# Patient Record
Sex: FEMALE | Race: WHITE | NOT HISPANIC OR LATINO | Employment: OTHER | URBAN - METROPOLITAN AREA
[De-identification: names, ages, dates, MRNs, and addresses within clinical notes are randomized per-mention and may not be internally consistent; named-entity substitution may affect disease eponyms.]

---

## 2017-03-02 ENCOUNTER — AMBULATORY - HEALTHEAST (OUTPATIENT)
Dept: CARDIAC REHAB | Facility: HOSPITAL | Age: 82
End: 2017-03-02

## 2017-03-02 DIAGNOSIS — I25.10 CAD (CORONARY ARTERY DISEASE): ICD-10-CM

## 2017-03-27 ENCOUNTER — OFFICE VISIT - HEALTHEAST (OUTPATIENT)
Dept: GERIATRICS | Facility: CLINIC | Age: 82
End: 2017-03-27

## 2017-03-27 DIAGNOSIS — I47.10 SUPRAVENTRICULAR TACHYCARDIA (H): ICD-10-CM

## 2017-03-27 DIAGNOSIS — N17.9 ACUTE KIDNEY INJURY (H): ICD-10-CM

## 2017-03-27 DIAGNOSIS — E87.1 HYPONATREMIA: ICD-10-CM

## 2017-03-27 DIAGNOSIS — I10 HYPERTENSION: ICD-10-CM

## 2017-03-27 DIAGNOSIS — R60.0 LOWER EXTREMITY EDEMA: ICD-10-CM

## 2017-03-27 DIAGNOSIS — R52 PAIN MANAGEMENT: ICD-10-CM

## 2017-03-27 DIAGNOSIS — S32.000A LUMBAR COMPRESSION FRACTURE (H): ICD-10-CM

## 2017-03-30 ENCOUNTER — COMMUNICATION - HEALTHEAST (OUTPATIENT)
Dept: CARDIOLOGY | Facility: CLINIC | Age: 82
End: 2017-03-30

## 2017-03-30 ENCOUNTER — OFFICE VISIT - HEALTHEAST (OUTPATIENT)
Dept: GERIATRICS | Facility: CLINIC | Age: 82
End: 2017-03-30

## 2017-03-30 DIAGNOSIS — R52 PAIN MANAGEMENT: ICD-10-CM

## 2017-03-30 DIAGNOSIS — N17.9 ACUTE KIDNEY INJURY (H): ICD-10-CM

## 2017-03-30 DIAGNOSIS — E87.1 HYPONATREMIA: ICD-10-CM

## 2017-03-30 DIAGNOSIS — R60.0 LOWER EXTREMITY EDEMA: ICD-10-CM

## 2017-03-30 DIAGNOSIS — S32.000A LUMBAR COMPRESSION FRACTURE (H): ICD-10-CM

## 2017-04-03 ENCOUNTER — OFFICE VISIT - HEALTHEAST (OUTPATIENT)
Dept: GERIATRICS | Facility: CLINIC | Age: 82
End: 2017-04-03

## 2017-04-03 DIAGNOSIS — E87.1 HYPONATREMIA: ICD-10-CM

## 2017-04-03 DIAGNOSIS — M54.50 LOW BACK PAIN: ICD-10-CM

## 2017-04-03 DIAGNOSIS — N17.9 ACUTE KIDNEY INJURY (H): ICD-10-CM

## 2017-04-03 DIAGNOSIS — R60.0 LOWER EXTREMITY EDEMA: ICD-10-CM

## 2017-04-03 DIAGNOSIS — S32.000A LUMBAR COMPRESSION FRACTURE (H): ICD-10-CM

## 2017-04-10 ENCOUNTER — OFFICE VISIT - HEALTHEAST (OUTPATIENT)
Dept: GERIATRICS | Facility: CLINIC | Age: 82
End: 2017-04-10

## 2017-04-10 DIAGNOSIS — S32.000A LUMBAR COMPRESSION FRACTURE (H): ICD-10-CM

## 2017-04-10 DIAGNOSIS — I10 HYPERTENSION: ICD-10-CM

## 2017-04-10 DIAGNOSIS — R60.0 LOWER EXTREMITY EDEMA: ICD-10-CM

## 2017-04-10 DIAGNOSIS — M54.50 LOW BACK PAIN: ICD-10-CM

## 2017-04-10 DIAGNOSIS — N17.9 ACUTE KIDNEY INJURY (H): ICD-10-CM

## 2017-04-13 ENCOUNTER — AMBULATORY - HEALTHEAST (OUTPATIENT)
Dept: GERIATRICS | Facility: CLINIC | Age: 82
End: 2017-04-13

## 2017-05-02 ENCOUNTER — AMBULATORY - HEALTHEAST (OUTPATIENT)
Dept: CARDIOLOGY | Facility: CLINIC | Age: 82
End: 2017-05-02

## 2017-05-05 ENCOUNTER — OFFICE VISIT - HEALTHEAST (OUTPATIENT)
Dept: CARDIOLOGY | Facility: CLINIC | Age: 82
End: 2017-05-05

## 2017-05-05 ENCOUNTER — AMBULATORY - HEALTHEAST (OUTPATIENT)
Dept: CARDIOLOGY | Facility: CLINIC | Age: 82
End: 2017-05-05

## 2017-05-05 DIAGNOSIS — I47.10 PAROXYSMAL SVT (SUPRAVENTRICULAR TACHYCARDIA) (H): ICD-10-CM

## 2017-05-05 DIAGNOSIS — E78.00 PURE HYPERCHOLESTEROLEMIA: ICD-10-CM

## 2017-05-05 DIAGNOSIS — I10 ESSENTIAL HYPERTENSION WITH GOAL BLOOD PRESSURE LESS THAN 140/90: ICD-10-CM

## 2017-05-05 DIAGNOSIS — E87.1 HYPONATREMIA: ICD-10-CM

## 2017-05-05 DIAGNOSIS — I25.10 CORONARY ARTERY DISEASE WITHOUT ANGINA PECTORIS: ICD-10-CM

## 2017-05-05 ASSESSMENT — MIFFLIN-ST. JEOR: SCORE: 954.73

## 2017-07-27 ENCOUNTER — RECORDS - HEALTHEAST (OUTPATIENT)
Dept: ADMINISTRATIVE | Facility: OTHER | Age: 82
End: 2017-07-27

## 2017-08-22 ENCOUNTER — RECORDS - HEALTHEAST (OUTPATIENT)
Dept: LAB | Facility: CLINIC | Age: 82
End: 2017-08-22

## 2017-08-22 LAB
CHOLEST SERPL-MCNC: 198 MG/DL
FASTING STATUS PATIENT QL REPORTED: NORMAL
HDLC SERPL-MCNC: 60 MG/DL
LDLC SERPL CALC-MCNC: 116 MG/DL
TRIGL SERPL-MCNC: 108 MG/DL

## 2018-02-22 ENCOUNTER — RECORDS - HEALTHEAST (OUTPATIENT)
Dept: LAB | Facility: CLINIC | Age: 83
End: 2018-02-22

## 2018-02-22 LAB
ALBUMIN SERPL-MCNC: 4.2 G/DL (ref 3.5–5)
ALP SERPL-CCNC: 106 U/L (ref 45–120)
ALT SERPL W P-5'-P-CCNC: 14 U/L (ref 0–45)
ANION GAP SERPL CALCULATED.3IONS-SCNC: 9 MMOL/L (ref 5–18)
AST SERPL W P-5'-P-CCNC: 18 U/L (ref 0–40)
BILIRUB SERPL-MCNC: 0.5 MG/DL (ref 0–1)
BUN SERPL-MCNC: 26 MG/DL (ref 8–28)
CALCIUM SERPL-MCNC: 10.1 MG/DL (ref 8.5–10.5)
CHLORIDE BLD-SCNC: 101 MMOL/L (ref 98–107)
CHOLEST SERPL-MCNC: 236 MG/DL
CO2 SERPL-SCNC: 26 MMOL/L (ref 22–31)
CREAT SERPL-MCNC: 1.28 MG/DL (ref 0.6–1.1)
FASTING STATUS PATIENT QL REPORTED: ABNORMAL
GFR SERPL CREATININE-BSD FRML MDRD: 39 ML/MIN/1.73M2
GLUCOSE BLD-MCNC: 103 MG/DL (ref 70–125)
HDLC SERPL-MCNC: 57 MG/DL
LDLC SERPL CALC-MCNC: 145 MG/DL
POTASSIUM BLD-SCNC: 4.3 MMOL/L (ref 3.5–5)
PROT SERPL-MCNC: 7.1 G/DL (ref 6–8)
SODIUM SERPL-SCNC: 136 MMOL/L (ref 136–145)
TRIGL SERPL-MCNC: 172 MG/DL

## 2018-04-12 ENCOUNTER — RECORDS - HEALTHEAST (OUTPATIENT)
Dept: LAB | Facility: CLINIC | Age: 83
End: 2018-04-12

## 2018-04-12 LAB
ALBUMIN UR-MCNC: NEGATIVE MG/DL
APPEARANCE UR: ABNORMAL
BACTERIA #/AREA URNS HPF: ABNORMAL HPF
BILIRUB UR QL STRIP: NEGATIVE
COLOR UR AUTO: ABNORMAL
GLUCOSE UR STRIP-MCNC: NEGATIVE MG/DL
HGB UR QL STRIP: NEGATIVE
HYALINE CASTS #/AREA URNS LPF: ABNORMAL LPF
KETONES UR STRIP-MCNC: NEGATIVE MG/DL
LEUKOCYTE ESTERASE UR QL STRIP: ABNORMAL
MUCOUS THREADS #/AREA URNS LPF: ABNORMAL LPF
NITRATE UR QL: NEGATIVE
PH UR STRIP: 5.5 [PH] (ref 4.5–8)
RBC #/AREA URNS AUTO: ABNORMAL HPF
SP GR UR STRIP: 1.01 (ref 1–1.03)
SQUAMOUS #/AREA URNS AUTO: ABNORMAL LPF
UROBILINOGEN UR STRIP-ACNC: ABNORMAL
WBC #/AREA URNS AUTO: ABNORMAL HPF
WBC CLUMPS #/AREA URNS HPF: PRESENT /[HPF]

## 2018-04-14 LAB — BACTERIA SPEC CULT: ABNORMAL

## 2018-05-16 ENCOUNTER — RECORDS - HEALTHEAST (OUTPATIENT)
Dept: LAB | Facility: CLINIC | Age: 83
End: 2018-05-16

## 2018-05-16 LAB
ALBUMIN UR-MCNC: NEGATIVE MG/DL
AMORPH CRY #/AREA URNS HPF: ABNORMAL /[HPF]
APPEARANCE UR: ABNORMAL
BACTERIA #/AREA URNS HPF: ABNORMAL HPF
BILIRUB UR QL STRIP: NEGATIVE
COLOR UR AUTO: YELLOW
GLUCOSE UR STRIP-MCNC: NEGATIVE MG/DL
HGB UR QL STRIP: NEGATIVE
KETONES UR STRIP-MCNC: NEGATIVE MG/DL
LEUKOCYTE ESTERASE UR QL STRIP: ABNORMAL
NITRATE UR QL: NEGATIVE
PH UR STRIP: 6.5 [PH] (ref 4.5–8)
RBC #/AREA URNS AUTO: ABNORMAL HPF
SP GR UR STRIP: 1.02 (ref 1–1.03)
SQUAMOUS #/AREA URNS AUTO: ABNORMAL LPF
UROBILINOGEN UR STRIP-ACNC: ABNORMAL
WBC #/AREA URNS AUTO: >100 HPF
WBC CLUMPS #/AREA URNS HPF: PRESENT /[HPF]

## 2018-05-18 LAB — BACTERIA SPEC CULT: ABNORMAL

## 2018-05-21 ENCOUNTER — RECORDS - HEALTHEAST (OUTPATIENT)
Dept: LAB | Facility: CLINIC | Age: 83
End: 2018-05-21

## 2018-05-21 LAB
ALBUMIN SERPL-MCNC: 3.8 G/DL (ref 3.5–5)
ALP SERPL-CCNC: 95 U/L (ref 45–120)
ALT SERPL W P-5'-P-CCNC: 11 U/L (ref 0–45)
ANION GAP SERPL CALCULATED.3IONS-SCNC: 8 MMOL/L (ref 5–18)
AST SERPL W P-5'-P-CCNC: 17 U/L (ref 0–40)
BASOPHILS # BLD AUTO: 0.1 THOU/UL (ref 0–0.2)
BASOPHILS NFR BLD AUTO: 1 % (ref 0–2)
BILIRUB SERPL-MCNC: 0.4 MG/DL (ref 0–1)
BUN SERPL-MCNC: 22 MG/DL (ref 8–28)
CALCIUM SERPL-MCNC: 10 MG/DL (ref 8.5–10.5)
CHLORIDE BLD-SCNC: 102 MMOL/L (ref 98–107)
CHOLEST SERPL-MCNC: 214 MG/DL
CO2 SERPL-SCNC: 26 MMOL/L (ref 22–31)
CREAT SERPL-MCNC: 1.28 MG/DL (ref 0.6–1.1)
EOSINOPHIL # BLD AUTO: 0.4 THOU/UL (ref 0–0.4)
EOSINOPHIL NFR BLD AUTO: 5 % (ref 0–6)
ERYTHROCYTE [DISTWIDTH] IN BLOOD BY AUTOMATED COUNT: 13 % (ref 11–14.5)
FASTING STATUS PATIENT QL REPORTED: ABNORMAL
GFR SERPL CREATININE-BSD FRML MDRD: 39 ML/MIN/1.73M2
GLUCOSE BLD-MCNC: 84 MG/DL (ref 70–125)
HCT VFR BLD AUTO: 36.1 % (ref 35–47)
HDLC SERPL-MCNC: 55 MG/DL
HGB BLD-MCNC: 11.7 G/DL (ref 12–16)
LDLC SERPL CALC-MCNC: 134 MG/DL
LYMPHOCYTES # BLD AUTO: 1.1 THOU/UL (ref 0.8–4.4)
LYMPHOCYTES NFR BLD AUTO: 15 % (ref 20–40)
MCH RBC QN AUTO: 30.3 PG (ref 27–34)
MCHC RBC AUTO-ENTMCNC: 32.4 G/DL (ref 32–36)
MCV RBC AUTO: 94 FL (ref 80–100)
MONOCYTES # BLD AUTO: 0.6 THOU/UL (ref 0–0.9)
MONOCYTES NFR BLD AUTO: 8 % (ref 2–10)
NEUTROPHILS # BLD AUTO: 5.2 THOU/UL (ref 2–7.7)
NEUTROPHILS NFR BLD AUTO: 71 % (ref 50–70)
PLATELET # BLD AUTO: 212 THOU/UL (ref 140–440)
PMV BLD AUTO: 10.6 FL (ref 8.5–12.5)
POTASSIUM BLD-SCNC: 4.6 MMOL/L (ref 3.5–5)
PROT SERPL-MCNC: 6.9 G/DL (ref 6–8)
RBC # BLD AUTO: 3.86 MILL/UL (ref 3.8–5.4)
SODIUM SERPL-SCNC: 136 MMOL/L (ref 136–145)
TRIGL SERPL-MCNC: 125 MG/DL
TSH SERPL DL<=0.005 MIU/L-ACNC: 0.49 UIU/ML (ref 0.3–5)
WBC: 7.4 THOU/UL (ref 4–11)

## 2018-05-22 LAB — 25(OH)D3 SERPL-MCNC: 54.7 NG/ML (ref 30–80)

## 2018-08-21 ENCOUNTER — RECORDS - HEALTHEAST (OUTPATIENT)
Dept: ADMINISTRATIVE | Facility: OTHER | Age: 83
End: 2018-08-21

## 2018-08-22 ENCOUNTER — HOSPITAL ENCOUNTER (OUTPATIENT)
Dept: ULTRASOUND IMAGING | Facility: HOSPITAL | Age: 83
Discharge: HOME | End: 2018-08-22

## 2018-08-22 DIAGNOSIS — R60.0 LEG EDEMA, LEFT: ICD-10-CM

## 2018-09-06 ENCOUNTER — RECORDS - HEALTHEAST (OUTPATIENT)
Dept: LAB | Facility: CLINIC | Age: 83
End: 2018-09-06

## 2018-09-06 LAB — TSH SERPL DL<=0.005 MIU/L-ACNC: 0.58 UIU/ML (ref 0.3–5)

## 2018-09-17 ENCOUNTER — RECORDS - HEALTHEAST (OUTPATIENT)
Dept: LAB | Facility: CLINIC | Age: 83
End: 2018-09-17

## 2018-09-17 LAB
ALBUMIN UR-MCNC: NEGATIVE MG/DL
APPEARANCE UR: ABNORMAL
BACTERIA #/AREA URNS HPF: ABNORMAL HPF
BILIRUB UR QL STRIP: NEGATIVE
COLOR UR AUTO: ABNORMAL
GLUCOSE UR STRIP-MCNC: NEGATIVE MG/DL
HGB UR QL STRIP: ABNORMAL
KETONES UR STRIP-MCNC: NEGATIVE MG/DL
LEUKOCYTE ESTERASE UR QL STRIP: ABNORMAL
NITRATE UR QL: NEGATIVE
PH UR STRIP: 5.5 [PH] (ref 4.5–8)
RBC #/AREA URNS AUTO: ABNORMAL HPF
SP GR UR STRIP: 1.02 (ref 1–1.03)
SQUAMOUS #/AREA URNS AUTO: ABNORMAL LPF
UROBILINOGEN UR STRIP-ACNC: ABNORMAL
WBC #/AREA URNS AUTO: >100 HPF
WBC CLUMPS #/AREA URNS HPF: PRESENT /[HPF]

## 2018-09-19 LAB — BACTERIA SPEC CULT: ABNORMAL

## 2018-10-03 ENCOUNTER — RECORDS - HEALTHEAST (OUTPATIENT)
Dept: LAB | Facility: CLINIC | Age: 83
End: 2018-10-03

## 2018-10-03 LAB
ALBUMIN UR-MCNC: NEGATIVE MG/DL
APPEARANCE UR: CLEAR
BACTERIA #/AREA URNS HPF: ABNORMAL HPF
BILIRUB UR QL STRIP: NEGATIVE
COLOR UR AUTO: YELLOW
GLUCOSE UR STRIP-MCNC: NEGATIVE MG/DL
HGB UR QL STRIP: NEGATIVE
KETONES UR STRIP-MCNC: NEGATIVE MG/DL
LEUKOCYTE ESTERASE UR QL STRIP: ABNORMAL
MUCOUS THREADS #/AREA URNS LPF: ABNORMAL LPF
NITRATE UR QL: NEGATIVE
PH UR STRIP: 5.5 [PH] (ref 4.5–8)
RBC #/AREA URNS AUTO: ABNORMAL HPF
SP GR UR STRIP: 1.01 (ref 1–1.03)
SQUAMOUS #/AREA URNS AUTO: ABNORMAL LPF
UROBILINOGEN UR STRIP-ACNC: ABNORMAL
WBC #/AREA URNS AUTO: ABNORMAL HPF

## 2018-10-04 LAB — BACTERIA SPEC CULT: NO GROWTH

## 2018-12-03 ENCOUNTER — RECORDS - HEALTHEAST (OUTPATIENT)
Dept: LAB | Facility: CLINIC | Age: 83
End: 2018-12-03

## 2018-12-03 LAB
25(OH)D3 SERPL-MCNC: 55.6 NG/ML (ref 30–80)
ANION GAP SERPL CALCULATED.3IONS-SCNC: 8 MMOL/L (ref 5–18)
BUN SERPL-MCNC: 19 MG/DL (ref 8–28)
CALCIUM SERPL-MCNC: 9.8 MG/DL (ref 8.5–10.5)
CHLORIDE BLD-SCNC: 102 MMOL/L (ref 98–107)
CHOLEST SERPL-MCNC: 131 MG/DL
CO2 SERPL-SCNC: 26 MMOL/L (ref 22–31)
CREAT SERPL-MCNC: 1.29 MG/DL (ref 0.6–1.1)
FASTING STATUS PATIENT QL REPORTED: NORMAL
GFR SERPL CREATININE-BSD FRML MDRD: 39 ML/MIN/1.73M2
GLUCOSE BLD-MCNC: 89 MG/DL (ref 70–125)
HDLC SERPL-MCNC: 56 MG/DL
LDLC SERPL CALC-MCNC: 55 MG/DL
POTASSIUM BLD-SCNC: 5 MMOL/L (ref 3.5–5)
SODIUM SERPL-SCNC: 136 MMOL/L (ref 136–145)
TRIGL SERPL-MCNC: 99 MG/DL
TSH SERPL DL<=0.005 MIU/L-ACNC: 0.93 UIU/ML (ref 0.3–5)

## 2019-05-09 ENCOUNTER — RECORDS - HEALTHEAST (OUTPATIENT)
Dept: LAB | Facility: CLINIC | Age: 84
End: 2019-05-09

## 2019-05-09 LAB
ALBUMIN SERPL-MCNC: 4.4 G/DL (ref 3.5–5)
ALP SERPL-CCNC: 94 U/L (ref 45–120)
ALT SERPL W P-5'-P-CCNC: 14 U/L (ref 0–45)
ANION GAP SERPL CALCULATED.3IONS-SCNC: 12 MMOL/L (ref 5–18)
AST SERPL W P-5'-P-CCNC: 19 U/L (ref 0–40)
BILIRUB SERPL-MCNC: 0.5 MG/DL (ref 0–1)
BUN SERPL-MCNC: 23 MG/DL (ref 8–28)
CALCIUM SERPL-MCNC: 10.3 MG/DL (ref 8.5–10.5)
CHLORIDE BLD-SCNC: 103 MMOL/L (ref 98–107)
CHOLEST SERPL-MCNC: 148 MG/DL
CO2 SERPL-SCNC: 24 MMOL/L (ref 22–31)
CREAT SERPL-MCNC: 1.3 MG/DL (ref 0.6–1.1)
FASTING STATUS PATIENT QL REPORTED: NORMAL
GFR SERPL CREATININE-BSD FRML MDRD: 38 ML/MIN/1.73M2
GLUCOSE BLD-MCNC: 94 MG/DL (ref 70–125)
HDLC SERPL-MCNC: 62 MG/DL
LDLC SERPL CALC-MCNC: 61 MG/DL
POTASSIUM BLD-SCNC: 4.6 MMOL/L (ref 3.5–5)
PROT SERPL-MCNC: 7.2 G/DL (ref 6–8)
SODIUM SERPL-SCNC: 139 MMOL/L (ref 136–145)
TRIGL SERPL-MCNC: 124 MG/DL
TSH SERPL DL<=0.005 MIU/L-ACNC: 0.44 UIU/ML (ref 0.3–5)

## 2019-05-10 LAB — 25(OH)D3 SERPL-MCNC: 55 NG/ML (ref 30–80)

## 2019-06-14 ENCOUNTER — RECORDS - HEALTHEAST (OUTPATIENT)
Dept: LAB | Facility: CLINIC | Age: 84
End: 2019-06-14

## 2019-06-14 LAB
ALBUMIN UR-MCNC: NEGATIVE MG/DL
APPEARANCE UR: ABNORMAL
BACTERIA #/AREA URNS HPF: ABNORMAL HPF
BILIRUB UR QL STRIP: NEGATIVE
COLOR UR AUTO: YELLOW
GLUCOSE UR STRIP-MCNC: NEGATIVE MG/DL
HGB UR QL STRIP: ABNORMAL
KETONES UR STRIP-MCNC: NEGATIVE MG/DL
LEUKOCYTE ESTERASE UR QL STRIP: ABNORMAL
MUCOUS THREADS #/AREA URNS LPF: ABNORMAL LPF
NITRATE UR QL: NEGATIVE
PH UR STRIP: 5.5 [PH] (ref 4.5–8)
RBC #/AREA URNS AUTO: ABNORMAL HPF
SP GR UR STRIP: 1.01 (ref 1–1.03)
SQUAMOUS #/AREA URNS AUTO: ABNORMAL LPF
UROBILINOGEN UR STRIP-ACNC: ABNORMAL
WBC #/AREA URNS AUTO: >100 HPF

## 2019-06-16 LAB — BACTERIA SPEC CULT: ABNORMAL

## 2019-07-22 ENCOUNTER — RECORDS - HEALTHEAST (OUTPATIENT)
Dept: LAB | Facility: CLINIC | Age: 84
End: 2019-07-22

## 2019-07-22 LAB
ALBUMIN UR-MCNC: NEGATIVE MG/DL
APPEARANCE UR: ABNORMAL
BILIRUB UR QL STRIP: NEGATIVE
COLOR UR AUTO: YELLOW
GLUCOSE UR STRIP-MCNC: NEGATIVE MG/DL
HGB UR QL STRIP: NEGATIVE
KETONES UR STRIP-MCNC: NEGATIVE MG/DL
LEUKOCYTE ESTERASE UR QL STRIP: ABNORMAL
NITRATE UR QL: NEGATIVE
PH UR STRIP: 5.5 [PH] (ref 4.5–8)
SP GR UR STRIP: 1.01 (ref 1–1.03)
UROBILINOGEN UR STRIP-ACNC: ABNORMAL

## 2019-07-23 ENCOUNTER — RECORDS - HEALTHEAST (OUTPATIENT)
Dept: LAB | Facility: CLINIC | Age: 84
End: 2019-07-23

## 2019-07-23 LAB
ANION GAP SERPL CALCULATED.3IONS-SCNC: 8 MMOL/L (ref 5–18)
BACTERIA SPEC CULT: NO GROWTH
BUN SERPL-MCNC: 22 MG/DL (ref 8–28)
CALCIUM SERPL-MCNC: 10 MG/DL (ref 8.5–10.5)
CHLORIDE BLD-SCNC: 101 MMOL/L (ref 98–107)
CO2 SERPL-SCNC: 25 MMOL/L (ref 22–31)
CREAT SERPL-MCNC: 1.23 MG/DL (ref 0.6–1.1)
ERYTHROCYTE [DISTWIDTH] IN BLOOD BY AUTOMATED COUNT: 13 % (ref 11–14.5)
GFR SERPL CREATININE-BSD FRML MDRD: 41 ML/MIN/1.73M2
GLUCOSE BLD-MCNC: 111 MG/DL (ref 70–125)
HCT VFR BLD AUTO: 36.2 % (ref 35–47)
HGB BLD-MCNC: 11.2 G/DL (ref 12–16)
MCH RBC QN AUTO: 29.8 PG (ref 27–34)
MCHC RBC AUTO-ENTMCNC: 30.9 G/DL (ref 32–36)
MCV RBC AUTO: 96 FL (ref 80–100)
PLATELET # BLD AUTO: 242 THOU/UL (ref 140–440)
PMV BLD AUTO: 11.2 FL (ref 8.5–12.5)
POTASSIUM BLD-SCNC: 3.6 MMOL/L (ref 3.5–5)
RBC # BLD AUTO: 3.76 MILL/UL (ref 3.8–5.4)
SODIUM SERPL-SCNC: 134 MMOL/L (ref 136–145)
TSH SERPL DL<=0.005 MIU/L-ACNC: 0.88 UIU/ML (ref 0.3–5)
WBC: 7.3 THOU/UL (ref 4–11)

## 2019-08-02 ENCOUNTER — RECORDS - HEALTHEAST (OUTPATIENT)
Dept: LAB | Facility: CLINIC | Age: 84
End: 2019-08-02

## 2019-08-02 LAB
ALBUMIN SERPL-MCNC: 4.2 G/DL (ref 3.5–5)
ALP SERPL-CCNC: 109 U/L (ref 45–120)
ALT SERPL W P-5'-P-CCNC: 13 U/L (ref 0–45)
ANION GAP SERPL CALCULATED.3IONS-SCNC: 12 MMOL/L (ref 5–18)
AST SERPL W P-5'-P-CCNC: 15 U/L (ref 0–40)
BILIRUB SERPL-MCNC: 0.3 MG/DL (ref 0–1)
BUN SERPL-MCNC: 23 MG/DL (ref 8–28)
CALCIUM SERPL-MCNC: 10.3 MG/DL (ref 8.5–10.5)
CHLORIDE BLD-SCNC: 100 MMOL/L (ref 98–107)
CO2 SERPL-SCNC: 24 MMOL/L (ref 22–31)
CREAT SERPL-MCNC: 1.35 MG/DL (ref 0.6–1.1)
FERRITIN SERPL-MCNC: 979 NG/ML (ref 10–130)
GFR SERPL CREATININE-BSD FRML MDRD: 37 ML/MIN/1.73M2
GLUCOSE BLD-MCNC: 100 MG/DL (ref 70–125)
IRON SATN MFR SERPL: 34 % (ref 20–50)
IRON SERPL-MCNC: 75 UG/DL (ref 42–175)
POTASSIUM BLD-SCNC: 4.7 MMOL/L (ref 3.5–5)
PROT SERPL-MCNC: 6.9 G/DL (ref 6–8)
SODIUM SERPL-SCNC: 136 MMOL/L (ref 136–145)
TIBC SERPL-MCNC: 219 UG/DL (ref 313–563)
TRANSFERRIN SERPL-MCNC: 175 MG/DL (ref 212–360)
VIT B12 SERPL-MCNC: 421 PG/ML (ref 213–816)

## 2020-01-03 ENCOUNTER — RECORDS - HEALTHEAST (OUTPATIENT)
Dept: LAB | Facility: CLINIC | Age: 85
End: 2020-01-03

## 2020-01-03 LAB
ANION GAP SERPL CALCULATED.3IONS-SCNC: 7 MMOL/L (ref 5–18)
BUN SERPL-MCNC: 17 MG/DL (ref 8–28)
CALCIUM SERPL-MCNC: 9.6 MG/DL (ref 8.5–10.5)
CHLORIDE BLD-SCNC: 98 MMOL/L (ref 98–107)
CO2 SERPL-SCNC: 26 MMOL/L (ref 22–31)
CREAT SERPL-MCNC: 1.17 MG/DL (ref 0.6–1.1)
GFR SERPL CREATININE-BSD FRML MDRD: 43 ML/MIN/1.73M2
GLUCOSE BLD-MCNC: 142 MG/DL (ref 70–125)
POTASSIUM BLD-SCNC: 4.3 MMOL/L (ref 3.5–5)
SODIUM SERPL-SCNC: 131 MMOL/L (ref 136–145)

## 2020-01-07 ENCOUNTER — RECORDS - HEALTHEAST (OUTPATIENT)
Dept: LAB | Facility: CLINIC | Age: 85
End: 2020-01-07

## 2020-01-07 LAB
ALBUMIN SERPL-MCNC: 3.9 G/DL (ref 3.5–5)
ALP SERPL-CCNC: 97 U/L (ref 45–120)
ALT SERPL W P-5'-P-CCNC: 11 U/L (ref 0–45)
ANION GAP SERPL CALCULATED.3IONS-SCNC: 9 MMOL/L (ref 5–18)
AST SERPL W P-5'-P-CCNC: 14 U/L (ref 0–40)
BILIRUB SERPL-MCNC: 0.5 MG/DL (ref 0–1)
BUN SERPL-MCNC: 25 MG/DL (ref 8–28)
CALCIUM SERPL-MCNC: 9.4 MG/DL (ref 8.5–10.5)
CHLORIDE BLD-SCNC: 98 MMOL/L (ref 98–107)
CO2 SERPL-SCNC: 24 MMOL/L (ref 22–31)
CREAT SERPL-MCNC: 1.51 MG/DL (ref 0.6–1.1)
GFR SERPL CREATININE-BSD FRML MDRD: 32 ML/MIN/1.73M2
GLUCOSE BLD-MCNC: 99 MG/DL (ref 70–125)
POTASSIUM BLD-SCNC: 4.6 MMOL/L (ref 3.5–5)
PROT SERPL-MCNC: 6.5 G/DL (ref 6–8)
SODIUM SERPL-SCNC: 131 MMOL/L (ref 136–145)

## 2020-01-21 ENCOUNTER — RECORDS - HEALTHEAST (OUTPATIENT)
Dept: LAB | Facility: CLINIC | Age: 85
End: 2020-01-21

## 2020-01-21 LAB
ALBUMIN SERPL-MCNC: 3.6 G/DL (ref 3.5–5)
ALP SERPL-CCNC: 110 U/L (ref 45–120)
ALT SERPL W P-5'-P-CCNC: 10 U/L (ref 0–45)
ANION GAP SERPL CALCULATED.3IONS-SCNC: 8 MMOL/L (ref 5–18)
AST SERPL W P-5'-P-CCNC: 15 U/L (ref 0–40)
BILIRUB SERPL-MCNC: 0.4 MG/DL (ref 0–1)
BUN SERPL-MCNC: 25 MG/DL (ref 8–28)
CALCIUM SERPL-MCNC: 9.3 MG/DL (ref 8.5–10.5)
CHLORIDE BLD-SCNC: 103 MMOL/L (ref 98–107)
CO2 SERPL-SCNC: 25 MMOL/L (ref 22–31)
CREAT SERPL-MCNC: 1.26 MG/DL (ref 0.6–1.1)
GFR SERPL CREATININE-BSD FRML MDRD: 40 ML/MIN/1.73M2
GLUCOSE BLD-MCNC: 114 MG/DL (ref 70–125)
POTASSIUM BLD-SCNC: 4.1 MMOL/L (ref 3.5–5)
PROT SERPL-MCNC: 6.5 G/DL (ref 6–8)
SODIUM SERPL-SCNC: 136 MMOL/L (ref 136–145)

## 2020-11-10 ENCOUNTER — RECORDS - HEALTHEAST (OUTPATIENT)
Dept: LAB | Facility: CLINIC | Age: 85
End: 2020-11-10

## 2020-11-10 LAB
ALBUMIN SERPL-MCNC: 4.1 G/DL (ref 3.5–5)
ALP SERPL-CCNC: 84 U/L (ref 45–120)
ALT SERPL W P-5'-P-CCNC: <9 U/L (ref 0–45)
ANION GAP SERPL CALCULATED.3IONS-SCNC: 10 MMOL/L (ref 5–18)
AST SERPL W P-5'-P-CCNC: 12 U/L (ref 0–40)
BILIRUB SERPL-MCNC: 0.4 MG/DL (ref 0–1)
BUN SERPL-MCNC: 26 MG/DL (ref 8–28)
CALCIUM SERPL-MCNC: 9.8 MG/DL (ref 8.5–10.5)
CHLORIDE BLD-SCNC: 105 MMOL/L (ref 98–107)
CHOLEST SERPL-MCNC: 130 MG/DL
CO2 SERPL-SCNC: 25 MMOL/L (ref 22–31)
CREAT SERPL-MCNC: 1.44 MG/DL (ref 0.6–1.1)
FASTING STATUS PATIENT QL REPORTED: NORMAL
GFR SERPL CREATININE-BSD FRML MDRD: 34 ML/MIN/1.73M2
GLUCOSE BLD-MCNC: 109 MG/DL (ref 70–125)
HDLC SERPL-MCNC: 58 MG/DL
LDLC SERPL CALC-MCNC: 45 MG/DL
POTASSIUM BLD-SCNC: 4.7 MMOL/L (ref 3.5–5)
PROT SERPL-MCNC: 6.7 G/DL (ref 6–8)
SODIUM SERPL-SCNC: 140 MMOL/L (ref 136–145)
TRIGL SERPL-MCNC: 135 MG/DL
TSH SERPL DL<=0.005 MIU/L-ACNC: 0.26 UIU/ML (ref 0.3–5)

## 2021-03-12 ENCOUNTER — RECORDS - HEALTHEAST (OUTPATIENT)
Dept: LAB | Facility: CLINIC | Age: 86
End: 2021-03-12

## 2021-03-15 LAB
ALBUMIN SERPL-MCNC: 3.9 G/DL (ref 3.5–5)
ALP SERPL-CCNC: 89 U/L (ref 45–120)
ALT SERPL W P-5'-P-CCNC: 10 U/L (ref 0–45)
ANION GAP SERPL CALCULATED.3IONS-SCNC: 11 MMOL/L (ref 5–18)
AST SERPL W P-5'-P-CCNC: 14 U/L (ref 0–40)
BASOPHILS # BLD AUTO: 0.1 THOU/UL (ref 0–0.2)
BASOPHILS NFR BLD AUTO: 1 % (ref 0–2)
BILIRUB SERPL-MCNC: 0.3 MG/DL (ref 0–1)
BUN SERPL-MCNC: 28 MG/DL (ref 8–28)
CALCIUM SERPL-MCNC: 9.7 MG/DL (ref 8.5–10.5)
CHLORIDE BLD-SCNC: 102 MMOL/L (ref 98–107)
CO2 SERPL-SCNC: 24 MMOL/L (ref 22–31)
CREAT SERPL-MCNC: 1.4 MG/DL (ref 0.6–1.1)
EOSINOPHIL # BLD AUTO: 0.4 THOU/UL (ref 0–0.4)
EOSINOPHIL NFR BLD AUTO: 4 % (ref 0–6)
ERYTHROCYTE [DISTWIDTH] IN BLOOD BY AUTOMATED COUNT: 13.3 % (ref 11–14.5)
GFR SERPL CREATININE-BSD FRML MDRD: 35 ML/MIN/1.73M2
GLUCOSE BLD-MCNC: 96 MG/DL (ref 70–125)
HCT VFR BLD AUTO: 33.5 % (ref 35–47)
HGB BLD-MCNC: 10.8 G/DL (ref 12–16)
IMM GRANULOCYTES # BLD: 0.1 THOU/UL
IMM GRANULOCYTES NFR BLD: 1 %
LYMPHOCYTES # BLD AUTO: 1.3 THOU/UL (ref 0.8–4.4)
LYMPHOCYTES NFR BLD AUTO: 15 % (ref 20–40)
MCH RBC QN AUTO: 29.8 PG (ref 27–34)
MCHC RBC AUTO-ENTMCNC: 32.2 G/DL (ref 32–36)
MCV RBC AUTO: 92 FL (ref 80–100)
MONOCYTES # BLD AUTO: 0.9 THOU/UL (ref 0–0.9)
MONOCYTES NFR BLD AUTO: 10 % (ref 2–10)
NEUTROPHILS # BLD AUTO: 6.3 THOU/UL (ref 2–7.7)
NEUTROPHILS NFR BLD AUTO: 70 % (ref 50–70)
PLATELET # BLD AUTO: 271 THOU/UL (ref 140–440)
PMV BLD AUTO: 10.9 FL (ref 8.5–12.5)
POTASSIUM BLD-SCNC: 4.4 MMOL/L (ref 3.5–5)
PROT SERPL-MCNC: 7.1 G/DL (ref 6–8)
RBC # BLD AUTO: 3.63 MILL/UL (ref 3.8–5.4)
SODIUM SERPL-SCNC: 137 MMOL/L (ref 136–145)
TSH SERPL DL<=0.005 MIU/L-ACNC: 1.13 UIU/ML (ref 0.3–5)
WBC: 9 THOU/UL (ref 4–11)

## 2021-05-25 ENCOUNTER — RECORDS - HEALTHEAST (OUTPATIENT)
Dept: ADMINISTRATIVE | Facility: CLINIC | Age: 86
End: 2021-05-25

## 2021-05-29 ENCOUNTER — RECORDS - HEALTHEAST (OUTPATIENT)
Dept: ADMINISTRATIVE | Facility: CLINIC | Age: 86
End: 2021-05-29

## 2021-05-30 VITALS — HEIGHT: 65 IN | WEIGHT: 121 LBS | BODY MASS INDEX: 20.16 KG/M2

## 2021-05-31 ENCOUNTER — RECORDS - HEALTHEAST (OUTPATIENT)
Dept: ADMINISTRATIVE | Facility: CLINIC | Age: 86
End: 2021-05-31

## 2021-06-02 ENCOUNTER — RECORDS - HEALTHEAST (OUTPATIENT)
Dept: ADMINISTRATIVE | Facility: CLINIC | Age: 86
End: 2021-06-02

## 2021-06-03 ENCOUNTER — RECORDS - HEALTHEAST (OUTPATIENT)
Dept: ADMINISTRATIVE | Facility: CLINIC | Age: 86
End: 2021-06-03

## 2021-06-09 NOTE — PROGRESS NOTES
[facility]    Facility:   [facility] [[id number]]   Code Status: UNKNOWN      CHIEF COMPLAINT/REASON FOR VISIT:  Chief Complaint   Patient presents with     Review Of Multiple Medical Conditions     Acute kidney injury, hyponatremia, supraventricular tachycardia, low back pain with lumbar compression fracture, chronic bilateral lower extremity edema.       HISTORY:      HPI: Johanna is a 88 y.o. female who resides here at the [facility] care after hospitalization for compression fracture and low back pain which is improving with a TLSO splint.  She did have supraventricular tachycardia which has resolved and she did have hyponatremia which she is on salt tablets 3 times daily.  Her last sodium was within normal limits and she is doing well at this time.  She continues to wear her TLSO splint and she claims her pain is manageable.  She does deny any other problems at this time and her lower extremity edema is gone.    Past Medical History:   Diagnosis Date     Anemia      Anxiety      Back pain      CAD (coronary artery disease)      GERD (gastroesophageal reflux disease)      Hiatal hernia      HLD (hyperlipidemia)      HTN (hypertension)      MI (myocardial infarction) 1983     Restless leg      Thyroid disease      Vertebral compression fracture              Family History   Problem Relation Age of Onset     Breast cancer       Heart disease       Diabetes       Social History     Social History     Marital status:      Spouse name: N/A     Number of children: N/A     Years of education: N/A     Social History Main Topics     Smoking status: Never Smoker     Smokeless tobacco: Never Used     Alcohol use No     Drug use: No     Sexual activity: Not Asked     Other Topics Concern     None     Social History Narrative         Review of Systems   Constitutional: Negative for activity change, chills and fever.   HENT: Negative for hearing loss.     Eyes: Negative for visual disturbance.   Respiratory: Negative for apnea, chest tightness and shortness of breath.    Cardiovascular: Negative for chest pain and palpitations.   Gastrointestinal: Negative for abdominal pain, constipation, nausea and vomiting.   Endocrine: Negative for polydipsia, polyphagia and polyuria.   Genitourinary: Negative for decreased urine volume and urgency.   Musculoskeletal: Negative for neck pain and neck stiffness.   Skin: Negative for rash.   Hematological: Does not bruise/bleed easily.   Psychiatric/Behavioral: Negative for agitation and behavioral problems.       .  Vitals:    04/10/17 0721   BP: 158/79   Pulse: 70   Resp: 20   Temp: 97.2  F (36.2  C)   SpO2: 96%       Physical Exam   Constitutional: She is oriented to person, place, and time. She appears well-developed and well-nourished. No distress.   HENT:   Head: Normocephalic and atraumatic.   Nose: Nose normal.   Mouth/Throat: Oropharynx is clear and moist. No oropharyngeal exudate.   Eyes: Conjunctivae and EOM are normal. Pupils are equal, round, and reactive to light. Right eye exhibits no discharge. Left eye exhibits no discharge. No scleral icterus.   Neck: Neck supple. No tracheal deviation present. No thyromegaly present.   Cardiovascular: Normal rate, regular rhythm and normal heart sounds.  Exam reveals no gallop and no friction rub.    No murmur heard.  Pulmonary/Chest: Effort normal. No respiratory distress. She has no wheezes. She has no rales.   Abdominal: Soft. Bowel sounds are normal. She exhibits no distension and no mass. There is no tenderness. There is no rebound and no guarding.   Musculoskeletal: Normal range of motion. She exhibits no edema or tenderness.   Lymphadenopathy:     She has no cervical adenopathy.   Neurological: She is alert and oriented to person, place, and time. She displays normal reflexes. No cranial nerve deficit. She exhibits normal muscle tone. Coordination normal.   Skin: Skin is  warm and dry. No rash noted. She is not diaphoretic. No erythema. No pallor.   Psychiatric: She has a normal mood and affect. Her behavior is normal.         LABS: Reviewed latest labs from last week was sodium potassium and calcium were within normal limits and creatinine was 1.11 which is down from 1.17.  BUN was normal at 14 and GFR was 46.      ASSESSMENT:      ICD-10-CM    1. Acute kidney injury N17.9    2. Lower extremity edema R60.0    3. Lumbar compression fracture S32.000A    4. Low back pain M54.5    5. Hypertension I10        PLAN: Plan at this time is to decrease her salt tabs to twice daily and check a sodium on Wednesday.  I will discuss with staff today about possible discharge planning and no other changes to care plan at this time.  Patient's pain is well controlled and her blood pressure is normotensive for her age.  Her lower extremity edema has resolved.      Total  minutes of which % was spent counseling and coordination of care of the above plan.    Electronically signed by: Greg Davenport DO

## 2021-06-09 NOTE — PROGRESS NOTES
[facility]    Facility:   [facility] [[id number]]   Code Status: UNKNOWN      CHIEF COMPLAINT/REASON FOR VISIT:  Chief Complaint   Patient presents with     Review Of Multiple Medical Conditions     Acute kidney injury, hyponatremia, supraventricular tachycardia, low back pain with lumbar compression fracture, chronic bilateral lower extremity edema.       HISTORY:      HPI: Johanna is a 88 y.o. female who resides here at the [facility] undergoing physical and Occupational Therapy.  She was recently hospitalized on 3/21/2017 had an MRI at an outside facility showed 25% compression fracture of the lumbar spine.  She did have it TLSO splint in place.  And she did have some supraventricular tachycardia however this seems to have resolved.  She did convert to sinus rhythm with adenosine in the emergency room.  She is currently residing here at the [facility] undergoing physical and Occupational Therapy.  Her sodium level was low and now it is up to 134 however she is currently on salt tablets 3 times daily and I will check a sodium this morning.  Her pain is under good control and the only thing she complains about today is a cough.  She is eager to go home at this time and I will discuss with physical therapy the issues of going home.  She denies any other problems today.    Past Medical History:   Diagnosis Date     Anemia      Anxiety      Back pain      CAD (coronary artery disease)      GERD (gastroesophageal reflux disease)      Hiatal hernia      HLD (hyperlipidemia)      HTN (hypertension)      MI (myocardial infarction) 1983     Restless leg      Thyroid disease      Vertebral compression fracture              Family History   Problem Relation Age of Onset     Breast cancer       Heart disease       Diabetes       Social History     Social History     Marital status:      Spouse name: N/A     Number of children: N/A     Years of  education: N/A     Social History Main Topics     Smoking status: Never Smoker     Smokeless tobacco: Never Used     Alcohol use No     Drug use: No     Sexual activity: Not Asked     Other Topics Concern     None     Social History Narrative         Review of Systems   Constitutional:        Patient complains today that her pain is in good control she denies any fevers chills nausea vomiting diarrhea change in vision hearing taste or smell weakness on one side or the other chest pain or shortness of breath.  She does claim however she does have a cough which is productive and a dry throat.  Her lower extremity edema is gotten so much better at this time and I do not appreciate any edema at this time.  The remainder the review of systems is negative.       .  Vitals:    04/03/17 0634   BP: 130/68   Pulse: 60   Resp: 18   Temp: (!) 96  F (35.6  C)   SpO2: 95%       Physical Exam   Constitutional: She appears well-developed and well-nourished. No distress.   HENT:   Head: Normocephalic and atraumatic.   Nose: Nose normal.   Mouth/Throat: Oropharynx is clear and moist. No oropharyngeal exudate.   Eyes: Conjunctivae and EOM are normal. Pupils are equal, round, and reactive to light. Right eye exhibits no discharge. Left eye exhibits no discharge. No scleral icterus.   Neck: Neck supple. No tracheal deviation present. No thyromegaly present.   Cardiovascular: Normal rate, regular rhythm and normal heart sounds.  Exam reveals no gallop and no friction rub.    No murmur heard.  Pulmonary/Chest: Effort normal. She has wheezes. She has no rales. She exhibits no tenderness.   Patient has good inspiratory volume however she does experience expiratory wheezes and rhonchi and late expiratory phase.  There were no crackles or rales heard.   Abdominal: Soft. Bowel sounds are normal. She exhibits no distension and no mass. There is no tenderness. There is no rebound and no guarding.   Musculoskeletal: She exhibits no edema.    Patient's edema seems to have resolved for now.   Lymphadenopathy:     She has no cervical adenopathy.   Neurological: She is alert. No cranial nerve deficit. She exhibits normal muscle tone.   Skin: Skin is warm and dry. No rash noted. She is not diaphoretic. No erythema. No pallor.   Psychiatric: She has a normal mood and affect. Her behavior is normal.         LABS: On February 29, 2017 sodium was 134 which was up from 133.  Potassium was 3.5 creatinine was 1.17 GFR was 44.  On 27 February magnesium was 1.7.      ASSESSMENT:      ICD-10-CM    1. Acute kidney injury N17.9    2. Lower extremity edema R60.0    3. Hyponatremia E87.1    4. Low back pain M54.5    5. Lumbar compression fracture S32.000A        PLAN: Plan at this time is to check a basic metabolic profile stat today secondary to hyponatremia as well as her acute kidney injury.  Given the fact the patient has a cough which is somewhat productive a chest x-ray today PA and lateral will be done.  Patient's pain is under good control at this time and she claims to me that this is tolerable.  Her lower extremity edema seems to be resolving because there is no appreciable edema at this time.  I will continue to monitor above medical problems and no other changes to care plan at this time.      Total  minutes of which % was spent counseling and coordination of care of the above plan.    Electronically signed by: Greg Davenport DO

## 2021-06-09 NOTE — PROGRESS NOTES
[facility]    Facility:   [facility] [[id number]]   Code Status: UNKNOWN      CHIEF COMPLAINT/REASON FOR VISIT:  Chief Complaint   Patient presents with     Review Of Multiple Medical Conditions     Acute kidney injury, hyponatremia, supraventricular tachycardia, low back pain with lumbar compression fracture, hypothyroidism, chronic bilateral lower extremity edema.       HISTORY:      HPI: Johanna is a 88 y.o. female who resides here at the [facility] undergoing physical and occupational therapy secondary to multiple medical problems which I will list below.  She was recently hospitalized on 3/21/2017 and she did have an MRI from an outside facility that showed 25% compression fracture of her lumbar spine.  She does have a TLSO splint in place and she was found to be in supraventricular tachycardia in the emergency room.  And this was on 3/21/2017 she did have a stress test monitoring which was unremarkable on 3/8/2016.  She did convert to sinus rhythm with adenosine in the emergency room and she does have some dementia with a slums score of 9 out of 30.  Her sodium was low at 130 and it is now increased to 134.  They did increase her salt tablets to 3 times daily on discharge and she was transferred to the TCU in stable condition.    Patient claims her pain is in good control however she does have some pain with cough and does have a throat crackling sensation at night when she wakes up.  She feels her mouth is very dry and she does have a history of GERD.  She asked me about the salt tablets and I will keep them for now secondary to her sodium is low.  She otherwise is doing okay and has no new issues.    Past Medical History:   Diagnosis Date     Anemia      Anxiety      Back pain      CAD (coronary artery disease)      GERD (gastroesophageal reflux disease)      Hiatal hernia      HLD (hyperlipidemia)      HTN (hypertension)      MI (myocardial  infarction) 1983     Restless leg      Thyroid disease      Vertebral compression fracture              Family History   Problem Relation Age of Onset     Breast cancer       Heart disease       Diabetes       Social History     Social History     Marital status:      Spouse name: N/A     Number of children: N/A     Years of education: N/A     Social History Main Topics     Smoking status: Never Smoker     Smokeless tobacco: Never Used     Alcohol use No     Drug use: No     Sexual activity: Not Asked     Other Topics Concern     None     Social History Narrative         Review of Systems   Constitutional:        Patient's review of systems is positive for dry throat with painful cough anterior chest.  She has no fevers chills nausea vomiting or diarrhea and otherwise denies any shortness of breath.  She denies any incontinence to urine or stool polyphagia or polydipsia polyuria depression or anxiety.  The remainder the review of systems is negative.       .  Vitals:    03/30/17 0828   BP: 151/72   Pulse: 68   Resp: 18   Temp: 97.1  F (36.2  C)   SpO2: 97%       Physical Exam   Constitutional: She is oriented to person, place, and time. She appears well-developed and well-nourished. No distress.   HENT:   Head: Normocephalic and atraumatic.   Nose: Nose normal.   Mouth/Throat: No oropharyngeal exudate.   Oral mucosa appears dry.   Eyes: Conjunctivae and EOM are normal. Pupils are equal, round, and reactive to light. Right eye exhibits no discharge. Left eye exhibits no discharge. No scleral icterus.   Neck: Neck supple. No tracheal deviation present. No thyromegaly present.   Cardiovascular: Normal rate, regular rhythm and normal heart sounds.  Exam reveals no gallop and no friction rub.    No murmur heard.  Pulmonary/Chest: Effort normal. No respiratory distress. She has no wheezes. She has no rales.   Musculoskeletal: She exhibits no edema or tenderness.   Patient's TLSO splint is on.   Lymphadenopathy:      She has no cervical adenopathy.   Neurological: She is alert and oriented to person, place, and time. She displays normal reflexes. No cranial nerve deficit. She exhibits normal muscle tone. Coordination normal.   Skin: Skin is warm and dry. No rash noted. She is not diaphoretic. No erythema. No pallor.   Psychiatric: She has a normal mood and affect. Her behavior is normal.         LABS: Creatinine is 1.17 which is increased from 0.9.  Sodium is improving from 133-134 and GFR is down to 44 from 59.  BUN is within normal limits in potassium and calcium were within normal limits and these results were from 3/28/2017.      ASSESSMENT:      ICD-10-CM    1. Lumbar compression fracture S32.000A    2. Hyponatremia E87.1    3. Pain management R52    4. Acute kidney injury N17.9    5. Lower extremity edema R60.0        PLAN: Plan at this time is to get a basic metabolic profile tomorrow secondary to acute kidney injury.  We I will have speech evaluate and treat secondary to cough and dry throat.  And I will get a chest x-ray PA and lateral today secondary to her cough and chest pain with cough.  I will put her on a 1200 cc free water restriction which is written this is not a fluid restriction and I will push fluids however not free water.  I will continue to monitor above medical problems and no other changes to care plan at this time.      Total  minutes of which % was spent counseling and coordination of care of the above plan.    Electronically signed by: Greg Davenport DO

## 2021-06-09 NOTE — PROGRESS NOTES
[facility]      Facility:    [facility] [[id number]]  Code Status: UNKNOWN      Chief Complaint/Reason for Visit:  Chief Complaint   Patient presents with     H & P     Hyponatremia, acute kidney injury, supraventricular tachycardia of unknown cause, acute kidney injury with metabolic acidosis, low back pain with lumbar compression fracture, hypothyroidism, leukocytosis, hypertension, chronic bilateral lower extremity edema.       HPI:   Johanna is a 88 y.o. female who was recently admitted to the hospital on 3/21/2017 secondary to ongoing back pain.  She did have an MRI outside the facility that showed a 25% compression fracture in her lumbar spine.  Patient was seen by physical therapy in a TLSO splint was placed.  She was found to be in supraventricular tachycardia in the emergency room however she did have stress test monitoring which was unremarkable on 3/8/2016.  She does have GERD and hypertension which remained relatively stable and she converted to sinus rhythm with adenosine in the emergency room.  She does have some dementia with a slums score of 9 out of 30 and her sodium was in the 130s.  They did increase her salt tablets to 3 times daily on discharge and she was treated appropriately and transferred here to the TCU in stable condition.  She is on Tylenol suppositories at this time however she does prefer to take it orally.  Her pain is well managed although she still has some pain and she is walking with her walker independently.  She is being evaluated today by physical and Occupational Therapy.  She was treated appropriately and transferred here to the TCU at [facility] in stable condition.    Past Medical History:  Past Medical History:   Diagnosis Date     Anemia      Anxiety      Back pain      CAD (coronary artery disease)      GERD (gastroesophageal reflux disease)      Hiatal hernia      HLD (hyperlipidemia)      HTN (hypertension)      MI  (myocardial infarction) 1983     Restless leg      Thyroid disease      Vertebral compression fracture            Surgical History:  Past Surgical History:   Procedure Laterality Date     APPENDECTOMY       CATARACT EXTRACTION       HYSTERECTOMY       MASTECTOMY Bilateral      THYROIDECTOMY         Family History:   Family History   Problem Relation Age of Onset     Breast cancer       Heart disease       Diabetes         Social History:    Social History     Social History     Marital status:      Spouse name: N/A     Number of children: N/A     Years of education: N/A     Social History Main Topics     Smoking status: Never Smoker     Smokeless tobacco: Never Used     Alcohol use No     Drug use: No     Sexual activity: Not Asked     Other Topics Concern     None     Social History Narrative          Review of Systems   Constitutional: Negative for activity change, chills and fever.   HENT: Negative for hearing loss.    Eyes: Negative for visual disturbance.   Respiratory: Negative for apnea, chest tightness and shortness of breath.    Cardiovascular: Negative for chest pain and palpitations.   Gastrointestinal: Negative for abdominal pain, constipation, nausea and vomiting.   Endocrine: Negative for polydipsia, polyphagia and polyuria.   Genitourinary: Negative for decreased urine volume and urgency.   Musculoskeletal: Negative for neck pain and neck stiffness.   Skin: Negative for rash.   Hematological: Does not bruise/bleed easily.   Psychiatric/Behavioral: Negative for agitation and behavioral problems.       Vitals:    03/27/17 0931   BP: 132/65   Pulse: 60   Resp: 24   Temp: (!) 96.5  F (35.8  C)   SpO2: 97%       Physical Exam   Constitutional: She is oriented to person, place, and time. She appears well-developed and well-nourished. No distress.   HENT:   Head: Normocephalic and atraumatic.   Nose: Nose normal.   Mouth/Throat: Oropharynx is clear and moist. No oropharyngeal exudate.   Eyes:  Conjunctivae are normal. Right eye exhibits no discharge. Left eye exhibits no discharge. No scleral icterus.   Neck: Neck supple. No tracheal deviation present. No thyromegaly present.   Cardiovascular: Normal rate, regular rhythm and normal heart sounds.  Exam reveals no gallop and no friction rub.    No murmur heard.  Pulmonary/Chest: Effort normal. No respiratory distress. She has no wheezes. She has no rales.   Abdominal: Soft. Bowel sounds are normal. She exhibits no distension.   Musculoskeletal: Normal range of motion.   Patient is in a TLSO splint however she does have trace edema bilateral.   Lymphadenopathy:     She has no cervical adenopathy.   Neurological: She is alert and oriented to person, place, and time. She displays normal reflexes. No cranial nerve deficit. She exhibits normal muscle tone. Coordination normal.   Skin: Skin is warm and dry. No rash noted. She is not diaphoretic. No erythema. No pallor.   Psychiatric: She has a normal mood and affect. Her behavior is normal.       Medication List:  Current Outpatient Prescriptions   Medication Sig     acetaminophen (TYLENOL) 650 MG suppository Insert 1 suppository (650 mg total) into the rectum every 6 (six) hours as needed.     aspirin 81 mg chewable tablet Chew 81 mg daily.     calcium, as carbonate, (TUMS) 200 mg calcium (500 mg) chewable tablet Chew 1 tablet 3 (three) times a day.      cyclobenzaprine (FLEXERIL) 10 MG tablet Take 10 mg by mouth 2 (two) times a day as needed for muscle spasms.     diclofenac epolamine (FLECTOR) 1.3 % PT12 Place 1 patch on the skin 2 (two) times a day as needed.     ergocalciferol (VITAMIN D2) 50,000 unit capsule Take 50,000 Units by mouth once a week.     escitalopram oxalate (LEXAPRO) 10 MG tablet Take 10 mg by mouth daily.     ferrous sulfate 65 mg elemental iron Take 2 tablets by mouth daily with breakfast.      levothyroxine (SYNTHROID, LEVOTHROID) 75 MCG tablet Take 75 mcg by mouth daily.     losartan  (COZAAR) 25 MG tablet Take 25 mg by mouth daily.     metoprolol tartrate (LOPRESSOR) 25 MG tablet Take 1 tablet (25 mg total) by mouth 2 (two) times a day.     MULTIVITAMIN W/C ORAL Take 2 tablets by mouth daily.      NIFEdipine (PROCARDIA XL) 60 MG 24 hr tablet Take 1 tablet (60 mg total) by mouth daily.     nitroglycerin (NITROSTAT) 0.4 MG SL tablet Place 0.4 mg under the tongue every 5 (five) minutes as needed for chest pain.     omeprazole (PRILOSEC) 20 MG capsule Take 20 mg by mouth 2 (two) times a day before meals.      pramipexole (MIRAPEX) 0.25 MG tablet Take 0.25-0.75 mg by mouth bedtime.     rosuvastatin (CRESTOR) 10 MG tablet Take 10 mg by mouth bedtime.     senna-docusate (PERICOLACE) 8.6-50 mg tablet Take 1 tablet by mouth 2 (two) times a day. Do not administer if loose stools.     sodium chloride 1 gram tablet Take 1 tablet (1 g total) by mouth 3 (three) times a day.     torsemide (DEMADEX) 5 MG tablet Take 5 mg by mouth 2 (two) times a week.      triamcinolone (KENALOG) 0.1 % cream Apply 1 application topically 3 (three) times a day as needed.      trimethoprim (TRIMPEX) 100 mg tablet Take 100 mg by mouth daily.     peg 400-propylene glycol (SYSTANE) 0.4-0.3 % Drop Administer 1 drop to both eyes 4 (four) times a day.       Labs: Hospital labs are as follows; albumin was 3.0, phosphorus was 2.7, creatinine was 0.82, sodium was 129 and 131, potassium was 3.7, GFR was greater than 60, white blood count was 10.2, hemoglobin was 11.5, magnesium was 1.5.      Assessment:    ICD-10-CM    1. Lumbar compression fracture S32.000A    2. Pain management R52    3. Hyponatremia E87.1    4. Acute kidney injury N17.9    5. Supraventricular tachycardia I47.1    6. Hypertension I10    7. Lower extremity edema R60.0        Plan: Plan at this time is to check a basic metabolic profile tomorrow secondary to low sodium as well as acute kidney injury.  We will check a magnesium tomorrow secondary to low magnesium and we  will check weights daily.  I will DC her Tylenol suppository and start Tylenol 650 mg 1 every 6 hours scheduled and all meds to be given with food at this time.  I will continue to monitor above medical problems and will discuss with physical and Occupational Therapy today about possible discharge planning for the end of the week which is the desire of the patient at this time.        Electronically signed by: Greg Davenport DO

## 2021-06-10 NOTE — PROGRESS NOTES
[facility] Follow-up Note    Assessment & Plan        1. 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  She is on metoprolol now at 25 mg twice a day, but given bradycardia and increased fatigue would lower the dose to 12.5.     2. Essential hypertension with goal blood pressure less than 140/90 -blood pressure under good control.     3. Coronary artery disease without angina pectoris -patient carries this diagnosis based on MI in 1983 without any intervention done and apparently a silent MI in 2000 and a change in her ECG. Despite this diagnosis ECG shows no significant Q waves, and nuclear stress test in 2012 showed no ischemia or obvious infarct. I strongly doubt that the patient has had significant MIs in the past. In any event would work on prevention.  No symptoms currently, and stress test in March 2016 showed no ischemia or scar although there was transient ischemic dilatation of 1.55.     4. Pure hypercholesterolemia -cholesterol excellent 161 with an LDL of 79.  Given patient's age and desiring not to take much medications would probably discontinue statin.     5. Hyponatremia -mildly low urine osmolality with mildly low serum osmolality.  I am not an endocrinologist but appears to be possibly SIADH.  Do not believe salt tablets followed by Demadex as well as potassium would be best in an 89 soon-to-be 89-year-old.  I defer to primary however.     6.  Mild aortic stenosis-this is based on echo that was just done this year and would recheck in a year.    Plan  1.  Speak with primary concerning sodium treatment.  2.  Would discontinue statin given age.  3.  Decrease metoprolol to 12.5 mg by mouth twice a day.  4.  Come to medical attention should be significant chest discomfort.  5.  Follow-up with me in 6 months given all these issues or sooner if needed.    Subjective  CC: 89-year-old white female being seen by me in post hospital discharge  follow-up and concurrently a year follow-up since I saw her for the first time.  She had significant back discomfort and had some lumbar compression fractures and developed an SVT.  She also was noted to have significant hyponatremia.  She comes in today now in assisted living due to memory care issues and denies any chest discomfort, palpitations, shortness of breath, PND, orthopnea or peripheral edema.  Her biggest concern is profound fatigue.    Medications  Current Outpatient Prescriptions   Medication Sig     aspirin 81 mg chewable tablet Chew 81 mg daily.     calcium, as carbonate, (TUMS) 200 mg calcium (500 mg) chewable tablet Chew 1 tablet 3 (three) times a day.      cyclobenzaprine (FLEXERIL) 10 MG tablet Take 10 mg by mouth 2 (two) times a day as needed for muscle spasms.     diclofenac epolamine (FLECTOR) 1.3 % PT12 Place 1 patch on the skin 2 (two) times a day as needed.     ergocalciferol (VITAMIN D2) 50,000 unit capsule Take 50,000 Units by mouth once a week.     escitalopram oxalate (LEXAPRO) 10 MG tablet Take 10 mg by mouth daily.     ferrous sulfate 65 mg elemental iron Take 2 tablets by mouth daily with breakfast.      levothyroxine (SYNTHROID, LEVOTHROID) 75 MCG tablet Take 75 mcg by mouth daily.     losartan (COZAAR) 25 MG tablet Take 25 mg by mouth daily.     metoprolol tartrate (LOPRESSOR) 25 MG tablet Take 1 tablet (25 mg total) by mouth 2 (two) times a day.     MULTIVITAMIN W/C ORAL Take 2 tablets by mouth daily.      NIFEdipine (PROCARDIA XL) 60 MG 24 hr tablet Take 1 tablet (60 mg total) by mouth daily.     nitroglycerin (NITROSTAT) 0.4 MG SL tablet Place 0.4 mg under the tongue every 5 (five) minutes as needed for chest pain.     omeprazole (PRILOSEC) 20 MG capsule Take 20 mg by mouth 2 (two) times a day before meals.      peg 400-propylene glycol (SYSTANE) 0.4-0.3 % Drop Administer 1 drop to both eyes 4 (four) times a day.     POTASSIUM CHLORIDE ORAL Take by mouth.     pramipexole  "(MIRAPEX) 0.25 MG tablet Take 0.25-0.75 mg by mouth bedtime.     rosuvastatin (CRESTOR) 10 MG tablet Take 10 mg by mouth bedtime.     senna-docusate (PERICOLACE) 8.6-50 mg tablet Take 1 tablet by mouth 2 (two) times a day. Do not administer if loose stools.     sodium chloride 1 gram tablet Take 1 tablet (1 g total) by mouth 3 (three) times a day.     torsemide (DEMADEX) 5 MG tablet Take 5 mg by mouth 2 (two) times a week.      triamcinolone (KENALOG) 0.1 % cream Apply 1 application topically 3 (three) times a day as needed.      trimethoprim (TRIMPEX) 100 mg tablet Take 100 mg by mouth daily.       Objective  /54 (Patient Site: Left Arm, Patient Position: Sitting, Cuff Size: Adult Regular)  Pulse (!) 55  Resp 16  Ht 5' 5\" (1.651 m)  Wt 121 lb (54.9 kg)  BMI 20.14 kg/m2    General Appearance:    Alert, cooperative, no distress, appears stated age   Head:    Normocephalic, without obvious abnormality, atraumatic   Throat:   Lips, mucosa, and tongue normal; teeth and gums normal   Neck:   Supple, symmetrical, trachea midline, no adenopathy;        thyroid:  No enlargement/tenderness/nodules; no carotid    bruit or JVD   Back:     Symmetric, no curvature, ROM normal, no CVA tenderness   Lungs:     Clear to auscultation bilaterally, respirations unlabored   Chest wall:    No tenderness or deformity   Heart:    Regular rate and rhythm, S1 and S2 normal, 1-2/6 systolic crescendo decrescendo murmur , no rub   or gallop   Abdomen:     Soft, non-tender, bowel sounds active all four quadrants,     no masses, no organomegaly   Extremities:   Normal, atraumatic, no cyanosis or edema   Pulses:   2+ and symmetric all extremities   Skin:   Skin color, texture, turgor normal, no rashes or lesions     Results    Lab Results personally reviewed   Lab Results   Component Value Date    CHOL 161 11/14/2016    CHOL 145 05/02/2016     Lab Results   Component Value Date    HDL 67 11/14/2016    HDL 63 05/02/2016     Lab Results "   Component Value Date    LDLCALC 79 11/14/2016    LDLCALC 63 05/02/2016     Lab Results   Component Value Date    TRIG 76 11/14/2016    TRIG 97 05/02/2016     Lab Results   Component Value Date    WBC 12.0 (H) 03/27/2017    HGB 12.7 03/27/2017    HCT 38.9 03/27/2017     03/27/2017     Lab Results   Component Value Date    CREATININE 1.09 04/24/2017    BUN 16 04/24/2017     04/24/2017    K 4.6 04/24/2017    CO2 23 04/24/2017       Review of Systems:   General: WNL  Eyes: WNL  Ears/Nose/Throat: WNL  Lungs: WNL  Heart: WNL  Stomach: WNL  Bladder: WNL  Muscle/Joints: WNL  Skin: WNL  Nervous System: WNL  Mental Health: WNL     Blood: WNL

## 2021-06-12 NOTE — PROGRESS NOTES
Discharge Note    Diagnosis: CAD    Functional Outcomes:  Patient has participated in Phase III Cardiac Rehab from 5/10/16 to 3/2/17.  Patient has progressed/maintained a 2-3 met level for a duration of 20-40 minutes.  Target heart Rate 20-30bpm>RHR.  RPE 11-13.  Resting HR Range 67-88  BP Range 116//62  Exercise HR Range 72-93  BP Range 122//76     Asymptomatic/ Symptomatic: Asymptomatic    Patient verbalized an understanding of:  Home Exercise program    Patient has chosen to discontinue Phase III Cardiac Rehabilitation.  Pt last session here was 3/2/17. We have not heard from patient since, we will be discharging the patient from our care (8/11/17).

## 2021-06-15 PROBLEM — E87.1 HYPONATREMIA: Status: ACTIVE | Noted: 2017-03-21

## 2021-06-16 PROBLEM — R07.9 ACUTE CHEST PAIN: Status: ACTIVE | Noted: 2018-09-02

## 2021-06-27 ENCOUNTER — HEALTH MAINTENANCE LETTER (OUTPATIENT)
Age: 86
End: 2021-06-27

## 2021-07-08 ENCOUNTER — HOSPITAL ENCOUNTER (EMERGENCY)
Dept: EMERGENCY MEDICINE | Facility: HOSPITAL | Age: 86
Discharge: HOME | End: 2021-07-08
Attending: EMERGENCY MEDICINE
Payer: MEDICARE

## 2021-07-08 DIAGNOSIS — M79.605 PAIN OF LEFT LOWER EXTREMITY: ICD-10-CM

## 2021-07-08 ASSESSMENT — MIFFLIN-ST. JEOR: SCORE: 963.8

## 2021-07-08 NOTE — ED TRIAGE NOTES
ED Triage Notes by Jane Macias RN at 7/8/2021  3:22 PM     Author: Jane Macias RN Service: -- Author Type: Registered Nurse    Filed: 7/8/2021  3:27 PM Date of Service: 7/8/2021  3:22 PM Status: Signed    : Jane aMcias RN (Registered Nurse)       Pt brought in by [facility] from assisted living. Pt states that she developed left calf pain yesterday, that became worse today. Pt states that she only has pain with movement, none at rest. Pt denies any injury or fall. Left leg is slightly swollen. No warmth or redness present. Pt has no history of blood clots.

## 2021-07-09 NOTE — ED PROVIDER NOTES
"ED Provider Notes by Lisa Chao MD at 7/8/2021  4:10 PM     Author: Lisa Chao MD Service: -- Author Type: Physician    Filed: 7/9/2021  5:50 AM Date of Service: 7/8/2021  4:10 PM Status: Signed    : Lisa Chao MD (Physician)       EMERGENCY DEPARTMENT ENCOUNTER      NAME: Johanna Washburn  AGE: 89 y.o. female  YOB: 1928  MRN: [id number]  EVALUATION DATE & TIME: 7/8/2021  3:43 PM    PCP: Reed Shore MD    ED PROVIDER: Lisa Chao M.D.    Chief Complaint   Patient presents with   ? Leg Pain         FINAL IMPRESSION:  1. Pain of left lower extremity        MEDICAL DECISION MAKING:    Pertinent Labs & Imaging studies reviewed. (See chart for details)    89 y.o. female presents to the Emergency Department for evaluation of left calf pain.  Patient reports 2-day history of swelling and discomfort in the posterior aspect of her proximal left calf.  She mentioned it to the \"had nurse\" at her living facility earlier today and was advised to be seen in the emergency department.  No associated fever, chills, dyspnea, rash, warm, or trauma.  Patient does not have a history of blood clots and has had no recent travel.  Vitals on arrival stable and reassuring.  Remainder of history and physical exam, as below.    Consider broad differential including but not limited to DVT, Baker's cyst, varicosity, muscular strain/sprain.  Although I do feel that DVT fairly unlikely, out of an abundance of caution and because she was sent here from ultrasound, we have agreed on plan to pursue this now.  Patient declined offer for analgesic.  I see medication for laboratory work-up and patient agrees.    US venous doppler showed no evidence of DVT or Baker's cyst. Unclear etiology of symptoms but I have low suspicion for any acute cardiopulmonary, vascular, infections, or traumatic process requiring further workup/management on an emergent basis. I rechecked the patient and she " "felt reassured and was eager to go home. She notes that she came here at the recommendation of the \"head nurse\" at her facility and felt comfortable with plan for discharge and follow up in clinic or with nurse at residence for a recheck. She has not required anything for pain and remains hemodynamically stable.     The importance of close follow up was discussed. We reviewed warning signs and symptoms, and I instructed Ms. Washburn to return to the emergency department immediately if she develops any new or worsening symptoms. I provided additional verbal discharge instructions. Ms. Washburn expressed understanding and agreement with this plan of care, her questions were answered, and she was discharged in stable condition.      ED COURSE:  4:01 PM I met with the patient, obtained history, performed an initial exam, and discussed options and plan for diagnostics and treatment here in the ED. Proper PPE was worn during the entire patient   6:37 PM I rechecked and updated the patient.    MEDICATIONS GIVEN IN THE ED:  Medications - No data to display    NEW PRESCRIPTIONS STARTED AT TODAY'S VISIT:  Discharge Medication List as of 7/8/2021  7:35 PM      CONTINUE these medications which have NOT CHANGED    Details   acetaminophen (TYLENOL) 500 MG tablet Take 500 mg by mouth every 6 (six) hours as needed for pain., Until Discontinued, Historical Med      aspirin 81 mg chewable tablet Chew 81 mg daily., Until Discontinued, Historical Med      azithromycin (ZITHROMAX) 250 MG tablet TAKE 2 TABS (500MG) BY MOUTH ON DAY ONE AND 1 TAB DAILY ON DAYS 2-5., Historical Med      benzonatate (TESSALON) 100 MG capsule TAKE 1 CAP BY MOUTH THREE TIMES DAILY AS NEEDED FOR COUGH, Historical Med      calcium, as carbonate, (TUMS) 200 mg calcium (500 mg) chewable tablet Chew 1 tablet 3 (three) times a day. , Until Discontinued, Historical Med      ciprofloxacin HCl (CIPRO) 250 MG tablet TAKE 1 TAB BY MOUTH TWICE A DAY FOR 5 DAYS., " Historical Med      diclofenac epolamine (FLECTOR) 1.3 % PT12 Place 1 patch on the skin 2 (two) times a day as needed., Until Discontinued, Historical Med      diclofenac sodium (VOLTAREN) 1 % Gel APPLY 2G TO AFFECTED AREA(S) FOUR TIMES DAILY, Historical Med      ergocalciferol (VITAMIN D2) 50,000 unit capsule Take 50,000 Units by mouth once a week., Until Discontinued, Historical Med      escitalopram oxalate (LEXAPRO) 10 MG tablet Take 10 mg by mouth daily., Until Discontinued, Historical Med      ferrous sulfate 65 mg elemental iron Take 2 tablets by mouth daily with breakfast. , Until Discontinued, Historical Med      guaiFENesin (ROBITUSSIN) 100 mg/5 mL syrup Take 200 mg by mouth 3 (three) times a day as needed for cough., Until Discontinued, Historical Med      levothyroxine (SYNTHROID, LEVOTHROID) 75 MCG tablet Take 75 mcg by mouth daily., Until Discontinued, Historical Med      losartan (COZAAR) 25 MG tablet Take 50 mg by mouth daily. , Until Discontinued, Historical Med      NIFEdipine (PROCARDIA XL) 90 MG 24 hr tablet Take 90 mg by mouth daily., Until Discontinued, Historical Med      nitroglycerin (NITROSTAT) 0.4 MG SL tablet Place 0.4 mg under the tongue every 5 (five) minutes as needed for chest pain., Until Discontinued, Historical Med      omeprazole (PRILOSEC) 20 MG capsule Take 20 mg by mouth 2 (two) times a day before meals. , Until Discontinued, Historical Med      peg 400-propylene glycol (SYSTANE) 0.4-0.3 % Drop Administer 1 drop to both eyes 4 (four) times a day., Until Discontinued, Historical Med      pramipexole (MIRAPEX) 0.25 MG tablet Take 0.25-0.75 mg by mouth at bedtime as needed. , Until Discontinued, Historical Med      REFRESH TEARS 0.5 % Drop Administer 1 drop to both eyes 2 (two) times a day., Starting Wed 3/27/2019, Historical Med      rosuvastatin (CRESTOR) 10 MG tablet Take 10 mg by mouth at bedtime., Starting Tue 12/17/2019, Historical Med      senna-docusate (PERICOLACE) 8.6-50  "mg tablet Take 1 tablet by mouth 2 (two) times a day. Do not administer if loose stools., Starting 3/25/2017, Until Discontinued, OTC      torsemide (DEMADEX) 5 MG tablet Take 5 mg by mouth daily. , Until Discontinued, Historical Med      triamcinolone (KENALOG) 0.1 % cream Apply 1 application topically 3 (three) times a day as needed. , Until Discontinued, Historical Med      trimethoprim (TRIMPEX) 100 mg tablet Take 100 mg by mouth daily., Until Discontinued, Historical Med               =================================================================    HPI    Patient information was obtained from: Patient    Use of : N/A     Johanna Washburn is a 89 y.o. female with a pertinent history of hypertension, hyperlipemia, CAD,  and myocardial infarction who presents to the ED via EMS for evaluation of left calf pain.     Patient presents with left calf pain and associated swelling since yesterday. The pain is located proximal to left knee on the posterior and medial sides. Additionally, she describes that her \"legs are jumpy\" at night. She denies prolonged travel, history of blood clot, or known family history of blood clots.    Patient denies shortness of breath, trauma, chest pain, rashes, cough, congestion, fever, abdominal pain, nausea, vomiting, diarrhea, or any additional symptoms.    REVIEW OF SYSTEMS   Review of Systems   Constitutional: Negative for fever.   HENT: Negative for congestion.    Respiratory: Negative for cough and shortness of breath.    Cardiovascular: Positive for leg swelling (left calf). Negative for chest pain.   Gastrointestinal: Negative for abdominal pain, diarrhea, nausea and vomiting.   Musculoskeletal:        Positive for left calf pain.   Skin: Negative for rash.   All other systems reviewed and are negative.       RELEVANT HISTORY, MEDICATIONS, & ALLERGIES   Past medical, surgical, social, and family history; medications; and allergies reviewed in Epic records. " "Pertinent information noted in HPI. See end of note for comprehensive list.    VITALS:  Patient Vitals for the past 24 hrs:   BP Temp Temp src Pulse Resp SpO2 Height Weight   07/08/21 1954 167/73 98  F (36.7  C) Oral 79 20 94 % -- --   07/08/21 1900 175/78 -- -- 81 -- 90 % -- --   07/08/21 1845 175/79 -- -- 80 -- 95 % -- --   07/08/21 1830 170/77 -- -- 81 -- 95 % -- --   07/08/21 1815 150/68 -- -- 78 -- 92 % -- --   07/08/21 1700 -- -- -- 74 -- 97 % -- --   07/08/21 1630 152/66 -- -- 67 -- 93 % -- --   07/08/21 1615 143/68 -- -- 65 -- 92 % -- --   07/08/21 1600 144/64 -- -- 64 -- 95 % -- --   07/08/21 1519 164/68 98.3  F (36.8  C) Temporal 67 16 92 % 5' 5\" (1.651 m) 123 lb (55.8 kg)       PHYSICAL EXAM    Vitals: /73 (Patient Position: Sitting)   Pulse 79   Temp 98  F (36.7  C) (Oral)   Resp 20   Ht 5' 5\" (1.651 m)   Wt 123 lb (55.8 kg)   SpO2 94%   BMI 20.47 kg/m     General:Alert and interactive, comfortable appearing.  HENT: Oropharynx without erythema or exudates. MMM.   Eyes: Pupils mid-sized and equally reactive.   Neck: Full AROM.   Cardiovascular: Regular rate and rhythm. Peripheral pulses 2+ bilaterally.  Chest/Pulmonary: Normal work of breathing. Lung sounds clear and equal throughout, no wheezes or crackles. No chest wall tenderness or deformities.  Abdomen: Soft, nondistended. Nontender without guarding or rebound.  Extremities: Left lower leg mildly tender to palpation over calf. No palpable mass, chord, or overlying erythema or rash. Palpable dp and pt pulses. Normal ROM of all major joints. No lower extremity edema.   Skin: Warm and dry. Normal skin color.   Neuro: Speech clear. CNs grossly intact. Moves all extremities appropriately. Strength and sensation grossly intact to all extremities.   Psych: Normal affect/mood, cooperative, memory appropriate.   RADIOLOGY:  Reviewed all pertinent imaging. Please see official radiology report.  Us Venous Leg Left    Result Date: 7/8/2021  EXAM: US " VENOUS LEG LEFT LOCATION: [facility] DATE/TIME: 7/8/2021 5:33 PM INDICATION: swelling, pain COMPARISON: 08/22/2018 TECHNIQUE: Venous Duplex ultrasound of the left lower extremity with and without compression, augmentation and duplex. Color flow and spectral Doppler with waveform analysis performed. FINDINGS: Exam includes the common femoral, femoral, popliteal, and contralateral common femoral veins as well as segmentally visualized deep calf veins and greater saphenous vein. LEFT: No deep vein thrombosis. No superficial thrombophlebitis. No popliteal cyst. Subcutaneous edema about the ankle again noted.     1.  No deep venous thrombosis in the left lower extremity.      Comprehensive Outline of History per [facility]    PAST MEDICAL HISTORY:  Past Medical History:   Diagnosis Date   ? Anemia    ? Anxiety    ? Back pain    ? CAD (coronary artery disease)    ? GERD (gastroesophageal reflux disease)    ? Hiatal hernia    ? HLD (hyperlipidemia)    ? HTN (hypertension)    ? MI (myocardial infarction) 1983   ? Restless leg    ? Thyroid disease    ? Vertebral compression fracture (H)        PAST SURGICAL HISTORY:  Past Surgical History:   Procedure Laterality Date   ? APPENDECTOMY     ? CATARACT EXTRACTION     ? HYSTERECTOMY     ? MASTECTOMY Bilateral    ? THYROIDECTOMY         CURRENT MEDICATIONS:    No current facility-administered medications on file prior to encounter.      Current Outpatient Medications on File Prior to Encounter   Medication Sig   ? acetaminophen (TYLENOL) 500 MG tablet Take 500 mg by mouth every 6 (six) hours as needed for pain.   ? aspirin 81 mg chewable tablet Chew 81 mg daily.   ? azithromycin (ZITHROMAX) 250 MG tablet TAKE 2 TABS (500MG) BY MOUTH ON DAY ONE AND 1 TAB DAILY ON DAYS 2-5.   ? benzonatate (TESSALON) 100 MG capsule TAKE 1 CAP BY MOUTH THREE TIMES DAILY AS NEEDED FOR COUGH   ? calcium, as carbonate, (TUMS) 200 mg calcium (500 mg) chewable tablet Chew 1 tablet 3  (three) times a day.    ? ciprofloxacin HCl (CIPRO) 250 MG tablet TAKE 1 TAB BY MOUTH TWICE A DAY FOR 5 DAYS.   ? diclofenac epolamine (FLECTOR) 1.3 % PT12 Place 1 patch on the skin 2 (two) times a day as needed.   ? diclofenac sodium (VOLTAREN) 1 % Gel APPLY 2G TO AFFECTED AREA(S) FOUR TIMES DAILY   ? ergocalciferol (VITAMIN D2) 50,000 unit capsule Take 50,000 Units by mouth once a week.   ? escitalopram oxalate (LEXAPRO) 10 MG tablet Take 10 mg by mouth daily.   ? ferrous sulfate 65 mg elemental iron Take 2 tablets by mouth daily with breakfast.    ? guaiFENesin (ROBITUSSIN) 100 mg/5 mL syrup Take 200 mg by mouth 3 (three) times a day as needed for cough.   ? levothyroxine (SYNTHROID, LEVOTHROID) 75 MCG tablet Take 75 mcg by mouth daily.   ? losartan (COZAAR) 25 MG tablet Take 50 mg by mouth daily.    ? NIFEdipine (PROCARDIA XL) 90 MG 24 hr tablet Take 90 mg by mouth daily.   ? nitroglycerin (NITROSTAT) 0.4 MG SL tablet Place 0.4 mg under the tongue every 5 (five) minutes as needed for chest pain.   ? omeprazole (PRILOSEC) 20 MG capsule Take 20 mg by mouth 2 (two) times a day before meals.    ? peg 400-propylene glycol (SYSTANE) 0.4-0.3 % Drop Administer 1 drop to both eyes 4 (four) times a day.   ? pramipexole (MIRAPEX) 0.25 MG tablet Take 0.25-0.75 mg by mouth at bedtime as needed.    ? REFRESH TEARS 0.5 % Drop Administer 1 drop to both eyes 2 (two) times a day.   ? rosuvastatin (CRESTOR) 10 MG tablet Take 10 mg by mouth at bedtime.   ? senna-docusate (PERICOLACE) 8.6-50 mg tablet Take 1 tablet by mouth 2 (two) times a day. Do not administer if loose stools.   ? torsemide (DEMADEX) 5 MG tablet Take 5 mg by mouth daily.    ? triamcinolone (KENALOG) 0.1 % cream Apply 1 application topically 3 (three) times a day as needed.    ? trimethoprim (TRIMPEX) 100 mg tablet Take 100 mg by mouth daily.       ALLERGIES:  Allergies   Allergen Reactions   ? Sulfa (Sulfonamide Antibiotics) Rash   ? Vioxx [Rofecoxib] Swelling   ?  Augmentin [Amoxicillin-Pot Clavulanate] Nausea Only   ? Demerol [Meperidine] Nausea Only   ? Minocin [Minocycline] Other (See Comments)     lightheaded       FAMILY HISTORY:  Family History   Problem Relation Age of Onset   ? Breast cancer Unknown    ? Heart disease Unknown    ? Diabetes Unknown        SOCIAL HISTORY:   Social History     Socioeconomic History   ? Marital status:      Spouse name: Not on file   ? Number of children: Not on file   ? Years of education: Not on file   ? Highest education level: Not on file   Occupational History   ? Not on file   Social Needs   ? Financial resource strain: Not on file   ? Food insecurity     Worry: Not on file     Inability: Not on file   ? Transportation needs     Medical: Not on file     Non-medical: Not on file   Tobacco Use   ? Smoking status: Never Smoker   ? Smokeless tobacco: Never Used   Substance and Sexual Activity   ? Alcohol use: No   ? Drug use: No   ? Sexual activity: Not on file   Lifestyle   ? Physical activity     Days per week: Not on file     Minutes per session: Not on file   ? Stress: Not on file   Relationships   ? Social connections     Talks on phone: Not on file     Gets together: Not on file     Attends Bahai service: Not on file     Active member of club or organization: Not on file     Attends meetings of clubs or organizations: Not on file     Relationship status: Not on file   ? Intimate partner violence     Fear of current or ex partner: Not on file     Emotionally abused: Not on file     Physically abused: Not on file     Forced sexual activity: Not on file   Other Topics Concern   ? Not on file   Social History Narrative   ? Not on file       I, Louie Higgins, am serving as a scribe to document services personally performed by Dr. Lisa Chao based on my observation and the provider's statements to me. I, Lisa Chao MD attest that Louie Higgins is acting in a scribe capacity, has observed my performance of the services and has  documented them in accordance with my direction.    Lisa Chao M.D.  Emergency Medicine  [facility] EMERGENCY DEPARTMENT  [address]  [facility] [id number]  Dept: [phone]  Loc: [phone]       Lisa Chao MD  07/09/21 0550

## 2021-07-10 VITALS — HEIGHT: 65 IN | WEIGHT: 123 LBS | BODY MASS INDEX: 20.49 KG/M2

## 2021-07-31 ENCOUNTER — APPOINTMENT (OUTPATIENT)
Dept: RADIOLOGY | Facility: HOSPITAL | Age: 86
End: 2021-07-31
Attending: EMERGENCY MEDICINE
Payer: MEDICARE

## 2021-07-31 ENCOUNTER — HOSPITAL ENCOUNTER (EMERGENCY)
Facility: HOSPITAL | Age: 86
Discharge: HOME | End: 2021-08-01
Attending: EMERGENCY MEDICINE | Admitting: EMERGENCY MEDICINE
Payer: MEDICARE

## 2021-07-31 DIAGNOSIS — R07.9 CHEST PAIN, UNSPECIFIED TYPE: ICD-10-CM

## 2021-07-31 LAB
ALBUMIN SERPL-MCNC: 3.6 G/DL (ref 3.5–5)
ALP SERPL-CCNC: 89 U/L (ref 45–120)
ALT SERPL W P-5'-P-CCNC: 10 U/L (ref 0–45)
ANION GAP SERPL CALCULATED.3IONS-SCNC: 9 MMOL/L (ref 5–18)
AST SERPL W P-5'-P-CCNC: 14 U/L (ref 0–40)
BASOPHILS # BLD AUTO: 0.1 10E3/UL (ref 0–0.2)
BASOPHILS NFR BLD AUTO: 1 %
BILIRUB SERPL-MCNC: 0.4 MG/DL (ref 0–1)
BNP SERPL-MCNC: 154 PG/ML (ref 0–167)
BUN SERPL-MCNC: 23 MG/DL (ref 8–28)
CALCIUM SERPL-MCNC: 9.6 MG/DL (ref 8.5–10.5)
CHLORIDE BLD-SCNC: 103 MMOL/L (ref 98–107)
CO2 SERPL-SCNC: 22 MMOL/L (ref 22–31)
CREAT SERPL-MCNC: 1.23 MG/DL (ref 0.6–1.1)
EOSINOPHIL # BLD AUTO: 0.3 10E3/UL (ref 0–0.7)
EOSINOPHIL NFR BLD AUTO: 4 %
ERYTHROCYTE [DISTWIDTH] IN BLOOD BY AUTOMATED COUNT: 13.2 % (ref 10–15)
GFR SERPL CREATININE-BSD FRML MDRD: 38 ML/MIN/1.73M2
GLUCOSE BLD-MCNC: 94 MG/DL (ref 70–125)
HCT VFR BLD AUTO: 30.9 % (ref 35–47)
HGB BLD-MCNC: 10 G/DL (ref 11.7–15.7)
HOLD SPECIMEN: NORMAL
IMM GRANULOCYTES # BLD: 0.1 10E3/UL
IMM GRANULOCYTES NFR BLD: 1 %
LIPASE SERPL-CCNC: 54 U/L (ref 0–52)
LYMPHOCYTES # BLD AUTO: 1.1 10E3/UL (ref 0.8–5.3)
LYMPHOCYTES NFR BLD AUTO: 13 %
MCH RBC QN AUTO: 28.8 PG (ref 26.5–33)
MCHC RBC AUTO-ENTMCNC: 32.4 G/DL (ref 31.5–36.5)
MCV RBC AUTO: 89 FL (ref 78–100)
MONOCYTES # BLD AUTO: 1 10E3/UL (ref 0–1.3)
MONOCYTES NFR BLD AUTO: 12 %
NEUTROPHILS # BLD AUTO: 5.9 10E3/UL (ref 1.6–8.3)
NEUTROPHILS NFR BLD AUTO: 69 %
NRBC # BLD AUTO: 0 10E3/UL
NRBC BLD AUTO-RTO: 0 /100
PLATELET # BLD AUTO: 239 10E3/UL (ref 150–450)
POTASSIUM BLD-SCNC: 3.9 MMOL/L (ref 3.5–5)
PROT SERPL-MCNC: 6.7 G/DL (ref 6–8)
RBC # BLD AUTO: 3.47 10E6/UL (ref 3.8–5.2)
SODIUM SERPL-SCNC: 134 MMOL/L (ref 136–145)
TROPONIN I SERPL-MCNC: <0.01 NG/ML (ref 0–0.29)
WBC # BLD AUTO: 8.4 10E3/UL (ref 4–11)

## 2021-07-31 PROCEDURE — 93005 ELECTROCARDIOGRAM TRACING: CPT | Performed by: STUDENT IN AN ORGANIZED HEALTH CARE EDUCATION/TRAINING PROGRAM

## 2021-07-31 PROCEDURE — 83690 ASSAY OF LIPASE: CPT | Performed by: EMERGENCY MEDICINE

## 2021-07-31 PROCEDURE — 85004 AUTOMATED DIFF WBC COUNT: CPT | Performed by: EMERGENCY MEDICINE

## 2021-07-31 PROCEDURE — 80053 COMPREHEN METABOLIC PANEL: CPT | Performed by: EMERGENCY MEDICINE

## 2021-07-31 PROCEDURE — 84484 ASSAY OF TROPONIN QUANT: CPT | Performed by: EMERGENCY MEDICINE

## 2021-07-31 PROCEDURE — 71046 X-RAY EXAM CHEST 2 VIEWS: CPT

## 2021-07-31 PROCEDURE — 83880 ASSAY OF NATRIURETIC PEPTIDE: CPT | Performed by: EMERGENCY MEDICINE

## 2021-07-31 PROCEDURE — 99285 EMERGENCY DEPT VISIT HI MDM: CPT | Mod: 25

## 2021-07-31 PROCEDURE — 36415 COLL VENOUS BLD VENIPUNCTURE: CPT | Performed by: EMERGENCY MEDICINE

## 2021-07-31 ASSESSMENT — ENCOUNTER SYMPTOMS
FATIGUE: 0
COUGH: 1
SHORTNESS OF BREATH: 0
HEADACHES: 0
WEAKNESS: 0
VOMITING: 0
ABDOMINAL PAIN: 0
FEVER: 0
DYSURIA: 0
CHILLS: 0

## 2021-07-31 ASSESSMENT — HEART SCORE
ECG: NORMAL
TROPONIN: LESS THAN OR EQUAL TO NORMAL LIMIT
AGE: 65+
RISK FACTORS: >2 RISK FACTORS OR HX OF ATHEROSCLEROTIC DISEASE
HEART SCORE: 4
HISTORY: SLIGHTLY SUSPICIOUS

## 2021-08-01 VITALS
OXYGEN SATURATION: 93 % | RESPIRATION RATE: 22 BRPM | DIASTOLIC BLOOD PRESSURE: 67 MMHG | SYSTOLIC BLOOD PRESSURE: 155 MMHG | TEMPERATURE: 97.4 F | HEART RATE: 80 BPM

## 2021-08-01 LAB — TROPONIN I SERPL-MCNC: <0.01 NG/ML (ref 0–0.29)

## 2021-08-01 PROCEDURE — 36415 COLL VENOUS BLD VENIPUNCTURE: CPT | Performed by: EMERGENCY MEDICINE

## 2021-08-01 PROCEDURE — 84484 ASSAY OF TROPONIN QUANT: CPT | Performed by: EMERGENCY MEDICINE

## 2021-08-01 NOTE — ED NOTES
Pt presents NAD. SKIN: NWD.   HEENT: normocephalic, PERRL, clear x 3.   CHEST: symmetrical rise, CEBBS, no CP or SOB.  ABD: NTND, no N&V or diarrhea.  EXT: [facility] x 4  Rest of exam unremarkable.

## 2021-08-01 NOTE — ED TRIAGE NOTES
"Pt brought in by [facility] EMS.  Shortly after dinner tonight, the pt states she experienced some \"dull\" chest pain with some \"delicate\" chest tightness for a few minutes, which was gone by the time EMS showed up on scene.  Pt currently denies chest pain and denies shortness of breath.  "

## 2021-08-01 NOTE — ED NOTES
Pt resting comfortably.  Pt denies chest pain and denies shortness of breath.  Will continue to monitor.

## 2021-08-01 NOTE — ED PROVIDER NOTES
ED SIGNOUT  Date/Time:7/31/2021 11:04 PM    Patient signed out to me by my colleague, Dr. Karolina Marrufo.  Please see their note for complete history and physical. Plan to follow up on repeat delta troponin.    The creation of this record is based on the scribe s observations of the work being performed by Dr. Rojas and the provider s statements to them. It was created on their behalf by Juan Jose Evangelista a trained medical scribe. This document has been checked and approved by the attending provider.      Brief HPI:  Johanna Washburn is a 89 year old female who presents to this ED from an assisted living facility via EMS for evaluation of chest pain. Patient reports chest pain which started after eating dinner tonight (7/31). She describes her pain as a pressure sensation that radiated up and down her chest. States this chest pain has now resolved. Patient reports she was outside gardening earlier today and thought the air was a bit smokey, which caused her to have a mild cough. She also notes her legs appear mildly swollen. No other complaints or concerns expressed at this time.    REMAINING ED WORKUP:  On my exam the patient appeared quite well and comfortable.  Repeat delta troponin was negative.  On my exam the patient denied any chest pain.  With negative work-up and benign exam feel she safe for discharge and close follow-up.  I will recommend that she follows up with her primary care provider in the next 5 to 7 days, given atypical story and negative work-up here will recommend that she follows up the rapid access clinic as needed.  Discussed all these findings recommendations with the patient felt reassured and comfortable with discharge.  Return precautions provided.    Vitals:  BP (!) 140/66   Pulse 81   Temp 97.4  F (36.3  C) (Oral)   Resp 20   SpO2 92%       Pertinent labs results reviewed   Results for orders placed or performed during the hospital encounter of 07/31/21   Chest XR,  PA & LAT     Impression    IMPRESSION:     The cardiac silhouette is enlarged. Fairly extensive atheromatous calcification of the arch and descending aorta.    There are are no findings of interstitial lung edema this time. No lung consolidation.    Diaphragm curvature is preserved. No pleural fluid. No pneumothorax.    Diffuse thoracic spine degenerative osteophytes. There are compression deformities of T12 and L1 as well as mid lumbar vertebra. The T12 compression deformity has slightly worsened since 2019. No displaced rib fracture is detected.   Comprehensive metabolic panel   Result Value Ref Range    Sodium 134 (L) 136 - 145 mmol/L    Potassium 3.9 3.5 - 5.0 mmol/L    Chloride 103 98 - 107 mmol/L    Carbon Dioxide (CO2) 22 22 - 31 mmol/L    Anion Gap 9 5 - 18 mmol/L    Urea Nitrogen 23 8 - 28 mg/dL    Creatinine 1.23 (H) 0.60 - 1.10 mg/dL    Calcium 9.6 8.5 - 10.5 mg/dL    Glucose 94 70 - 125 mg/dL    Alkaline Phosphatase 89 45 - 120 U/L    AST 14 0 - 40 U/L    ALT 10 0 - 45 U/L    Protein Total 6.7 6.0 - 8.0 g/dL    Albumin 3.6 3.5 - 5.0 g/dL    Bilirubin Total 0.4 0.0 - 1.0 mg/dL    GFR Estimate 38 (L) >60 mL/min/1.73m2   Result Value Ref Range    Lipase 54 (H) 0 - 52 U/L   Result Value Ref Range    Troponin I <0.01 0.00 - 0.29 ng/mL   B-Type Natriuretic Peptide ([facility] Only)   Result Value Ref Range     0 - 167 pg/mL   CBC with platelets and differential   Result Value Ref Range    WBC Count 8.4 4.0 - 11.0 10e3/uL    RBC Count 3.47 (L) 3.80 - 5.20 10e6/uL    Hemoglobin 10.0 (L) 11.7 - 15.7 g/dL    Hematocrit 30.9 (L) 35.0 - 47.0 %    MCV 89 78 - 100 fL    MCH 28.8 26.5 - 33.0 pg    MCHC 32.4 31.5 - 36.5 g/dL    RDW 13.2 10.0 - 15.0 %    Platelet Count 239 150 - 450 10e3/uL    % Neutrophils 69 %    % Lymphocytes 13 %    % Monocytes 12 %    % Eosinophils 4 %    % Basophils 1 %    % Immature Granulocytes 1 %    NRBCs per 100 WBC 0 <1 /100    Absolute Neutrophils 5.9 1.6 - 8.3 10e3/uL    Absolute Lymphocytes 1.1 0.8  - 5.3 10e3/uL    Absolute Monocytes 1.0 0.0 - 1.3 10e3/uL    Absolute Eosinophils 0.3 0.0 - 0.7 10e3/uL    Absolute Basophils 0.1 0.0 - 0.2 10e3/uL    Absolute Immature Granulocytes 0.1 (H) <=0.0 10e3/uL    Absolute NRBCs 0.0 10e3/uL   Extra Red Top Tube   Result Value Ref Range    Hold Specimen JIC    Troponin I (now)   Result Value Ref Range    Troponin I <0.01 0.00 - 0.29 ng/mL       Pertinent imaging reviewed   Please see official radiology report.  Chest XR,  PA & LAT   Final Result   IMPRESSION:       The cardiac silhouette is enlarged. Fairly extensive atheromatous calcification of the arch and descending aorta.      There are are no findings of interstitial lung edema this time. No lung consolidation.      Diaphragm curvature is preserved. No pleural fluid. No pneumothorax.      Diffuse thoracic spine degenerative osteophytes. There are compression deformities of T12 and L1 as well as mid lumbar vertebra. The T12 compression deformity has slightly worsened since 2019. No displaced rib fracture is detected.           Interventions  Medications - No data to display     ED Course/MDM:  11:04 PM Signout accepted from Dr. Marrufo.  Prior records were reviewed.  Diagnostics from this visit are reviewed.  12:59 AM My exam is benign, the patient appears well. Discussed findings and discharged.               1. Chest pain, unspecified type          I, Juan Jose Evangelista, am serving as a scribe to document services personally performed by Dr. Rojas, based on myobservation and the provider's statements to me. I, Dr. Rojas attest that Juan Jose Evangelista is acting in a scribe capacity, has observed my performance of the services and has documented them in accordance with my direction.    [facility] Emergency Department          Jaime Rojas MD  08/01/21 3761

## 2021-08-01 NOTE — ED NOTES
Bed: JNED-03  Expected date: 7/31/21  Expected time: 7:34 PM  Means of arrival: Ambulance  Comments:  [facility] - 89F chest pain w/PVCs

## 2021-08-01 NOTE — ED PROVIDER NOTES
EMERGENCY DEPARTMENT ENCOUNTER      NAME: Johanna Washburn  AGE: 89 year old female  YOB: 1928  MRN: [id number]  EVALUATION DATE & TIME: 7/31/2021  7:48 PM    PCP: Reed Shore    ED PROVIDER: Karolina Marrufo DO      Chief Complaint   Patient presents with     Chest Pain         FINAL IMPRESSION:  1. Chest pain, unspecified type          ED COURSE & MEDICAL DECISION MAKING:    Pertinent Labs & Imaging studies reviewed. (See chart for details)  7:55 PM I met with the patient to gather history and to perform my initial exam. We discussed plans for the ED course, including diagnostic testing and treatment. PPE worn: surgical mask.     89 year old female presents to the Emergency Department for evaluation of chest pain.    MEDICAL DECISION MAKING: I was concerned about this patient's  chest pain and considered multiple causes including but not limited to the following.       ?ACS: EKG does not show acute ischemic changes and cardiac enzymes are negative. Heart Score is moderate risk (4) for age and risk factors alone.    ?PE:  Wells Criteria is low risk.  D-dimer deferred.    ?Aortic Dissection: The onset of pain was neither sudden nor severe, no history of poorly controlled high blood pressure and radial / pedal pulses are symmetrical, There is no widening of the mediastinum on chest x-ray, and no marfanoid features are present.    ?Cardiac Tamponade:  EKG shows no decreased voltage, heart sounds are not diminished on exam.      ?PTX: No sign of pneumothorax on chest x-ray .     ?Esophageal Rupture: There was not preceding forceful vomiting or retching and the CXR does not show mediastinal free air.     ?Other causes considered:  pericarditis, pleural effusion, pneumonia and referred pain from the ABD      DISPOSITION PLAN:    Awaiting delta troponin.  Symptoms are very atypical, brief in nature and gone on arrival.  No EKG changes.  Pending delta troponin.  General impression is if repeat  troponin is negative, she could likely discharge.    At the conclusion of the encounter I discussed the results of all of the tests and the disposition. The questions were answered. The patient or family acknowledged understanding and was agreeable with the care plan.     MEDICATIONS GIVEN IN THE EMERGENCY:  Medications - No data to display    NEW PRESCRIPTIONS STARTED AT TODAY'S ER VISIT  New Prescriptions    No medications on file          =================================================================    HPI    Patient information was obtained from: patient     Use of : N/A       Johanna Washburn is a 89 year old female with a pertinent history of HTN, HLD, CAD, paroxysmal SVT, who presents to this ED from an assisted living facility via EMS for evaluation of chest pain.    Patient reports chest pain which started after eating dinner tonight (7/31). She describes her pain as a pressure sensation that radiated up and down her chest. States this chest pain has now resolved. Patient reports she was outside gardening earlier today and thought the air was a bit smokey, which caused her to have a mild cough. She also notes her legs appear mildly swollen but she otherwise feels in her normal state of health. Patient denies fever and chills. No covid concern. No other complaints or concerns expressed at this time.      REVIEW OF SYSTEMS   Review of Systems   Constitutional: Negative for chills, fatigue and fever.   Respiratory: Positive for cough. Negative for shortness of breath.    Cardiovascular: Positive for chest pain and leg swelling (BLE).   Gastrointestinal: Negative for abdominal pain and vomiting.   Genitourinary: Negative for dysuria.   Skin: Negative.    Neurological: Negative for syncope, weakness and headaches.   All other systems reviewed and are negative.       PAST MEDICAL HISTORY:  No past medical history on file.    PAST SURGICAL HISTORY:  Past Surgical History:   Procedure Laterality  Date     APPENDECTOMY       CATARACT EXTRACTION       HYSTERECTOMY       MASTECTOMY Bilateral      THYROIDECTOMY             CURRENT MEDICATIONS:    acetaminophen (TYLENOL) 500 MG tablet  aspirin 81 mg chewable tablet  azithromycin (ZITHROMAX) 250 MG tablet  benzonatate (TESSALON) 100 MG capsule  calcium, as carbonate, (TUMS) 200 mg calcium (500 mg) chewable tablet  ciprofloxacin HCl (CIPRO) 250 MG tablet  diclofenac epolamine (FLECTOR) 1.3 % PT12  diclofenac sodium (VOLTAREN) 1 % Gel  ergocalciferol (VITAMIN D2) 50,000 unit capsule  escitalopram oxalate (LEXAPRO) 10 MG tablet  ferrous sulfate 65 mg elemental iron  guaiFENesin (ROBITUSSIN) 100 mg/5 mL syrup  levothyroxine (SYNTHROID, LEVOTHROID) 75 MCG tablet  losartan (COZAAR) 25 MG tablet  NIFEdipine (PROCARDIA XL) 90 MG 24 hr tablet  nitroglycerin (NITROSTAT) 0.4 MG SL tablet  omeprazole (PRILOSEC) 20 MG capsule  peg 400-propylene glycol (SYSTANE) 0.4-0.3 % Drop  pramipexole (MIRAPEX) 0.25 MG tablet  REFRESH TEARS 0.5 % Drop  rosuvastatin (CRESTOR) 10 MG tablet  senna-docusate (PERICOLACE) 8.6-50 mg tablet  torsemide (DEMADEX) 5 MG tablet  triamcinolone (KENALOG) 0.1 % cream  trimethoprim (TRIMPEX) 100 mg tablet         ALLERGIES:  Allergies   Allergen Reactions     Sulfa (Sulfonamide Antibiotics) [Sulfa Drugs] Rash     Vioxx [Rofecoxib] Swelling     Augmentin Nausea     Demerol [Meperidine] Nausea     Minocin [Minocycline] Other (See Comments)     lightheaded       FAMILY HISTORY:  Family History   Problem Relation Age of Onset     Breast Cancer Other      Heart Disease Other      Diabetes Other        SOCIAL HISTORY:   Social History     Socioeconomic History     Marital status:      Spouse name: Not on file     Number of children: Not on file     Years of education: Not on file     Highest education level: Not on file   Occupational History     Not on file   Tobacco Use     Smoking status: Never Smoker     Smokeless tobacco: Never Used   Substance and  Sexual Activity     Alcohol use: No     Drug use: No     Sexual activity: Not on file   Other Topics Concern     Not on file   Social History Narrative     Not on file     Social Determinants of Health     Financial Resource Strain:      Difficulty of Paying Living Expenses:    Food Insecurity:      Worried About Running Out of Food in the Last Year:      Ran Out of Food in the Last Year:    Transportation Needs:      Lack of Transportation (Medical):      Lack of Transportation (Non-Medical):    Physical Activity:      Days of Exercise per Week:      Minutes of Exercise per Session:    Stress:      Feeling of Stress :    Social Connections:      Frequency of Communication with Friends and Family:      Frequency of Social Gatherings with Friends and Family:      Attends Hindu Services:      Active Member of Clubs or Organizations:      Attends Club or Organization Meetings:      Marital Status:    Intimate Partner Violence:      Fear of Current or Ex-Partner:      Emotionally Abused:      Physically Abused:      Sexually Abused:        VITALS:  BP (!) 169/75   Pulse 76   Temp 97.4  F (36.3  C) (Oral)   Resp 18   SpO2 96%     PHYSICAL EXAM    Physical Exam  Constitutional:       General: She is not in acute distress.  HENT:      Head: Normocephalic and atraumatic.      Mouth/Throat:      Pharynx: Oropharynx is clear.   Eyes:      Pupils: Pupils are equal, round, and reactive to light.   Cardiovascular:      Rate and Rhythm: Normal rate and regular rhythm.      Pulses: Normal pulses.      Heart sounds: Normal heart sounds.      Comments: Bilateral lower extremity edema.  Pulmonary:      Effort: Pulmonary effort is normal.      Breath sounds: Normal breath sounds.   Abdominal:      General: Abdomen is flat.      Palpations: Abdomen is soft.   Musculoskeletal:         General: Normal range of motion.   Skin:     General: Skin is warm and dry.      Capillary Refill: Capillary refill takes less than 2 seconds.    Neurological:      General: No focal deficit present.      Mental Status: She is alert and oriented to person, place, and time.           LAB:  All pertinent labs reviewed and interpreted.  Labs Ordered and Resulted from Time of ED Arrival Up to the Time of Departure from the ED   COMPREHENSIVE METABOLIC PANEL - Abnormal; Notable for the following components:       Result Value    Sodium 134 (*)     Creatinine 1.23 (*)     GFR Estimate 38 (*)     All other components within normal limits   LIPASE - Abnormal; Notable for the following components:    Lipase 54 (*)     All other components within normal limits   CBC WITH PLATELETS AND DIFFERENTIAL - Abnormal; Notable for the following components:    RBC Count 3.47 (*)     Hemoglobin 10.0 (*)     Hematocrit 30.9 (*)     Absolute Immature Granulocytes 0.1 (*)     All other components within normal limits   TROPONIN I - Normal   B-TYPE NATRIURETIC PEPTIDE ([facility] ONLY) - Normal   EXTRA RED TOP TUBE   TROPONIN I   CBC WITH PLATELETS & DIFFERENTIAL    Narrative:     The following orders were created for panel order CBC with platelets differential.  Procedure                               Abnormality         Status                     ---------                               -----------         ------                     CBC with platelets and d...[[id number]]  Abnormal            Final result                 Please view results for these tests on the individual orders.   EXTRA TUBE    Narrative:     The following orders were created for panel order [place] Draw.  Procedure                               Abnormality         Status                     ---------                               -----------         ------                     Extra Red Top Tube[[id number]]                               Final result                 Please view results for these tests on the individual orders.       RADIOLOGY:  Reviewed all pertinent imaging. Please see official radiology  report.  Chest XR,  PA & LAT   Final Result   IMPRESSION:       The cardiac silhouette is enlarged. Fairly extensive atheromatous calcification of the arch and descending aorta.      There are are no findings of interstitial lung edema this time. No lung consolidation.      Diaphragm curvature is preserved. No pleural fluid. No pneumothorax.      Diffuse thoracic spine degenerative osteophytes. There are compression deformities of T12 and L1 as well as mid lumbar vertebra. The T12 compression deformity has slightly worsened since 2019. No displaced rib fracture is detected.          EKG:    Performed at: 19:55    Impression: Sinus rhythm with PAC's, left anterior fascicular block, minimal voltage criteria for LVH    Rate: 73 bpm  Rhythm: Sinus  Axis: -45  NE Interval: 182 ms  QRS Interval: 112 ms  QTc Interval: 453 ms  ST Changes: No acute changes  Comparison: 9/2/2018: PAC's are now present, minimal criteria for anterior and inferior infact are no longer present, T wave inversion no longer evident in interior leads, and nonspecific T wave abnormality no longer evident in anterior leads.     I have independently reviewed and interpreted the EKG(s) documented above.      I, Adan Evans, am serving as a scribe to document services personally performed by Dr. Karolina Marrufo based on my observation and the provider's statements to me. I, Karolina Marrufo, DO attest that Adan Evans is acting in a scribe capacity, has observed my performance of the services and has documented them in accordance with my direction.    Karolina Marrufo DO  Emergency Medicine  [facility] EMERGENCY DEPARTMENT  [facility] [facility] [id number] [phone]  Dept: [phone]     Karolina Marrufo DO  07/31/21 [id number]

## 2021-08-01 NOTE — ED PROVIDER NOTES
Expected Patient Referral to ED  7:32 PM    Referring Clinic/Provider:  Assisted Living    Reason for referral/Clinical facts:  Chest tightness, edema    Recommendations provided:  Send to ED    Caller was informed that this institution does  possess the capabilities and/or resources to provide for patient and should be transferred to our institution.    Based on the information provided, discussed that this patient likely is nota good candidate for direct admission to this institution and that provider could proceed as such.  If however direct admit is sought and any hurdles encountered, this ED would be happy to see the patient and evaluate.    Informed caller that recommendations provided are recommendations based only on the facts provided and that they responsible to accept or reject the advice, or to seek a formal in person consultation as needed and that this ED will see/treat patient should they arrive.      Karolina Marrufo DO  Emergency Medicine  [facility] EMERGENCY DEPARTMENT  [address] [place] [id number]  [phone]     Karolina Marrufo DO  07/1935

## 2021-08-04 ENCOUNTER — DOCUMENTATION ONLY (OUTPATIENT)
Dept: OTHER | Facility: CLINIC | Age: 86
End: 2021-08-04

## 2021-08-04 LAB
ATRIAL RATE - MUSE: 73 BPM
DIASTOLIC BLOOD PRESSURE - MUSE: 80 MMHG
INTERPRETATION ECG - MUSE: NORMAL
P AXIS - MUSE: 48 DEGREES
PR INTERVAL - MUSE: 182 MS
QRS DURATION - MUSE: 112 MS
QT - MUSE: 412 MS
QTC - MUSE: 453 MS
R AXIS - MUSE: -45 DEGREES
SYSTOLIC BLOOD PRESSURE - MUSE: 181 MMHG
T AXIS - MUSE: 51 DEGREES
VENTRICULAR RATE- MUSE: 73 BPM

## 2021-10-16 ENCOUNTER — HEALTH MAINTENANCE LETTER (OUTPATIENT)
Age: 86
End: 2021-10-16

## 2021-10-19 ENCOUNTER — HOSPITAL ENCOUNTER (INPATIENT)
Facility: HOSPITAL | Age: 86
LOS: 9 days | Discharge: NURSING, REHAB OR LONG-TERM CARE | DRG: 481 | End: 2021-10-28
Attending: EMERGENCY MEDICINE | Admitting: STUDENT IN AN ORGANIZED HEALTH CARE EDUCATION/TRAINING PROGRAM
Payer: MEDICARE

## 2021-10-19 ENCOUNTER — APPOINTMENT (OUTPATIENT)
Dept: CT IMAGING | Facility: HOSPITAL | Age: 86
DRG: 481 | End: 2021-10-19
Attending: EMERGENCY MEDICINE
Payer: MEDICARE

## 2021-10-19 ENCOUNTER — APPOINTMENT (OUTPATIENT)
Dept: RADIOLOGY | Facility: HOSPITAL | Age: 86
DRG: 481 | End: 2021-10-19
Attending: EMERGENCY MEDICINE
Payer: MEDICARE

## 2021-10-19 DIAGNOSIS — S42.201A CLOSED FRACTURE OF PROXIMAL END OF RIGHT HUMERUS, UNSPECIFIED FRACTURE MORPHOLOGY, INITIAL ENCOUNTER: ICD-10-CM

## 2021-10-19 DIAGNOSIS — E44.1 MILD MALNUTRITION (H): ICD-10-CM

## 2021-10-19 DIAGNOSIS — S72.144A CLOSED NONDISPLACED INTERTROCHANTERIC FRACTURE OF RIGHT FEMUR, INITIAL ENCOUNTER (H): ICD-10-CM

## 2021-10-19 DIAGNOSIS — I10 ESSENTIAL HYPERTENSION: ICD-10-CM

## 2021-10-19 DIAGNOSIS — I25.10 CORONARY ARTERY DISEASE INVOLVING NATIVE HEART WITHOUT ANGINA PECTORIS, UNSPECIFIED VESSEL OR LESION TYPE: Primary | ICD-10-CM

## 2021-10-19 LAB
ANION GAP SERPL CALCULATED.3IONS-SCNC: 11 MMOL/L (ref 5–18)
BUN SERPL-MCNC: 25 MG/DL (ref 8–28)
CALCIUM SERPL-MCNC: 10.2 MG/DL (ref 8.5–10.5)
CHLORIDE BLD-SCNC: 104 MMOL/L (ref 98–107)
CO2 SERPL-SCNC: 21 MMOL/L (ref 22–31)
CREAT SERPL-MCNC: 1.27 MG/DL (ref 0.6–1.1)
ERYTHROCYTE [DISTWIDTH] IN BLOOD BY AUTOMATED COUNT: 13.4 % (ref 10–15)
GFR SERPL CREATININE-BSD FRML MDRD: 36 ML/MIN/1.73M2
GLUCOSE BLD-MCNC: 141 MG/DL (ref 70–125)
HCT VFR BLD AUTO: 33.3 % (ref 35–47)
HGB BLD-MCNC: 10.9 G/DL (ref 11.7–15.7)
MCH RBC QN AUTO: 29 PG (ref 26.5–33)
MCHC RBC AUTO-ENTMCNC: 32.7 G/DL (ref 31.5–36.5)
MCV RBC AUTO: 89 FL (ref 78–100)
PLATELET # BLD AUTO: 263 10E3/UL (ref 150–450)
POTASSIUM BLD-SCNC: 4.1 MMOL/L (ref 3.5–5)
RBC # BLD AUTO: 3.76 10E6/UL (ref 3.8–5.2)
SARS-COV-2 RNA RESP QL NAA+PROBE: NEGATIVE
SODIUM SERPL-SCNC: 136 MMOL/L (ref 136–145)
WBC # BLD AUTO: 11.3 10E3/UL (ref 4–11)

## 2021-10-19 PROCEDURE — 87635 SARS-COV-2 COVID-19 AMP PRB: CPT | Performed by: EMERGENCY MEDICINE

## 2021-10-19 PROCEDURE — C9803 HOPD COVID-19 SPEC COLLECT: HCPCS

## 2021-10-19 PROCEDURE — 250N000011 HC RX IP 250 OP 636: Performed by: EMERGENCY MEDICINE

## 2021-10-19 PROCEDURE — 93005 ELECTROCARDIOGRAM TRACING: CPT | Performed by: EMERGENCY MEDICINE

## 2021-10-19 PROCEDURE — 82374 ASSAY BLOOD CARBON DIOXIDE: CPT | Performed by: EMERGENCY MEDICINE

## 2021-10-19 PROCEDURE — 73030 X-RAY EXAM OF SHOULDER: CPT | Mod: RT

## 2021-10-19 PROCEDURE — 99285 EMERGENCY DEPT VISIT HI MDM: CPT | Mod: 25

## 2021-10-19 PROCEDURE — 70450 CT HEAD/BRAIN W/O DYE: CPT

## 2021-10-19 PROCEDURE — 36415 COLL VENOUS BLD VENIPUNCTURE: CPT | Performed by: EMERGENCY MEDICINE

## 2021-10-19 PROCEDURE — 73502 X-RAY EXAM HIP UNI 2-3 VIEWS: CPT

## 2021-10-19 PROCEDURE — 73700 CT LOWER EXTREMITY W/O DYE: CPT | Mod: RT

## 2021-10-19 PROCEDURE — 250N000013 HC RX MED GY IP 250 OP 250 PS 637: Performed by: STUDENT IN AN ORGANIZED HEALTH CARE EDUCATION/TRAINING PROGRAM

## 2021-10-19 PROCEDURE — 85027 COMPLETE CBC AUTOMATED: CPT | Performed by: EMERGENCY MEDICINE

## 2021-10-19 PROCEDURE — 72125 CT NECK SPINE W/O DYE: CPT

## 2021-10-19 PROCEDURE — 120N000001 HC R&B MED SURG/OB

## 2021-10-19 PROCEDURE — 96374 THER/PROPH/DIAG INJ IV PUSH: CPT

## 2021-10-19 RX ORDER — ROSUVASTATIN CALCIUM 10 MG/1
10 TABLET, COATED ORAL AT BEDTIME
Status: DISCONTINUED | OUTPATIENT
Start: 2021-10-19 | End: 2021-10-28 | Stop reason: HOSPADM

## 2021-10-19 RX ORDER — NALOXONE HYDROCHLORIDE 0.4 MG/ML
0.2 INJECTION, SOLUTION INTRAMUSCULAR; INTRAVENOUS; SUBCUTANEOUS
Status: DISCONTINUED | OUTPATIENT
Start: 2021-10-19 | End: 2021-10-28 | Stop reason: HOSPADM

## 2021-10-19 RX ORDER — ESCITALOPRAM OXALATE 20 MG/1
20 TABLET ORAL DAILY
Status: DISCONTINUED | OUTPATIENT
Start: 2021-10-20 | End: 2021-10-28 | Stop reason: HOSPADM

## 2021-10-19 RX ORDER — PRAMIPEXOLE DIHYDROCHLORIDE 0.25 MG/1
.25-.5 TABLET ORAL
COMMUNITY
End: 2021-11-02

## 2021-10-19 RX ORDER — NALOXONE HYDROCHLORIDE 0.4 MG/ML
0.4 INJECTION, SOLUTION INTRAMUSCULAR; INTRAVENOUS; SUBCUTANEOUS
Status: DISCONTINUED | OUTPATIENT
Start: 2021-10-19 | End: 2021-10-28 | Stop reason: HOSPADM

## 2021-10-19 RX ORDER — MORPHINE SULFATE 4 MG/ML
4 INJECTION, SOLUTION INTRAMUSCULAR; INTRAVENOUS ONCE
Status: COMPLETED | OUTPATIENT
Start: 2021-10-19 | End: 2021-10-19

## 2021-10-19 RX ORDER — CALCIUM CARBONATE 500 MG/1
500 TABLET, CHEWABLE ORAL 2 TIMES DAILY
Status: DISCONTINUED | OUTPATIENT
Start: 2021-10-19 | End: 2021-10-28 | Stop reason: HOSPADM

## 2021-10-19 RX ORDER — LIDOCAINE 50 MG/G
1 PATCH TOPICAL DAILY PRN
COMMUNITY
End: 2021-11-22

## 2021-10-19 RX ORDER — PANTOPRAZOLE SODIUM 20 MG/1
40 TABLET, DELAYED RELEASE ORAL
Status: DISCONTINUED | OUTPATIENT
Start: 2021-10-20 | End: 2021-10-28 | Stop reason: HOSPADM

## 2021-10-19 RX ORDER — TORSEMIDE 5 MG/1
5 TABLET ORAL DAILY
Status: DISCONTINUED | OUTPATIENT
Start: 2021-10-20 | End: 2021-10-28

## 2021-10-19 RX ORDER — LIDOCAINE 40 MG/G
CREAM TOPICAL
Status: DISCONTINUED | OUTPATIENT
Start: 2021-10-19 | End: 2021-10-27

## 2021-10-19 RX ORDER — LOSARTAN POTASSIUM 25 MG/1
100 TABLET ORAL DAILY
Status: DISCONTINUED | OUTPATIENT
Start: 2021-10-20 | End: 2021-10-28 | Stop reason: HOSPADM

## 2021-10-19 RX ORDER — PRAMIPEXOLE DIHYDROCHLORIDE 0.25 MG/1
.25-.5 TABLET ORAL
Status: DISCONTINUED | OUTPATIENT
Start: 2021-10-19 | End: 2021-10-28 | Stop reason: HOSPADM

## 2021-10-19 RX ORDER — HYDROMORPHONE HCL IN WATER/PF 6 MG/30 ML
0.2 PATIENT CONTROLLED ANALGESIA SYRINGE INTRAVENOUS EVERY 4 HOURS PRN
Status: DISCONTINUED | OUTPATIENT
Start: 2021-10-19 | End: 2021-10-25

## 2021-10-19 RX ORDER — ACETAMINOPHEN 325 MG/1
650 TABLET ORAL EVERY 6 HOURS PRN
Status: DISCONTINUED | OUTPATIENT
Start: 2021-10-19 | End: 2021-10-28 | Stop reason: HOSPADM

## 2021-10-19 RX ORDER — PROCHLORPERAZINE MALEATE 5 MG
5 TABLET ORAL EVERY 6 HOURS PRN
Status: DISCONTINUED | OUTPATIENT
Start: 2021-10-19 | End: 2021-10-28 | Stop reason: HOSPADM

## 2021-10-19 RX ORDER — CALCIUM CARBONATE 500 MG/1
1 TABLET, CHEWABLE ORAL
COMMUNITY
End: 2021-11-02

## 2021-10-19 RX ORDER — PRAMIPEXOLE DIHYDROCHLORIDE 0.25 MG/1
0.25 TABLET ORAL AT BEDTIME
Status: DISCONTINUED | OUTPATIENT
Start: 2021-10-19 | End: 2021-10-28 | Stop reason: HOSPADM

## 2021-10-19 RX ORDER — LOSARTAN POTASSIUM 100 MG/1
100 TABLET ORAL DAILY
COMMUNITY

## 2021-10-19 RX ORDER — PROCHLORPERAZINE 25 MG
12.5 SUPPOSITORY, RECTAL RECTAL EVERY 12 HOURS PRN
Status: DISCONTINUED | OUTPATIENT
Start: 2021-10-19 | End: 2021-10-28 | Stop reason: HOSPADM

## 2021-10-19 RX ORDER — NITROGLYCERIN 0.4 MG/1
0.4 TABLET SUBLINGUAL EVERY 5 MIN PRN
Status: DISCONTINUED | OUTPATIENT
Start: 2021-10-19 | End: 2021-10-28 | Stop reason: HOSPADM

## 2021-10-19 RX ORDER — ACETAMINOPHEN 325 MG/1
650 TABLET ORAL
Status: ON HOLD | COMMUNITY
End: 2021-10-22

## 2021-10-19 RX ORDER — LEVOTHYROXINE SODIUM 25 UG/1
75 TABLET ORAL DAILY
Status: DISCONTINUED | OUTPATIENT
Start: 2021-10-20 | End: 2021-10-28 | Stop reason: HOSPADM

## 2021-10-19 RX ORDER — ESCITALOPRAM OXALATE 20 MG/1
10 TABLET ORAL DAILY
COMMUNITY

## 2021-10-19 RX ADMIN — MORPHINE SULFATE 4 MG: 4 INJECTION INTRAVENOUS at 21:06

## 2021-10-19 RX ADMIN — OXYCODONE HYDROCHLORIDE 2.5 MG: 5 TABLET ORAL at 23:21

## 2021-10-19 ASSESSMENT — ACTIVITIES OF DAILY LIVING (ADL)
WEAR_GLASSES_OR_BLIND: NO
DOING_ERRANDS_INDEPENDENTLY_DIFFICULTY: NO
FALL_HISTORY_WITHIN_LAST_SIX_MONTHS: YES
CONCENTRATING,_REMEMBERING_OR_MAKING_DECISIONS_DIFFICULTY: NO
DRESSING/BATHING_DIFFICULTY: NO
WALKING_OR_CLIMBING_STAIRS_DIFFICULTY: NO
HEARING_DIFFICULTY_OR_DEAF: NO
NUMBER_OF_TIMES_PATIENT_HAS_FALLEN_WITHIN_LAST_SIX_MONTHS: 3
EATING/SWALLOWING: SWALLOWING SOLID FOOD
EQUIPMENT_CURRENTLY_USED_AT_HOME: WALKER, ROLLING
PATIENT_/_FAMILY_COMMUNICATION_STYLE: SPOKEN LANGUAGE (ENGLISH OR BILINGUAL)
TOILETING_ISSUES: NO
DIFFICULTY_EATING/SWALLOWING: YES
DIFFICULTY_COMMUNICATING: NO

## 2021-10-19 ASSESSMENT — MIFFLIN-ST. JEOR: SCORE: 1011.43

## 2021-10-20 ENCOUNTER — APPOINTMENT (OUTPATIENT)
Dept: CARDIOLOGY | Facility: HOSPITAL | Age: 86
DRG: 481 | End: 2021-10-20
Attending: INTERNAL MEDICINE
Payer: MEDICARE

## 2021-10-20 ENCOUNTER — ANESTHESIA (OUTPATIENT)
Dept: SURGERY | Facility: HOSPITAL | Age: 86
DRG: 481 | End: 2021-10-20
Payer: MEDICARE

## 2021-10-20 ENCOUNTER — ANESTHESIA EVENT (OUTPATIENT)
Dept: SURGERY | Facility: HOSPITAL | Age: 86
DRG: 481 | End: 2021-10-20
Payer: MEDICARE

## 2021-10-20 ENCOUNTER — APPOINTMENT (OUTPATIENT)
Dept: RADIOLOGY | Facility: HOSPITAL | Age: 86
DRG: 481 | End: 2021-10-20
Attending: STUDENT IN AN ORGANIZED HEALTH CARE EDUCATION/TRAINING PROGRAM
Payer: MEDICARE

## 2021-10-20 ENCOUNTER — ANCILLARY PROCEDURE (OUTPATIENT)
Dept: ULTRASOUND IMAGING | Facility: HOSPITAL | Age: 86
DRG: 481 | End: 2021-10-20
Attending: ANESTHESIOLOGY
Payer: MEDICARE

## 2021-10-20 LAB
ABO/RH(D): NORMAL
ALBUMIN SERPL-MCNC: 3.6 G/DL (ref 3.5–5)
ALP SERPL-CCNC: 90 U/L (ref 45–120)
ALT SERPL W P-5'-P-CCNC: 12 U/L (ref 0–45)
ANION GAP SERPL CALCULATED.3IONS-SCNC: 8 MMOL/L (ref 5–18)
ANION GAP SERPL CALCULATED.3IONS-SCNC: 8 MMOL/L (ref 5–18)
ANTIBODY SCREEN: NEGATIVE
AST SERPL W P-5'-P-CCNC: 17 U/L (ref 0–40)
BILIRUB SERPL-MCNC: 0.3 MG/DL (ref 0–1)
BUN SERPL-MCNC: 19 MG/DL (ref 8–28)
BUN SERPL-MCNC: 22 MG/DL (ref 8–28)
CALCIUM SERPL-MCNC: 9 MG/DL (ref 8.5–10.5)
CALCIUM SERPL-MCNC: 9.5 MG/DL (ref 8.5–10.5)
CHLORIDE BLD-SCNC: 106 MMOL/L (ref 98–107)
CHLORIDE BLD-SCNC: 109 MMOL/L (ref 98–107)
CO2 SERPL-SCNC: 20 MMOL/L (ref 22–31)
CO2 SERPL-SCNC: 23 MMOL/L (ref 22–31)
CREAT SERPL-MCNC: 0.96 MG/DL (ref 0.6–1.1)
CREAT SERPL-MCNC: 1.01 MG/DL (ref 0.6–1.1)
ERYTHROCYTE [DISTWIDTH] IN BLOOD BY AUTOMATED COUNT: 13.3 % (ref 10–15)
GFR SERPL CREATININE-BSD FRML MDRD: 48 ML/MIN/1.73M2
GFR SERPL CREATININE-BSD FRML MDRD: 51 ML/MIN/1.73M2
GLUCOSE BLD-MCNC: 177 MG/DL (ref 70–125)
GLUCOSE BLD-MCNC: 186 MG/DL (ref 70–125)
HCT VFR BLD AUTO: 29.1 % (ref 35–47)
HGB BLD-MCNC: 9.1 G/DL (ref 11.7–15.7)
LVEF ECHO: NORMAL
MAGNESIUM SERPL-MCNC: 1.8 MG/DL (ref 1.8–2.6)
MCH RBC QN AUTO: 28.7 PG (ref 26.5–33)
MCHC RBC AUTO-ENTMCNC: 31.3 G/DL (ref 31.5–36.5)
MCV RBC AUTO: 92 FL (ref 78–100)
PLATELET # BLD AUTO: 196 10E3/UL (ref 150–450)
POTASSIUM BLD-SCNC: 4 MMOL/L (ref 3.5–5)
POTASSIUM BLD-SCNC: 4 MMOL/L (ref 3.5–5)
PROT SERPL-MCNC: 6 G/DL (ref 6–8)
RBC # BLD AUTO: 3.17 10E6/UL (ref 3.8–5.2)
SODIUM SERPL-SCNC: 137 MMOL/L (ref 136–145)
SODIUM SERPL-SCNC: 137 MMOL/L (ref 136–145)
SPECIMEN EXPIRATION DATE: NORMAL
WBC # BLD AUTO: 10.5 10E3/UL (ref 4–11)

## 2021-10-20 PROCEDURE — C1713 ANCHOR/SCREW BN/BN,TIS/BN: HCPCS | Performed by: STUDENT IN AN ORGANIZED HEALTH CARE EDUCATION/TRAINING PROGRAM

## 2021-10-20 PROCEDURE — 250N000011 HC RX IP 250 OP 636: Performed by: NURSE ANESTHETIST, CERTIFIED REGISTERED

## 2021-10-20 PROCEDURE — 80053 COMPREHEN METABOLIC PANEL: CPT | Performed by: STUDENT IN AN ORGANIZED HEALTH CARE EDUCATION/TRAINING PROGRAM

## 2021-10-20 PROCEDURE — 93306 TTE W/DOPPLER COMPLETE: CPT

## 2021-10-20 PROCEDURE — 120N000001 HC R&B MED SURG/OB

## 2021-10-20 PROCEDURE — 36415 COLL VENOUS BLD VENIPUNCTURE: CPT | Performed by: STUDENT IN AN ORGANIZED HEALTH CARE EDUCATION/TRAINING PROGRAM

## 2021-10-20 PROCEDURE — 250N000009 HC RX 250: Performed by: FAMILY MEDICINE

## 2021-10-20 PROCEDURE — 999N000182 XR SURGERY CARM FLUORO GREATER THAN 5 MIN

## 2021-10-20 PROCEDURE — 250N000013 HC RX MED GY IP 250 OP 250 PS 637: Performed by: STUDENT IN AN ORGANIZED HEALTH CARE EDUCATION/TRAINING PROGRAM

## 2021-10-20 PROCEDURE — 250N000011 HC RX IP 250 OP 636: Performed by: STUDENT IN AN ORGANIZED HEALTH CARE EDUCATION/TRAINING PROGRAM

## 2021-10-20 PROCEDURE — 258N000003 HC RX IP 258 OP 636: Performed by: STUDENT IN AN ORGANIZED HEALTH CARE EDUCATION/TRAINING PROGRAM

## 2021-10-20 PROCEDURE — 99207 PR NO CHARGE LOS: CPT | Performed by: FAMILY MEDICINE

## 2021-10-20 PROCEDURE — 85027 COMPLETE CBC AUTOMATED: CPT | Performed by: STUDENT IN AN ORGANIZED HEALTH CARE EDUCATION/TRAINING PROGRAM

## 2021-10-20 PROCEDURE — 36415 COLL VENOUS BLD VENIPUNCTURE: CPT | Performed by: ANESTHESIOLOGY

## 2021-10-20 PROCEDURE — 86901 BLOOD TYPING SEROLOGIC RH(D): CPT | Performed by: ANESTHESIOLOGY

## 2021-10-20 PROCEDURE — 82374 ASSAY BLOOD CARBON DIOXIDE: CPT | Performed by: ANESTHESIOLOGY

## 2021-10-20 PROCEDURE — C1769 GUIDE WIRE: HCPCS | Performed by: STUDENT IN AN ORGANIZED HEALTH CARE EDUCATION/TRAINING PROGRAM

## 2021-10-20 PROCEDURE — 0QS636Z REPOSITION RIGHT UPPER FEMUR WITH INTRAMEDULLARY INTERNAL FIXATION DEVICE, PERCUTANEOUS APPROACH: ICD-10-PCS | Performed by: STUDENT IN AN ORGANIZED HEALTH CARE EDUCATION/TRAINING PROGRAM

## 2021-10-20 PROCEDURE — 250N000011 HC RX IP 250 OP 636: Performed by: ORTHOPAEDIC SURGERY

## 2021-10-20 PROCEDURE — 250N000011 HC RX IP 250 OP 636: Performed by: ANESTHESIOLOGY

## 2021-10-20 PROCEDURE — 250N000013 HC RX MED GY IP 250 OP 250 PS 637: Performed by: PHYSICIAN ASSISTANT

## 2021-10-20 PROCEDURE — 93306 TTE W/DOPPLER COMPLETE: CPT | Mod: 26 | Performed by: INTERNAL MEDICINE

## 2021-10-20 PROCEDURE — 250N000025 HC SEVOFLURANE, PER MIN: Performed by: STUDENT IN AN ORGANIZED HEALTH CARE EDUCATION/TRAINING PROGRAM

## 2021-10-20 PROCEDURE — 272N000001 HC OR GENERAL SUPPLY STERILE: Performed by: STUDENT IN AN ORGANIZED HEALTH CARE EDUCATION/TRAINING PROGRAM

## 2021-10-20 PROCEDURE — 258N000003 HC RX IP 258 OP 636: Performed by: NURSE ANESTHETIST, CERTIFIED REGISTERED

## 2021-10-20 PROCEDURE — 250N000011 HC RX IP 250 OP 636: Performed by: FAMILY MEDICINE

## 2021-10-20 PROCEDURE — 258N000003 HC RX IP 258 OP 636: Performed by: PHYSICIAN ASSISTANT

## 2021-10-20 PROCEDURE — 99222 1ST HOSP IP/OBS MODERATE 55: CPT | Performed by: INTERNAL MEDICINE

## 2021-10-20 PROCEDURE — 999N000141 HC STATISTIC PRE-PROCEDURE NURSING ASSESSMENT: Performed by: STUDENT IN AN ORGANIZED HEALTH CARE EDUCATION/TRAINING PROGRAM

## 2021-10-20 PROCEDURE — 710N000009 HC RECOVERY PHASE 1, LEVEL 1, PER MIN: Performed by: STUDENT IN AN ORGANIZED HEALTH CARE EDUCATION/TRAINING PROGRAM

## 2021-10-20 PROCEDURE — 360N000083 HC SURGERY LEVEL 3 W/ FLUORO, PER MIN: Performed by: STUDENT IN AN ORGANIZED HEALTH CARE EDUCATION/TRAINING PROGRAM

## 2021-10-20 PROCEDURE — 370N000017 HC ANESTHESIA TECHNICAL FEE, PER MIN: Performed by: STUDENT IN AN ORGANIZED HEALTH CARE EDUCATION/TRAINING PROGRAM

## 2021-10-20 PROCEDURE — 250N000009 HC RX 250: Performed by: NURSE ANESTHETIST, CERTIFIED REGISTERED

## 2021-10-20 PROCEDURE — 258N000003 HC RX IP 258 OP 636: Performed by: ANESTHESIOLOGY

## 2021-10-20 PROCEDURE — 99222 1ST HOSP IP/OBS MODERATE 55: CPT | Mod: AI | Performed by: STUDENT IN AN ORGANIZED HEALTH CARE EDUCATION/TRAINING PROGRAM

## 2021-10-20 PROCEDURE — 83735 ASSAY OF MAGNESIUM: CPT | Performed by: ANESTHESIOLOGY

## 2021-10-20 DEVICE — IMP SCR SYN 5.0 TI LOCK T25 STARDRIVE 40MM 04.005.530: Type: IMPLANTABLE DEVICE | Site: HIP | Status: FUNCTIONAL

## 2021-10-20 DEVICE — IMP NAIL SYN CAN FEM PROX TFNA 11X170MM 130D 04.037.142S: Type: IMPLANTABLE DEVICE | Site: HIP | Status: FUNCTIONAL

## 2021-10-20 DEVICE — IMP SCR SYN TFNA FENESTRATED LAG 95MM 04.038.195S: Type: IMPLANTABLE DEVICE | Site: HIP | Status: FUNCTIONAL

## 2021-10-20 RX ORDER — ONDANSETRON 2 MG/ML
4 INJECTION INTRAMUSCULAR; INTRAVENOUS EVERY 6 HOURS PRN
Status: DISCONTINUED | OUTPATIENT
Start: 2021-10-20 | End: 2021-10-28 | Stop reason: HOSPADM

## 2021-10-20 RX ORDER — AMOXICILLIN 250 MG
1 CAPSULE ORAL 2 TIMES DAILY
Status: DISCONTINUED | OUTPATIENT
Start: 2021-10-20 | End: 2021-10-28 | Stop reason: HOSPADM

## 2021-10-20 RX ORDER — FENTANYL CITRATE 50 UG/ML
INJECTION, SOLUTION INTRAMUSCULAR; INTRAVENOUS PRN
Status: DISCONTINUED | OUTPATIENT
Start: 2021-10-20 | End: 2021-10-20

## 2021-10-20 RX ORDER — SODIUM CHLORIDE, SODIUM LACTATE, POTASSIUM CHLORIDE, CALCIUM CHLORIDE 600; 310; 30; 20 MG/100ML; MG/100ML; MG/100ML; MG/100ML
INJECTION, SOLUTION INTRAVENOUS CONTINUOUS
Status: DISCONTINUED | OUTPATIENT
Start: 2021-10-20 | End: 2021-10-20 | Stop reason: HOSPADM

## 2021-10-20 RX ORDER — OXYCODONE HYDROCHLORIDE 5 MG/1
5 TABLET ORAL EVERY 4 HOURS PRN
Status: DISCONTINUED | OUTPATIENT
Start: 2021-10-20 | End: 2021-10-20 | Stop reason: HOSPADM

## 2021-10-20 RX ORDER — SODIUM CHLORIDE, SODIUM LACTATE, POTASSIUM CHLORIDE, CALCIUM CHLORIDE 600; 310; 30; 20 MG/100ML; MG/100ML; MG/100ML; MG/100ML
INJECTION, SOLUTION INTRAVENOUS CONTINUOUS
Status: DISCONTINUED | OUTPATIENT
Start: 2021-10-20 | End: 2021-10-21

## 2021-10-20 RX ORDER — LIDOCAINE 40 MG/G
CREAM TOPICAL
Status: DISCONTINUED | OUTPATIENT
Start: 2021-10-20 | End: 2021-10-28 | Stop reason: HOSPADM

## 2021-10-20 RX ORDER — HYDROMORPHONE HCL IN WATER/PF 6 MG/30 ML
0.4 PATIENT CONTROLLED ANALGESIA SYRINGE INTRAVENOUS EVERY 5 MIN PRN
Status: DISCONTINUED | OUTPATIENT
Start: 2021-10-20 | End: 2021-10-20 | Stop reason: HOSPADM

## 2021-10-20 RX ORDER — ONDANSETRON 2 MG/ML
INJECTION INTRAMUSCULAR; INTRAVENOUS PRN
Status: DISCONTINUED | OUTPATIENT
Start: 2021-10-20 | End: 2021-10-20

## 2021-10-20 RX ORDER — DEXAMETHASONE SODIUM PHOSPHATE 4 MG/ML
INJECTION, SOLUTION INTRA-ARTICULAR; INTRALESIONAL; INTRAMUSCULAR; INTRAVENOUS; SOFT TISSUE PRN
Status: DISCONTINUED | OUTPATIENT
Start: 2021-10-20 | End: 2021-10-20

## 2021-10-20 RX ORDER — HYDRALAZINE HYDROCHLORIDE 20 MG/ML
5 INJECTION INTRAMUSCULAR; INTRAVENOUS EVERY 6 HOURS PRN
Status: DISCONTINUED | OUTPATIENT
Start: 2021-10-20 | End: 2021-10-28 | Stop reason: HOSPADM

## 2021-10-20 RX ORDER — CEFAZOLIN SODIUM 2 G/100ML
2 INJECTION, SOLUTION INTRAVENOUS
Status: COMPLETED | OUTPATIENT
Start: 2021-10-20 | End: 2021-10-20

## 2021-10-20 RX ORDER — ACETAMINOPHEN 325 MG/1
975 TABLET ORAL EVERY 8 HOURS
Status: COMPLETED | OUTPATIENT
Start: 2021-10-20 | End: 2021-10-23

## 2021-10-20 RX ORDER — LIDOCAINE HYDROCHLORIDE 20 MG/ML
INJECTION, SOLUTION INFILTRATION; PERINEURAL PRN
Status: DISCONTINUED | OUTPATIENT
Start: 2021-10-20 | End: 2021-10-20

## 2021-10-20 RX ORDER — KETAMINE HYDROCHLORIDE 50 MG/ML
INJECTION, SOLUTION INTRAMUSCULAR; INTRAVENOUS PRN
Status: DISCONTINUED | OUTPATIENT
Start: 2021-10-20 | End: 2021-10-20

## 2021-10-20 RX ORDER — ONDANSETRON 2 MG/ML
4 INJECTION INTRAMUSCULAR; INTRAVENOUS EVERY 30 MIN PRN
Status: DISCONTINUED | OUTPATIENT
Start: 2021-10-20 | End: 2021-10-20 | Stop reason: HOSPADM

## 2021-10-20 RX ORDER — LABETALOL HYDROCHLORIDE 5 MG/ML
5 INJECTION, SOLUTION INTRAVENOUS EVERY 5 MIN PRN
Status: DISCONTINUED | OUTPATIENT
Start: 2021-10-20 | End: 2021-10-20 | Stop reason: HOSPADM

## 2021-10-20 RX ORDER — OXYCODONE HYDROCHLORIDE 5 MG/1
5 TABLET ORAL EVERY 4 HOURS PRN
Status: DISCONTINUED | OUTPATIENT
Start: 2021-10-20 | End: 2021-10-25

## 2021-10-20 RX ORDER — FENTANYL CITRATE 50 UG/ML
50 INJECTION, SOLUTION INTRAMUSCULAR; INTRAVENOUS
Status: DISCONTINUED | OUTPATIENT
Start: 2021-10-20 | End: 2021-10-20 | Stop reason: HOSPADM

## 2021-10-20 RX ORDER — ASPIRIN 81 MG/1
81 TABLET ORAL 2 TIMES DAILY
Status: DISCONTINUED | OUTPATIENT
Start: 2021-10-20 | End: 2021-10-25

## 2021-10-20 RX ORDER — FENTANYL CITRATE 50 UG/ML
50 INJECTION, SOLUTION INTRAMUSCULAR; INTRAVENOUS EVERY 5 MIN PRN
Status: DISCONTINUED | OUTPATIENT
Start: 2021-10-20 | End: 2021-10-20 | Stop reason: HOSPADM

## 2021-10-20 RX ORDER — METOPROLOL TARTRATE 1 MG/ML
5 INJECTION, SOLUTION INTRAVENOUS ONCE
Status: COMPLETED | OUTPATIENT
Start: 2021-10-20 | End: 2021-10-20

## 2021-10-20 RX ORDER — HALOPERIDOL 5 MG/ML
1 INJECTION INTRAMUSCULAR
Status: DISCONTINUED | OUTPATIENT
Start: 2021-10-20 | End: 2021-10-20 | Stop reason: HOSPADM

## 2021-10-20 RX ORDER — CEFAZOLIN SODIUM 2 G/100ML
2 INJECTION, SOLUTION INTRAVENOUS SEE ADMIN INSTRUCTIONS
Status: DISCONTINUED | OUTPATIENT
Start: 2021-10-20 | End: 2021-10-20 | Stop reason: HOSPADM

## 2021-10-20 RX ORDER — LIDOCAINE 40 MG/G
CREAM TOPICAL
Status: DISCONTINUED | OUTPATIENT
Start: 2021-10-20 | End: 2021-10-20 | Stop reason: HOSPADM

## 2021-10-20 RX ORDER — ONDANSETRON 4 MG/1
4 TABLET, ORALLY DISINTEGRATING ORAL EVERY 30 MIN PRN
Status: DISCONTINUED | OUTPATIENT
Start: 2021-10-20 | End: 2021-10-20 | Stop reason: HOSPADM

## 2021-10-20 RX ORDER — PROCHLORPERAZINE MALEATE 5 MG
5 TABLET ORAL EVERY 6 HOURS PRN
Status: DISCONTINUED | OUTPATIENT
Start: 2021-10-20 | End: 2021-10-28 | Stop reason: HOSPADM

## 2021-10-20 RX ORDER — POLYETHYLENE GLYCOL 3350 17 G/17G
17 POWDER, FOR SOLUTION ORAL DAILY
Status: DISCONTINUED | OUTPATIENT
Start: 2021-10-21 | End: 2021-10-28 | Stop reason: HOSPADM

## 2021-10-20 RX ORDER — BISACODYL 10 MG
10 SUPPOSITORY, RECTAL RECTAL DAILY PRN
Status: DISCONTINUED | OUTPATIENT
Start: 2021-10-20 | End: 2021-10-28 | Stop reason: HOSPADM

## 2021-10-20 RX ORDER — VANCOMYCIN HYDROCHLORIDE 1 G/20ML
INJECTION, POWDER, LYOPHILIZED, FOR SOLUTION INTRAVENOUS PRN
Status: DISCONTINUED | OUTPATIENT
Start: 2021-10-20 | End: 2021-10-20 | Stop reason: HOSPADM

## 2021-10-20 RX ORDER — CEFAZOLIN SODIUM 1 G/3ML
1 INJECTION, POWDER, FOR SOLUTION INTRAMUSCULAR; INTRAVENOUS EVERY 8 HOURS
Status: COMPLETED | OUTPATIENT
Start: 2021-10-21 | End: 2021-10-21

## 2021-10-20 RX ORDER — MAGNESIUM SULFATE HEPTAHYDRATE 40 MG/ML
2 INJECTION, SOLUTION INTRAVENOUS ONCE
Status: DISCONTINUED | OUTPATIENT
Start: 2021-10-20 | End: 2021-10-20 | Stop reason: HOSPADM

## 2021-10-20 RX ORDER — HYDRALAZINE HYDROCHLORIDE 20 MG/ML
5 INJECTION INTRAMUSCULAR; INTRAVENOUS ONCE
Status: COMPLETED | OUTPATIENT
Start: 2021-10-20 | End: 2021-10-20

## 2021-10-20 RX ORDER — PROPOFOL 10 MG/ML
INJECTION, EMULSION INTRAVENOUS PRN
Status: DISCONTINUED | OUTPATIENT
Start: 2021-10-20 | End: 2021-10-20

## 2021-10-20 RX ORDER — ONDANSETRON 4 MG/1
4 TABLET, ORALLY DISINTEGRATING ORAL EVERY 6 HOURS PRN
Status: DISCONTINUED | OUTPATIENT
Start: 2021-10-20 | End: 2021-10-28 | Stop reason: HOSPADM

## 2021-10-20 RX ADMIN — LEVOTHYROXINE SODIUM 75 MCG: 0.03 TABLET ORAL at 08:27

## 2021-10-20 RX ADMIN — SODIUM CHLORIDE, POTASSIUM CHLORIDE, SODIUM LACTATE AND CALCIUM CHLORIDE: 600; 310; 30; 20 INJECTION, SOLUTION INTRAVENOUS at 17:58

## 2021-10-20 RX ADMIN — DOCUSATE SODIUM 50 MG AND SENNOSIDES 8.6 MG 1 TABLET: 8.6; 5 TABLET, FILM COATED ORAL at 21:09

## 2021-10-20 RX ADMIN — PHENYLEPHRINE HYDROCHLORIDE 50 MCG: 10 INJECTION INTRAVENOUS at 17:15

## 2021-10-20 RX ADMIN — ASPIRIN 81 MG: 81 TABLET, COATED ORAL at 21:09

## 2021-10-20 RX ADMIN — PROCHLORPERAZINE EDISYLATE 5 MG: 5 INJECTION INTRAMUSCULAR; INTRAVENOUS at 11:42

## 2021-10-20 RX ADMIN — HYDRALAZINE HYDROCHLORIDE 5 MG: 20 INJECTION INTRAMUSCULAR; INTRAVENOUS at 09:22

## 2021-10-20 RX ADMIN — SODIUM CHLORIDE, POTASSIUM CHLORIDE, SODIUM LACTATE AND CALCIUM CHLORIDE 50 ML/HR: 600; 310; 30; 20 INJECTION, SOLUTION INTRAVENOUS at 19:21

## 2021-10-20 RX ADMIN — KETAMINE HYDROCHLORIDE 20 MG: 50 INJECTION, SOLUTION INTRAMUSCULAR; INTRAVENOUS at 16:29

## 2021-10-20 RX ADMIN — ACETAMINOPHEN 975 MG: 325 TABLET ORAL at 21:09

## 2021-10-20 RX ADMIN — OXYCODONE HYDROCHLORIDE 2.5 MG: 5 TABLET ORAL at 04:06

## 2021-10-20 RX ADMIN — FENTANYL CITRATE 50 MCG: 50 INJECTION, SOLUTION INTRAMUSCULAR; INTRAVENOUS at 16:29

## 2021-10-20 RX ADMIN — SODIUM CHLORIDE, POTASSIUM CHLORIDE, SODIUM LACTATE AND CALCIUM CHLORIDE: 600; 310; 30; 20 INJECTION, SOLUTION INTRAVENOUS at 16:11

## 2021-10-20 RX ADMIN — POLYPROPYLENE GLYCOL 400, PROPYLENE GLYCOL 1 DROP: .4; .3 LIQUID OPHTHALMIC at 08:32

## 2021-10-20 RX ADMIN — PRAMIPEXOLE DIHYDROCHLORIDE 0.25 MG: 0.25 TABLET ORAL at 21:09

## 2021-10-20 RX ADMIN — ONDANSETRON 4 MG: 2 INJECTION INTRAMUSCULAR; INTRAVENOUS at 17:23

## 2021-10-20 RX ADMIN — FENTANYL CITRATE 25 MCG: 50 INJECTION INTRAMUSCULAR; INTRAVENOUS at 14:57

## 2021-10-20 RX ADMIN — ROSUVASTATIN CALCIUM 10 MG: 10 TABLET, FILM COATED ORAL at 21:11

## 2021-10-20 RX ADMIN — PROPOFOL 40 MG: 10 INJECTION, EMULSION INTRAVENOUS at 16:29

## 2021-10-20 RX ADMIN — SUGAMMADEX 150 MG: 100 INJECTION, SOLUTION INTRAVENOUS at 17:53

## 2021-10-20 RX ADMIN — CALCIUM CARBONATE (ANTACID) CHEW TAB 500 MG 500 MG: 500 CHEW TAB at 00:19

## 2021-10-20 RX ADMIN — ROCURONIUM BROMIDE 40 MG: 50 INJECTION, SOLUTION INTRAVENOUS at 16:29

## 2021-10-20 RX ADMIN — PHENYLEPHRINE HYDROCHLORIDE 0.4 MCG/KG/MIN: 10 INJECTION INTRAVENOUS at 16:57

## 2021-10-20 RX ADMIN — HYDRALAZINE HYDROCHLORIDE 5 MG: 20 INJECTION INTRAMUSCULAR; INTRAVENOUS at 11:21

## 2021-10-20 RX ADMIN — LABETALOL HYDROCHLORIDE 5 MG: 5 INJECTION, SOLUTION INTRAVENOUS at 18:28

## 2021-10-20 RX ADMIN — ROSUVASTATIN CALCIUM 10 MG: 10 TABLET, FILM COATED ORAL at 00:19

## 2021-10-20 RX ADMIN — METOPROLOL TARTRATE 5 MG: 5 INJECTION INTRAVENOUS at 13:47

## 2021-10-20 RX ADMIN — DEXTROSE AND SODIUM CHLORIDE: 5; 900 INJECTION, SOLUTION INTRAVENOUS at 00:17

## 2021-10-20 RX ADMIN — DEXAMETHASONE SODIUM PHOSPHATE 4 MG: 4 INJECTION, SOLUTION INTRA-ARTICULAR; INTRALESIONAL; INTRAMUSCULAR; INTRAVENOUS; SOFT TISSUE at 17:23

## 2021-10-20 RX ADMIN — PRAMIPEXOLE DIHYDROCHLORIDE 0.25 MG: 0.25 TABLET ORAL at 00:19

## 2021-10-20 RX ADMIN — PANTOPRAZOLE SODIUM 40 MG: 20 TABLET, DELAYED RELEASE ORAL at 06:32

## 2021-10-20 RX ADMIN — CEFAZOLIN SODIUM 2 G: 2 INJECTION, SOLUTION INTRAVENOUS at 16:43

## 2021-10-20 RX ADMIN — HYDROMORPHONE HYDROCHLORIDE 0.2 MG: 0.2 INJECTION, SOLUTION INTRAMUSCULAR; INTRAVENOUS; SUBCUTANEOUS at 11:53

## 2021-10-20 RX ADMIN — ESCITALOPRAM OXALATE 20 MG: 20 TABLET ORAL at 08:27

## 2021-10-20 RX ADMIN — LIDOCAINE HYDROCHLORIDE 40 MG: 20 INJECTION, SOLUTION INFILTRATION; PERINEURAL at 16:29

## 2021-10-20 RX ADMIN — LOSARTAN POTASSIUM 100 MG: 25 TABLET, FILM COATED ORAL at 08:27

## 2021-10-20 RX ADMIN — POLYPROPYLENE GLYCOL 400, PROPYLENE GLYCOL 1 DROP: .4; .3 LIQUID OPHTHALMIC at 21:10

## 2021-10-20 RX ADMIN — POLYPROPYLENE GLYCOL 400, PROPYLENE GLYCOL 1 DROP: .4; .3 LIQUID OPHTHALMIC at 00:18

## 2021-10-20 ASSESSMENT — ACTIVITIES OF DAILY LIVING (ADL)
ADLS_ACUITY_SCORE: 13
ADLS_ACUITY_SCORE: 10
ADLS_ACUITY_SCORE: 10
ADLS_ACUITY_SCORE: 11
ADLS_ACUITY_SCORE: 13
ADLS_ACUITY_SCORE: 13
ADLS_ACUITY_SCORE: 11
ADLS_ACUITY_SCORE: 13
ADLS_ACUITY_SCORE: 10
ADLS_ACUITY_SCORE: 13
ADLS_ACUITY_SCORE: 11
ADLS_ACUITY_SCORE: 13

## 2021-10-20 ASSESSMENT — ENCOUNTER SYMPTOMS: DYSRHYTHMIAS: 1

## 2021-10-20 NOTE — OR NURSING
P4 RN called to update.  Son is now in room with patient, and it sounds like she is agreeing to cardiac echo.  [place] PA also within conversation.  If cardiac echo done and clearance received, the surgery can possibly be rescheduled for later in the day.

## 2021-10-20 NOTE — PLAN OF CARE
Orthopedic surgery  Called by ED re: Sangita, who has a right hip fracture. Plan for operative intervention tomorrow at around 12:30PM. NPO at midnight. Clearance per medicine. Full consult to follow.    Sangita also has a nondisplaced right proximal humerus fracture. This will be treated nonoperatively. benny SANTA.    Reed Cordero MD   [facility]  10/19/2021

## 2021-10-20 NOTE — ANESTHESIA PROCEDURE NOTES
Airway       Patient location during procedure: OR       Procedure Start/Stop Times: 10/20/2021 4:34 PM  Staff -        Anesthesiologist:  Marcos Welsh MD       Resident/Fellow: Derek Lozano       CRNA: Tiffany Hernandez APRN CRNA       Performed By: SRNA  Consent for Airway        Urgency: elective  Indications and Patient Condition       Indications for airway management: eugene-procedural       Induction type:intravenous       Mask difficulty assessment: 1 - vent by mask    Final Airway Details       Final airway type: endotracheal airway       Successful airway: ETT - single  Endotracheal Airway Details        ETT size (mm): 7.0       Cuffed: yes       Successful intubation technique: direct laryngoscopy       DL Blade Type: Shepherd 2       Grade View of Cords: 1       Adjucts: stylet and tooth guard       Position: Right       Measured from: gums/teeth       Secured at (cm): 20       Bite block used: None    Post intubation assessment        Placement verified by: capnometry, equal breath sounds and chest rise        Number of attempts at approach: 1       Secured with: silk tape       Ease of procedure: easy       Dentition: Intact and Unchanged

## 2021-10-20 NOTE — H&P
ADMISSION HISTORY & PHYSICAL        Patient YOB: 1928  Admit date: 10/19/2021  Date of Service: 10/20/2021    ASSESSMENT AND PLAN:  89 year old female presenting with:    Nondisplaced right intertrochanteric fracture  -Ortho consulted, operative intervention tomorrow at around 12:30 PM per orthopedics notes  -N.p.o. after midnight  -Pain control    Non- displaced right proximal humerus fracture  -Per orthopedics this should be treated nonoperatively, nonweightbearing right upper extremity and forearm sling    CKD 3  -Monitor BMP    Chronic anemia  -Hemoglobin at baseline  -Monitor serially    Hypothyroidism  -Continue home levothyroxine      Paroxysmal SVT/AVNRT   AS  -Continue home Metorpolol, neeeds cards eval prior to surgery    CODE STATUS:    Disposition:  -Anticipated Length of Stay in midnights and medical necessity (including a midnight in the Emergency Department after triage if applicable): >2days      CHIEF COMPLAINT:  Fall     HISTORY OF PRESENTING ILLNESS:  A 89-year-old female past medical history of lumbar compression fracture, CAD, hypertension, hyperlipidemia presents to the ED after a fall and found to have nondisplaced intertrochanteric fracture of right femur and closed fracture of proximal end of right humerus.   She was getting into an elevator when the door began to close on her causing her to fall and landed on her right.  Given able to walk afterwards due to right hip pain and right shoulder pain.  Work-up showed fracture of right humerus and nondisplaced intertrochanteric fracture of right femur.  Admitted for intraoperative repair.      PMH/PSH:  Patient Active Problem List   Diagnosis     Coronary artery disease without angina pectoris     Essential hypertension     Hyperlipemia     Palpitation     Hyponatremia     Wide QRS ventricular tachycardia (H)     JEFFREY (acute kidney injury) (H)     Lumbar compression fracture, closed, initial encounter (H)     Paroxysmal SVT  (supraventricular tachycardia) (H)     Acute chest pain     Closed nondisplaced intertrochanteric fracture of right femur, initial encounter (H)     Closed fracture of proximal end of right humerus, unspecified fracture morphology, initial encounter       No past medical history on file.     Past Surgical History:   Procedure Laterality Date     APPENDECTOMY       CATARACT EXTRACTION       HYSTERECTOMY       MASTECTOMY Bilateral      THYROIDECTOMY            ALLERGIES:  Allergies   Allergen Reactions     Sulfa (Sulfonamide Antibiotics) [Sulfa Drugs] Rash     Vioxx [Rofecoxib] Swelling     Augmentin Nausea     Demerol [Meperidine] Nausea     Minocin [Minocycline] Other (See Comments)     lightheaded       MEDICATIONS:  Reviewed.  Current Facility-Administered Medications   Medication     acetaminophen (TYLENOL) tablet 650 mg     calcium carbonate (TUMS) chewable tablet 500 mg     dextrose 5% and 0.9% NaCl infusion     escitalopram (LEXAPRO) tablet 20 mg     HYDROmorphone (DILAUDID) injection 0.2 mg     levothyroxine (SYNTHROID/LEVOTHROID) tablet 75 mcg     lidocaine (LMX4) cream     lidocaine 1 % 0.1-1 mL     losartan (COZAAR) tablet 100 mg     melatonin tablet 1 mg     naloxone (NARCAN) injection 0.2 mg    Or     naloxone (NARCAN) injection 0.4 mg    Or     naloxone (NARCAN) injection 0.2 mg    Or     naloxone (NARCAN) injection 0.4 mg     NIFEdipine ER (ADALAT CC) 24 hr tablet 90 mg     nitroGLYcerin (NITROSTAT) sublingual tablet 0.4 mg     oxyCODONE IR (ROXICODONE) half-tab 2.5 mg     pantoprazole (PROTONIX) EC tablet 40 mg     polyethylene glycol-propylene glycol (SYSTANE ULTRA) ophthalmic solution 1 drop     pramipexole (MIRAPEX) tablet 0.25 mg     pramipexole (MIRAPEX) tablet 0.25-0.5 mg     prochlorperazine (COMPAZINE) injection 5 mg    Or     prochlorperazine (COMPAZINE) tablet 5 mg    Or     prochlorperazine (COMPAZINE) suppository 12.5 mg     rosuvastatin (CRESTOR) tablet 10 mg     sodium chloride (PF) 0.9%  PF flush 3 mL     sodium chloride (PF) 0.9% PF flush 3 mL     [Held by provider] torsemide (DEMADEX) tablet 5 mg       Current Facility-Administered Medications:      acetaminophen (TYLENOL) tablet 650 mg, 650 mg, Oral, Q6H PRN, Brady Funez MD     calcium carbonate (TUMS) chewable tablet 500 mg, 500 mg, Oral, BID, Brady Funez MD, 500 mg at 10/20/21 0019     dextrose 5% and 0.9% NaCl infusion, , Intravenous, Continuous, Brady Funez MD, Last Rate: 75 mL/hr at 10/20/21 0017, New Bag at 10/20/21 0017     escitalopram (LEXAPRO) tablet 20 mg, 20 mg, Oral, Daily, Brady Funez MD     HYDROmorphone (DILAUDID) injection 0.2 mg, 0.2 mg, Intravenous, Q4H PRN, Brady Funez MD     levothyroxine (SYNTHROID/LEVOTHROID) tablet 75 mcg, 75 mcg, Oral, Daily, Brady Funez MD     lidocaine (LMX4) cream, , Topical, Q1H PRN, Brady Funez MD     lidocaine 1 % 0.1-1 mL, 0.1-1 mL, Other, Q1H PRN, Brady Funez MD     losartan (COZAAR) tablet 100 mg, 100 mg, Oral, Daily, Brady Funez MD     melatonin tablet 1 mg, 1 mg, Oral, At Bedtime PRN, Brady Funez MD     naloxone (NARCAN) injection 0.2 mg, 0.2 mg, Intravenous, Q2 Min PRN **OR** naloxone (NARCAN) injection 0.4 mg, 0.4 mg, Intravenous, Q2 Min PRN **OR** naloxone (NARCAN) injection 0.2 mg, 0.2 mg, Intramuscular, Q2 Min PRN **OR** naloxone (NARCAN) injection 0.4 mg, 0.4 mg, Intramuscular, Q2 Min PRN, Brady Funez MD     NIFEdipine ER (ADALAT CC) 24 hr tablet 90 mg, 90 mg, Oral, Daily, Brady Funez MD     nitroGLYcerin (NITROSTAT) sublingual tablet 0.4 mg, 0.4 mg, Sublingual, Q5 Min PRN, Brady Funez MD     oxyCODONE IR (ROXICODONE) half-tab 2.5 mg, 2.5 mg, Oral, Q4H PRN, Brady Funez MD, 2.5 mg at 10/19/21 0751     pantoprazole (PROTONIX) EC tablet 40 mg, 40 mg, Oral, BID AC, Brady Funez MD     polyethylene glycol-propylene glycol (SYSTANE ULTRA) ophthalmic solution 1 drop, 1 drop, Both Eyes, 4x Daily, Brady Funez MD, 1 drop  at 10/20/21 0018     pramipexole (MIRAPEX) tablet 0.25 mg, 0.25 mg, Oral, At Bedtime, Brady Funez MD, 0.25 mg at 10/20/21 0019     pramipexole (MIRAPEX) tablet 0.25-0.5 mg, 0.25-0.5 mg, Oral, At Bedtime PRN, Brady Funez MD     prochlorperazine (COMPAZINE) injection 5 mg, 5 mg, Intravenous, Q6H PRN **OR** prochlorperazine (COMPAZINE) tablet 5 mg, 5 mg, Oral, Q6H PRN **OR** prochlorperazine (COMPAZINE) suppository 12.5 mg, 12.5 mg, Rectal, Q12H PRN, Brady Funez MD     rosuvastatin (CRESTOR) tablet 10 mg, 10 mg, Oral, At Bedtime, Brady Funez MD, 10 mg at 10/20/21 0019     sodium chloride (PF) 0.9% PF flush 3 mL, 3 mL, Intracatheter, Q8H, Brady Funez MD, 3 mL at 10/20/21 0022     sodium chloride (PF) 0.9% PF flush 3 mL, 3 mL, Intracatheter, q1 min prn, Brady Funez MD     [Held by provider] torsemide (DEMADEX) tablet 5 mg, 5 mg, Oral, Daily, Brady Funez MD   Scheduled Meds:    calcium carbonate  500 mg Oral BID     escitalopram  20 mg Oral Daily     levothyroxine  75 mcg Oral Daily     losartan  100 mg Oral Daily     NIFEdipine ER OSMOTIC  90 mg Oral Daily     pantoprazole  40 mg Oral BID AC     polyethylene glycol-propylene glycol  1 drop Both Eyes 4x Daily     pramipexole  0.25 mg Oral At Bedtime     rosuvastatin  10 mg Oral At Bedtime     sodium chloride (PF)  3 mL Intracatheter Q8H     [Held by provider] torsemide  5 mg Oral Daily     Continuous Infusions:    dextrose 5% and 0.9% NaCl 75 mL/hr at 10/20/21 0017     PRN Meds:.acetaminophen, HYDROmorphone, lidocaine 4%, lidocaine (buffered or not buffered), melatonin, naloxone **OR** naloxone **OR** naloxone **OR** naloxone, nitroGLYcerin, oxyCODONE, pramipexole, prochlorperazine **OR** prochlorperazine **OR** prochlorperazine, sodium chloride (PF)    SOCIAL HISTORY:  Social History     Socioeconomic History     Marital status:      Spouse name: Not on file     Number of children: Not on file     Years of education: Not on file  "    Highest education level: Not on file   Occupational History     Not on file   Tobacco Use     Smoking status: Never Smoker     Smokeless tobacco: Never Used   Substance and Sexual Activity     Alcohol use: No     Drug use: No     Sexual activity: Not on file   Other Topics Concern     Not on file   Social History Narrative     Not on file     Social Determinants of Health     Financial Resource Strain:      Difficulty of Paying Living Expenses:    Food Insecurity:      Worried About Running Out of Food in the Last Year:      Ran Out of Food in the Last Year:    Transportation Needs:      Lack of Transportation (Medical):      Lack of Transportation (Non-Medical):    Physical Activity:      Days of Exercise per Week:      Minutes of Exercise per Session:    Stress:      Feeling of Stress :    Social Connections:      Frequency of Communication with Friends and Family:      Frequency of Social Gatherings with Friends and Family:      Attends [employer] Services:      Active Member of Clubs or Organizations:      Attends Club or Organization Meetings:      Marital Status:    Intimate Partner Violence:      Fear of Current or Ex-Partner:      Emotionally Abused:      Physically Abused:      Sexually Abused:        FAMILY HISTORY:  Family History   Problem Relation Age of Onset     Breast Cancer Other      Heart Disease Other      Diabetes Other        ROS:  12 point review of systems reviewed and is negative except for what has already been mentioned in HPI.       PHYSICAL EXAM:  GENRL:    BP (!) 169/74   Pulse 93   Temp 98.3  F (36.8  C) (Oral)   Resp 18   Ht 1.676 m (5' 6\")   Wt 59 kg (130 lb)   SpO2 97%   BMI 20.98 kg/m    No intake/output data recorded.  No intake/output data recorded.  HEENT: NC/AT  CHEST: Clear to auscultation bilateral anteriorly, no ronchi or wheezing  HEART: S1S2 normal, regular.   ABDMN: Soft. Non-tender, non-distended.  No guarding or rigidity. Bowel sounds- active  EXTRM: Right " upper arm tenderness, externally rotated right lower extremity  INTGM: No skin rash, no cyanosis or clubbing  NEURO: Alert and oriented.  Pleasant.        DIAGNOSTIC DATA:    Recent Labs   Lab 10/19/21  1956   WBC 11.3*   HGB 10.9*   HCT 33.3*          Recent Labs   Lab 10/19/21  1956      CO2 21*   BUN 25       No results for input(s): INR in the last 168 hours.    Recent Labs   Lab 10/19/21  1956      CO2 21*   BUN 25       [unfilled]    CT Hip Right w/o Contrast    Result Date: 10/20/2021  EXAM: CT HIP RIGHT W/O CONTRAST LOCATION: [facility] DATE/TIME: 10/19/2021 10:37 PM INDICATION: Fracture, hip COMPARISON: None. TECHNIQUE: Noncontrast. Axial, sagittal and coronal thin-section reconstruction. Dose reduction techniques were used. FINDINGS: -Displaced and comminuted intertrochanteric fracture of the proximal right femur with varus angulation. There is mild medial displacement of the lesser trochanter with impaction at the fracture site. Bones are demineralized.     XR Pelvis and Hip Right 2 Views    Result Date: 10/19/2021  EXAM: XR PELVIS AND HIP RIGHT 2 VIEWS LOCATION: [facility] DATE/TIME: 10/19/2021 8:30 PM INDICATION: hip pain COMPARISON: None.     IMPRESSION: Acute fracture of the proximal right femur involving the intertrochanteric region and the base of the femoral neck. There is varus and apex anterior angulation. Underlying diffuse bony demineralization. Degenerative change in the lumbar spine.    XR Shoulder Right 3 Views    Result Date: 10/19/2021  EXAM: XR SHOULDER RIGHT G/E 3 VIEWS LOCATION: [facility] DATE/TIME: 10/19/2021 8:30 PM INDICATION: shoulder pain COMPARISON: None.     IMPRESSION: Mildly impacted fracture of the humeral neck. No dislocation. Diffuse bony demineralization. Degenerative changes in the visualized spine.     CT Cervical Spine w/o Contrast    Result Date: 10/19/2021  EXAM: CT  CERVICAL SPINE W/O CONTRAST LOCATION: [facility] DATE/TIME: 10/19/2021 8:15 PM INDICATION: Trauma injury, midline neck pain COMPARISON: 1/1/2020 TECHNIQUE: Routine CT Cervical Spine without IV contrast. Multiplanar reformats. Dose reduction techniques were used. FINDINGS: No evidence of fracture or prevertebral soft tissue swelling. Multilevel degenerative change with variable degrees of uncovertebral joint and facet hypertrophic change resulting in bilateral foraminal stenosis at C3-4, C4-5, C5-C6, C6-C7, C7-T1.     IMPRESSION: 1.  Multilevel degenerative change throughout the cervical spine with no evidence of fracture.    CT Head w/o Contrast    Result Date: 10/19/2021  EXAM: CT HEAD W/O CONTRAST LOCATION: [facility] DATE/TIME: 10/19/2021 8:15 PM INDICATION: Trauma, injury, head pain. COMPARISON: 1/1/2020 TECHNIQUE: Routine CT Head without IV contrast. Multiplanar reformats. Dose reduction techniques were used. FINDINGS: INTRACRANIAL CONTENTS: No intracranial hemorrhage, extraaxial collection, or mass effect.  No CT evidence of acute infarct. Moderate presumed chronic small vessel ischemic changes. Mild generalized volume loss. No hydrocephalus. Posterior fossa structures including cerebellar hemispheres, fourth ventricle and CP angle cisterns are clear. Nasopharynx is clear. VISUALIZED ORBITS/SINUSES/MASTOIDS: Prior bilateral cataract surgery. Visualized portions of the orbits are otherwise unremarkable. No paranasal sinus mucosal disease. No middle ear or mastoid effusion. BONES/SOFT TISSUES: No acute abnormality.     IMPRESSION: 1.  No CT evidence for acute intracranial process. 2.  Brain atrophy and presumed chronic microvascular ischemic changes as above.      Patient's new lab studies reviewed personally.  Patient's new radiology reports reviewed personally.  I personally viewed and personally interpreted patient's EKG:     Note created using dragon voice  recognition software.  Errors in spelling or words which seems out of context are unintentional.  Sounds alike errors may have escaped editing.     10/20/2021    Brady Funez MD  [facility]

## 2021-10-20 NOTE — PLAN OF CARE
Problem: Adult Inpatient Plan of Care  Goal: Plan of Care Review  Outcome: Improving  Goal: Patient-Specific Goal (Individualized)  Outcome: Improving  Goal: Absence of Hospital-Acquired Illness or Injury  Outcome: Improving  Intervention: Identify and Manage Fall Risk  Recent Flowsheet Documentation  Taken 10/20/2021 0406 by Misti Wheeler RN  Safety Promotion/Fall Prevention: bed alarm on  Taken 10/20/2021 0019 by Misti Wheeler RN  Safety Promotion/Fall Prevention: bed alarm on  Intervention: Prevent Skin Injury  Recent Flowsheet Documentation  Taken 10/20/2021 0406 by Misti Wheeler RN  Body Position: supine  Taken 10/20/2021 0350 by Misti Wheeler RN  Body Position:   turned   left   heels elevated  Taken 10/20/2021 0019 by Misti Wheeler RN  Body Position: supine  Goal: Optimal Comfort and Wellbeing  Outcome: Improving  Goal: Readiness for Transition of Care  Outcome: Improving     Problem: Risk for Delirium  Goal: Optimal Coping  Outcome: Improving  Goal: Improved Behavioral Control  Outcome: Improving  Goal: Improved Attention and Thought Clarity  Outcome: Improving  Goal: Improved Sleep  Outcome: Improving   Pt with some confusion / forgetfulness. Pain controlled with prn oxycodone and ice packs. Pt npo since midnight for surgery today. Will continue to monitor.

## 2021-10-20 NOTE — ANESTHESIA CARE TRANSFER NOTE
Patient: Johanna Washburn    Procedure: Procedure(s):  INTERNAL FIXATION, FRACTURE, TROCHANTERIC, HIP, USING IM NAIL, USING HANA TABLE       Diagnosis: Closed nondisplaced intertrochanteric fracture of right femur, initial encounter (H) [S72.144A]  Diagnosis Additional Information: No value filed.    Anesthesia Type:   General     Note:    Oropharynx: oropharynx clear of all foreign objects and spontaneously breathing  Level of Consciousness: drowsy  Oxygen Supplementation: face mask  Level of Supplemental Oxygen (L/min / FiO2): 6  Independent Airway: airway patency satisfactory and stable  Dentition: dentition unchanged  Vital Signs Stable: post-procedure vital signs reviewed and stable  Report to RN Given: handoff report given  Patient transferred to: PACU    Handoff Report: Identifed the Patient, Identified the Reponsible Provider, Reviewed the pertinent medical history, Discussed the surgical course, Reviewed Intra-OP anesthesia mangement and issues during anesthesia, Set expectations for post-procedure period and Allowed opportunity for questions and acknowledgement of understanding      Vitals:  Vitals Value Taken Time   /83 10/20/21 1805   Temp 98.9 F 10/20/21  1805   Pulse 91 10/20/21 1810   Resp 23 10/20/21 1810   SpO2 95 % 10/20/21 1810   Vitals shown include unvalidated device data.    Electronically Signed By: KEVIN Bahena CRNA  October 20, 2021  6:11 PM

## 2021-10-20 NOTE — PROGRESS NOTES
Pt is alert but confused intermittently. Pain to right humerus and right femur at rest. Per son, she does have some base line confusion. Bps 188/86. Called MD for hydrolazine order. Gave 5m IV hydrolazine.  systolic called MD. 2nd dose 5mg IV hydralazine given. Pt became nauseous after 2nd dose of hydralazine. Given IV compazine. Nausea subsided. ECHO complete. Gave PRN IV dilaudid as Pt c/o pain and Bps still elevated. Bps remained elevated 188/68. MD contacted and ordered tele monitoring and x1 IV metoprolol 5mg. Gave IV Metoprolol at 1350. Pt transported to [facility] at 1355. Report given to Ghada Patel and will continue to monitor VS.     Tele rhythm: sinus tachycardia with PAC and PVC. Clarified with monitor tech P3.    Right arm pain, mild edema. Elevated on pillow. Got order for sling. Ice packs applied. CMS+. Right leg pain. CMS +.     Son was at bedside and went down to [facility] with Pt.

## 2021-10-20 NOTE — ED NOTES
Bed: JNED-21  Expected date: 10/19/21  Expected time: 7:07 PM  Means of arrival: Ambulance  Comments:  [facility]- tripped. Hip pain.

## 2021-10-20 NOTE — ED TRIAGE NOTES
Brought in by  Katia Beth. Pt fell at her assisted living after tripping and falling on R side of her body.  Pt c/o of R hip, R shoulder and R thigh pain. No LOC;Denied hitting her head. Not on blood thinner. Denies neck pain.

## 2021-10-20 NOTE — PROGRESS NOTES
Echocardiogram results reviewed:  The left ventricle is normal in size.  The visual ejection fraction is 60-65%.  Normal left ventricular wall motion  The right ventricle is normal in size and function.  Moderate aortic valve calcification is present.  There is moderate aortic stenosis (aortic valve area = .75 - 1.0cm2).    Given absence of severe aortic stenosis, continued maintained normal left ventricular systolic function, and absence of angina complaints or evidence of arrhythmias, this suggests that surgery could be pursued with elevated cardiac risk commensurate with her age.  Please call if we can be of further assistance.  Patel Laguna MD

## 2021-10-20 NOTE — PHARMACY-ADMISSION MEDICATION HISTORY
Pharmacy Note - Admission Medication History    Pertinent Provider Information: N/A     ______________________________________________________________________    Prior To Admission (PTA) med list completed and updated in EMR.       PTA Med List   Medication Sig Last Dose     acetaminophen (TYLENOL) 325 MG tablet Take 650 mg by mouth 3 times daily 10/19/2021 at 1300 x2     aspirin 81 mg chewable tablet [ASPIRIN 81 MG CHEWABLE TABLET] Chew 81 mg daily. 10/19/2021 at AM     benzonatate (TESSALON) 100 MG capsule Take 100 mg by mouth 3 times daily as needed for cough  Unknown at Unknown time     calcium carbonate (TUMS) 500 MG chewable tablet Take 1 chew tab by mouth nightly as needed for heartburn Unknown at Unknown time     calcium, as carbonate, (TUMS) 200 mg calcium (500 mg) chewable tablet Take 1 tablet by mouth 2 times daily 9AM, 4PM 10/19/2021 at 1700 x2     cephALEXin (KEFLEX) 250 MG capsule Take 250 mg by mouth daily For UTI prevention. 10/19/2021 at AM     diclofenac (VOLTAREN) 1 % topical gel Apply 2 g topically 2 times daily as needed for moderate pain Unknown at Unknown time     diclofenac sodium (VOLTAREN) 1 % Gel Apply 2 g topically daily  10/19/2021 at AM     ergocalciferol (VITAMIN D2) 50,000 unit capsule Take 50,000 Units by mouth once a week Thursdays 10/14/2021     escitalopram (LEXAPRO) 20 MG tablet Take 20 mg by mouth daily 10/19/2021 at AM     guaiFENesin (ROBITUSSIN) 100 mg/5 mL syrup Take 200 mg by mouth every 6 hours as needed for cough  Unknown at Unknown time     levothyroxine (SYNTHROID, LEVOTHROID) 75 MCG tablet [LEVOTHYROXINE (SYNTHROID, LEVOTHROID) 75 MCG TABLET] Take 75 mcg by mouth daily. 10/19/2021 at AM     lidocaine (LIDODERM) 5 % patch Place 1 patch onto the skin daily as needed for moderate pain (rib pain) To prevent lidocaine toxicity, patient should be patch free for 12 hrs daily. 9/28/2021     losartan (COZAAR) 100 MG tablet Take 100 mg by mouth daily 10/19/2021 at AM      NIFEdipine (PROCARDIA XL) 90 MG 24 hr tablet [NIFEDIPINE (PROCARDIA XL) 90 MG 24 HR TABLET] Take 90 mg by mouth daily. 10/19/2021 at AM     nitroglycerin (NITROSTAT) 0.4 MG SL tablet [NITROGLYCERIN (NITROSTAT) 0.4 MG SL TABLET] Place 0.4 mg under the tongue every 5 (five) minutes as needed for chest pain. Unknown at Unknown time     omeprazole (PRILOSEC) 20 MG capsule [OMEPRAZOLE (PRILOSEC) 20 MG CAPSULE] Take 20 mg by mouth 2 (two) times a day before meals.  10/19/2021 at 1600 x2     peg 400-propylene glycol (SYSTANE) 0.4-0.3 % Drop [-PROPYLENE GLYCOL (SYSTANE) 0.4-0.3 % DROP] Administer 1 drop to both eyes 4 (four) times a day. 10/19/2021 at 1300 x2     pramipexole (MIRAPEX) 0.25 MG tablet Take 0.25-0.5 mg by mouth nightly as needed Unknown at Unknown time     pramipexole (MIRAPEX) 0.25 MG tablet Take 0.25 mg by mouth At Bedtime  10/18/2021 at PM     rosuvastatin (CRESTOR) 10 MG tablet [ROSUVASTATIN (CRESTOR) 10 MG TABLET] Take 10 mg by mouth at bedtime. 10/18/2021 at PM     senna-docusate (PERICOLACE) 8.6-50 mg tablet [SENNA-DOCUSATE (PERICOLACE) 8.6-50 MG TABLET] Take 1 tablet by mouth 2 (two) times a day. Do not administer if loose stools. More than a month at Unknown time     torsemide (DEMADEX) 5 MG tablet [TORSEMIDE (DEMADEX) 5 MG TABLET] Take 5 mg by mouth daily.  10/19/2021 at AM     triamcinolone (KENALOG) 0.1 % cream Apply 1 Application topically 3 times daily as needed (rash/itching/dermatitis)  Unknown at Unknown time       Information source(s): Facility ([facility]/) medication list/MAR  Method of interview communication: N/A    Summary of Changes to PTA Med List  New: Keflex  Discontinued: iron, trimethoprim, cipro, azithromycin  Changed: Tums freq    Patient was asked about OTC/herbal products specifically.  PTA med list reflects this.    In the past week, patient estimated taking medication this percent of the time:  greater than 90%.    Allergies were reviewed, assessed, and updated with the  patient.      Patient did not bring any medications to the hospital and can't retrieve from home. No multi-dose medications are available for use during hospital stay.     The information provided in this note is only as accurate as the sources available at the time of the update(s).    Thank you for the opportunity to participate in the care of this patient.    Fran Portillo [facility]  10/19/2021 10:14 PM

## 2021-10-20 NOTE — PLAN OF CARE
It sounds as though Piedad will not be cleared for surgery for 1 PM today.  We would potentially have availability a bit later this afternoon, around 330 or so, if she would be cleared by that point in time.  Please keep n.p.o. for now.  We will touch base with the medicine and cardiology teams and coordinate with the operating room.    Reed Cordero MD   [facility]  10/20/2021

## 2021-10-20 NOTE — ANESTHESIA PROCEDURE NOTES
Arterial Line Procedure Note    Pre-Procedure   Staff -        Anesthesiologist:  Marcos Welsh MD       Performed By: anesthesiologist       Location: pre-op       Procedure Start/Stop Times: 10/20/2021 2:50 PM and 10/20/2021 3:05 PM       Pre-Anesthestic Checklist: patient identified, IV checked, risks and benefits discussed, informed consent, monitors and equipment checked, pre-op evaluation and at physician/surgeon's request  Timeout:       Correct Patient: Yes        Correct Procedure: Yes        Correct Site: Yes        Correct Position: Yes   Procedure   Procedure: arterial line       Laterality: left       Insertion Site: radial.  Sterile Prep        Standard elements of sterile barrier followed       Skin prep: Chloraprep  Insertion/Injection        Technique: ultrasound guided        1. Ultrasound was used to evaluate the access site.       2. Artery evaluated via ultrasound for patency/adequacy.       3. Using real-time ultrasound the needle/catheter was observed entering the artery/vein.       4. Permanent image was captured and entered into the patient's record.       5. The visualized structures were anatomically normal.       6. There were no apparent abnormal pathologic findings.       Catheter Type/Size: 20 G, 1.75 in/4.5 cm quick cath (integral wire)  Narrative         Secured by: suture       Tegaderm and Biopatch dressing used.       Complications: None apparent,      Arterial waveform: Yes    Comments:  3cc 1% lidocaine used for skin.    Unable to thread wire more than midway up forearm so used 4.5 cm catheter for arterial line catheter.

## 2021-10-20 NOTE — PROGRESS NOTES
Care Management Initial Consult    General Information  Assessment completed with: Patient, Family,  (Chad)       Primary Care Provider verified and updated as needed: Yes   Readmission within the last 30 days: no previous admission in last 30 days         Advance Care Planning:     Unable to assess       Communication Assessment  Patient's communication style: spoken language (English or Bilingual)    Hearing Difficulty or Deaf: no   Wear Glasses or Blind: no    Cognitive  Cognitive/Neuro/Behavioral: .WDL except, orientation  Level of Consciousness: confused, alert  Arousal Level: opens eyes spontaneously  Orientation: place, situation             Living Environment:   People in home: alone     Current living Arrangements: assisted living      Able to return to prior arrangements: yes       Family/Social Support:  Care provided by: homecare agency  Provides care for: no one, unable/limited ability to care for self  Marital Status:   Children          Description of Support System: Supportive, Involved         Current Resources:   Patient receiving home care services:       Community Resources:    Equipment currently used at home: walker, rolling  Supplies currently used at home:      Employment/Financial:  Employment Status: retired        Financial Concerns: No concerns identified   Referral to Financial Counselor: No       Lifestyle & Psychosocial Needs:  Social Determinants of Health     Tobacco Use: Low Risk      Smoking Tobacco Use: Never Smoker     Smokeless Tobacco Use: Never Used   Alcohol Use:      Frequency of Alcohol Consumption:      Average Number of Drinks:      Frequency of Binge Drinking:    Financial Resource Strain:      Difficulty of Paying Living Expenses:    Food Insecurity:      Worried About Running Out of Food in the Last Year:      Ran Out of Food in the Last Year:    Transportation Needs:      Lack of Transportation (Medical):      Lack of Transportation (Non-Medical):    Physical  Activity:      Days of Exercise per Week:      Minutes of Exercise per Session:    Stress:      Feeling of Stress :    Social Connections:      Frequency of Communication with Friends and Family:      Frequency of Social Gatherings with Friends and Family:      Attends Restorationism Services:      Active Member of Clubs or Organizations:      Attends Club or Organization Meetings:      Marital Status:    Intimate Partner Violence:      Fear of Current or Ex-Partner:      Emotionally Abused:      Physically Abused:      Sexually Abused:    Depression:      PHQ-2 Score:    Housing Stability:      Unable to Pay for Housing in the Last Year:      Number of Places Lived in the Last Year:      Unstable Housing in the Last Year:        Functional Status:  Prior to admission patient needed assistance:              Mental Health Status:  Mental Health Status: No Current Concerns       Chemical Dependency Status:  Chemical Dependency Status: No Current Concerns             Values/Beliefs:  Spiritual, Cultural Beliefs, Restorationism Practices, Values that affect care:                 Additional Information:    TALYA introduced self and CM services to Pt and Pt's son who was present in room.  DINORACM completed a brief assessment with Pt and son.  Pt lives at an Assisted Living Facility (family was unable to provide name of facility) where she receives total care.  Pt uses a walker and confirms PCP.  CM following care progression.  Transportation undetermined at this time as Pt's son states he lives 150 miles away.      AL Menendez

## 2021-10-20 NOTE — OR NURSING
Cardiology has not cleared patient for surgery today.  MD of Anesthesia informed.  Called surgeon and informed him that patient does not have cardiac clearance at this time.  OR Charge also informed.

## 2021-10-20 NOTE — PLAN OF CARE
Orthopedic surgery update  Notes reviewed this AM. Sangita needs cardiology evaluation prior to surgery. Surgery is tentatively planned for around 12:30PM today if this is completed this morning, otherwise will delay to later this evening. Will continue to follow closely. Please keep NPO.    Reed Cordero MD   [facility]  10/20/2021

## 2021-10-20 NOTE — ED PROVIDER NOTES
EMERGENCY DEPARTMENT ENCOUnter      NAME: Johanna Washburn  AGE: 89 year old female  YOB: 1928  MRN: [id number]  EVALUATION DATE & TIME: 10/19/2021  7:26 PM    PCP: Reed Shore    ED PROVIDER: Antonio Panda DO      Chief Complaint   Patient presents with     Fall         FINAL IMPRESSION:  1. Closed fracture of proximal end of right humerus, unspecified fracture morphology, initial encounter    2. Closed nondisplaced intertrochanteric fracture of right femur, initial encounter (H)          ED COURSE & MEDICAL DECISION MAKIN:21 PM Met with patient for initial interview and exam in room 21. Discussed initial plan for care for their stay in the emergency department.  9:27 PM Spoke with , [facility].   9:31 PM Spoke with , hospitalist.     The patient presented to the emergency department today after a fall.  She was found to have a right sided intertrochanteric hip fracture as well as a right proximal humerus fracture.  Her findings were discussed with orthopedics and she will be kept in the hospital for likely surgical treatment tomorrow.  She is comfortable with this plan.    At the conclusion of the encounter I discussed the results of all of the tests and the disposition. The questions were answered. The patient or family acknowledged understanding and was agreeable with the care plan.          =================================================================    HPI        Johanna Washburn is a 89 year old female with a pertinent history of lumbar compression fracture, acute kidney injury, CAD, hypertension, and hyperlipidemia who presents to this ED via EMS for evaluation of a fall.    Patient endorses feeling terrible. She notes that she was getting in to go up on an elevator when the doors began to close on her causing her to fall and land on her right side. Patient endorses soreness in her right hip and shoulder. She believes she does not hit her head  and states she remembers the incident well. She notes it hurt to move her right arm as well as her right leg. She states the whole right arm is sore and the whole right leg is delicate. She denies any abdominal pain as well as pain in her left side. Patient denies any other complaints at the time.       REVIEW OF SYSTEMS     Constitutional:  Denies fever or chills  HENT:  Denies sore throat   Respiratory:  Denies cough or shortness of breath   Cardiovascular:  Denies chest pain or palpitations  GI:  Denies abdominal pain, nausea, or vomiting  Musculoskeletal:  Endorses right sided arm and leg pain  Skin:  Denies rash   Neurologic:  Denies headache, focal weakness or sensory changes    All other systems reviewed and are negative      PAST MEDICAL HISTORY:  No past medical history on file.    PAST SURGICAL HISTORY:  Past Surgical History:   Procedure Laterality Date     APPENDECTOMY       CATARACT EXTRACTION       HYSTERECTOMY       MASTECTOMY Bilateral      THYROIDECTOMY             CURRENT MEDICATIONS:    No current outpatient medications on file.      ALLERGIES:  Allergies   Allergen Reactions     Sulfa (Sulfonamide Antibiotics) [Sulfa Drugs] Rash     Vioxx [Rofecoxib] Swelling     Augmentin Nausea     Demerol [Meperidine] Nausea     Minocin [Minocycline] Other (See Comments)     lightheaded       FAMILY HISTORY:  Family History   Problem Relation Age of Onset     Breast Cancer Other      Heart Disease Other      Diabetes Other        SOCIAL HISTORY:   Social History     Socioeconomic History     Marital status:      Spouse name: Not on file     Number of children: Not on file     Years of education: Not on file     Highest education level: Not on file   Occupational History     Not on file   Tobacco Use     Smoking status: Never Smoker     Smokeless tobacco: Never Used   Substance and Sexual Activity     Alcohol use: No     Drug use: No     Sexual activity: Not on file   Other Topics Concern     Not on file  "  Social History Narrative     Not on file     Social Determinants of Health     Financial Resource Strain:      Difficulty of Paying Living Expenses:    Food Insecurity:      Worried About Running Out of Food in the Last Year:      Ran Out of Food in the Last Year:    Transportation Needs:      Lack of Transportation (Medical):      Lack of Transportation (Non-Medical):    Physical Activity:      Days of Exercise per Week:      Minutes of Exercise per Session:    Stress:      Feeling of Stress :    Social Connections:      Frequency of Communication with Friends and Family:      Frequency of Social Gatherings with Friends and Family:      Attends Mormonism Services:      Active Member of Clubs or Organizations:      Attends Club or Organization Meetings:      Marital Status:    Intimate Partner Violence:      Fear of Current or Ex-Partner:      Emotionally Abused:      Physically Abused:      Sexually Abused:        VITALS:  Patient Vitals for the past 24 hrs:   BP Temp Temp src Pulse Resp SpO2 Height Weight   10/19/21 2312 (!) 169/74 -- -- 93 -- -- -- --   10/19/21 2254 (!) 155/102 98.3  F (36.8  C) Oral -- 18 97 % -- --   10/19/21 2215 (!) 155/70 -- -- 96 -- 97 % -- --   10/19/21 2200 (!) 152/71 -- -- 94 -- 97 % -- --   10/19/21 2145 (!) 167/72 -- -- 91 -- 96 % -- --   10/19/21 2130 (!) 149/66 -- -- 91 -- 95 % -- --   10/19/21 2115 (!) 144/67 -- -- 88 -- 98 % -- --   10/19/21 2100 (!) 158/70 -- -- 96 29 98 % -- --   10/19/21 2000 (!) 145/64 -- -- 89 -- 96 % -- --   10/19/21 1959 -- -- -- -- -- 97 % -- --   10/19/21 1945 (!) 159/68 -- -- 88 -- 97 % -- --   10/19/21 1937 (!) 155/69 97.7  F (36.5  C) Oral 91 18 -- 1.676 m (5' 6\") 59 kg (130 lb)       PHYSICAL EXAM    Constitutional:  Well developed, Well nourished,  HENT:  Normocephalic, Atraumatic, Bilateral external ears normal, Oropharynx moist, Nose normal.   Neck:  Normal range of motion, No meningismus, No stridor.   Eyes:  EOMI, Conjunctiva normal, No " discharge.   Respiratory:  Normal breath sounds, No respiratory distress, No wheezing, No chest tenderness.   Cardiovascular:  Normal heart rate, Normal rhythm, No murmurs  GI:  Soft, No tenderness, No guarding, No CVA tenderness.   Musculoskeletal:  Neurovascularly intact distally, No edema, No cyanosis, Mild pain with range of motion to right shoulder. Pain with any movement of right hip. Right leg is held in external rotation. No obvious bony deformities.   Integument:  Warm, Dry, No erythema, No rash. No breaks in the skin.  Lymphatic:  No lymphadenopathy noted.   Neurologic:  Alert & oriented x 3, Normal motor function, Normal sensory function, No focal deficits noted.   Psychiatric:  Affect normal, Judgment normal, Mood normal.      LAB:  All pertinent labs reviewed and interpreted.  Results for orders placed or performed during the hospital encounter of 10/19/21                                               CBC with platelets   Result Value Ref Range    WBC Count 11.3 (H) 4.0 - 11.0 10e3/uL    RBC Count 3.76 (L) 3.80 - 5.20 10e6/uL    Hemoglobin 10.9 (L) 11.7 - 15.7 g/dL    Hematocrit 33.3 (L) 35.0 - 47.0 %    MCV 89 78 - 100 fL    MCH 29.0 26.5 - 33.0 pg    MCHC 32.7 31.5 - 36.5 g/dL    RDW 13.4 10.0 - 15.0 %    Platelet Count 263 150 - 450 10e3/uL   Basic metabolic panel   Result Value Ref Range    Sodium 136 136 - 145 mmol/L    Potassium 4.1 3.5 - 5.0 mmol/L    Chloride 104 98 - 107 mmol/L    Carbon Dioxide (CO2) 21 (L) 22 - 31 mmol/L    Anion Gap 11 5 - 18 mmol/L    Urea Nitrogen 25 8 - 28 mg/dL    Creatinine 1.27 (H) 0.60 - 1.10 mg/dL    Calcium 10.2 8.5 - 10.5 mg/dL    Glucose 141 (H) 70 - 125 mg/dL    GFR Estimate 36 (L) >60 mL/min/1.73m2   Asymptomatic COVID-19 Virus (Coronavirus) by PCR Nasopharyngeal    Specimen: Nasopharyngeal; Swab   Result Value Ref Range    SARS CoV2 PCR Negative Negative       RADIOLOGY:  I have independently reviewed and interpreted the above imaging, pending the final  radiology read.  CT Hip Right w/o Contrast   Final Result      XR Pelvis and Hip Right 2 Views   Final Result   IMPRESSION: Acute fracture of the proximal right femur involving the intertrochanteric region and the base of the femoral neck. There is varus and apex anterior angulation. Underlying diffuse bony demineralization. Degenerative change in the lumbar    spine.      XR Shoulder Right 3 Views   Final Result   IMPRESSION: Mildly impacted fracture of the humeral neck. No dislocation. Diffuse bony demineralization.      Degenerative changes in the visualized spine.       CT Cervical Spine w/o Contrast   Final Result   IMPRESSION:   1.  Multilevel degenerative change throughout the cervical spine with no evidence of fracture.      CT Head w/o Contrast   Final Result   IMPRESSION:   1.  No CT evidence for acute intracranial process.   2.  Brain atrophy and presumed chronic microvascular ischemic changes as above.          EKG:    Normal sinus rhythm at 80 bpm with PVCs.  No obvious signs of acute ischemia.   ms,  ms.    I have independently reviewed and interpreted this EKG          I, Liss Durham, am serving as a scribe to document services personally performed by Dr. Panda based on my observation and the provider's statements to me. I, Antonio Panda, DO attest that Liss Durham is acting in a scribe capacity, has observed my performance of the services and has documented them in accordance with my direction.    Antonio Panda DO  Emergency Medicine  [facility] EMERGENCY DEPARTMENT  [facility] [facility] [address]  [phone]  Dept: [phone]     Antonio Panda MD  10/20/21 0035

## 2021-10-20 NOTE — ANESTHESIA PREPROCEDURE EVALUATION
Anesthesia Pre-Procedure Evaluation    Patient: Johanna Washburn   MRN: [id number] : 1928        Preoperative Diagnosis: Closed nondisplaced intertrochanteric fracture of right femur, initial encounter (H) [A15.173B]    Procedure : Procedure(s):  INTERNAL FIXATION, FRACTURE, TROCHANTERIC, HIP, USING PINS OR RODS, USING HANA TABLE          No past medical history on file.   Past Surgical History:   Procedure Laterality Date     APPENDECTOMY       CATARACT EXTRACTION       HYSTERECTOMY       MASTECTOMY Bilateral      THYROIDECTOMY        Allergies   Allergen Reactions     Sulfa (Sulfonamide Antibiotics) [Sulfa Drugs] Rash     Vioxx [Rofecoxib] Swelling     Augmentin Nausea     Demerol [Meperidine] Nausea     Minocin [Minocycline] Other (See Comments)     lightheaded      Social History     Tobacco Use     Smoking status: Never Smoker     Smokeless tobacco: Never Used   Substance Use Topics     Alcohol use: No      Wt Readings from Last 1 Encounters:   10/19/21 59 kg (130 lb)        Anesthesia Evaluation            ROS/MED HX  ENT/Pulmonary:  - neg pulmonary ROS     Neurologic:     (+) delerium, resolved No. dementia,  (-) no CVA   Cardiovascular: Comment: TTE:  The left ventricle is normal in size.  The visual ejection fraction is 60-65%.  Normal left ventricular wall motion  The right ventricle is normal in size and function.  Moderate aortic valve calcification is present.  There is moderate aortic stenosis (aortic valve area = .75 - 1.0cm2).    (+) hypertension----stent-MCDONALD. dysrhythmias (history of SVT per chart), PVCs and Other, valvular problems/murmurs type: AS  : Systolic 180 in pre-op.   METS/Exercise Tolerance: 1 - Eating, dressing    Hematologic:     (+) anemia,     Musculoskeletal: Comment: Right humerus and R hip  (+) fracture, Fracture location: RLE and RUE,     GI/Hepatic:     (+) esophageal disease, Stricture,     Renal/Genitourinary:     (+) renal disease, type: CRI,     Endo:  - neg endo  ROS     Psychiatric/Substance Use:       Infectious Disease:  - neg infectious disease ROS     Malignancy:       Other:            Physical Exam    Airway        Mallampati: II   TM distance: > 3 FB   Neck ROM: full     Respiratory Devices and Support         Dental           Cardiovascular          Rhythm and rate: regular and normal   (+) murmur       Pulmonary           breath sounds clear to auscultation           OUTSIDE LABS:  CBC:   Lab Results   Component Value Date    WBC 10.5 10/20/2021    WBC 11.3 (H) 10/19/2021    HGB 9.1 (L) 10/20/2021    HGB 10.9 (L) 10/19/2021    HCT 29.1 (L) 10/20/2021    HCT 33.3 (L) 10/19/2021     10/20/2021     10/19/2021     BMP:   Lab Results   Component Value Date     10/20/2021     10/19/2021    POTASSIUM 4.0 10/20/2021    POTASSIUM 4.1 10/19/2021    CHLORIDE 106 10/20/2021    CHLORIDE 104 10/19/2021    CO2 23 10/20/2021    CO2 21 (L) 10/19/2021    BUN 22 10/20/2021    BUN 25 10/19/2021    CR 1.01 10/20/2021    CR 1.27 (H) 10/19/2021     (H) 10/20/2021     (H) 10/19/2021     COAGS:   Lab Results   Component Value Date    PTT 29 09/02/2018    INR 1.02 09/02/2018     POC: No results found for: BGM, HCG, HCGS  HEPATIC:   Lab Results   Component Value Date    ALBUMIN 3.6 10/20/2021    PROTTOTAL 6.0 10/20/2021    ALT 12 10/20/2021    AST 17 10/20/2021    ALKPHOS 90 10/20/2021    BILITOTAL 0.3 10/20/2021     OTHER:   Lab Results   Component Value Date    KIN 9.5 10/20/2021    LIPASE 54 (H) 07/31/2021    TSH 1.13 03/15/2021       Anesthesia Plan    ASA Status:  4   NPO Status:  NPO Appropriate    Anesthesia Type: General.     - Airway: ETT   Induction: Propofol.   Maintenance: Balanced.   Techniques and Equipment:     - Lines/Monitors: 2nd IV, Arterial Line     - Drips/Meds: Ketamine     Consents    Anesthesia Plan(s) and associated risks, benefits, and realistic alternatives discussed. Questions answered and patient/representative(s) expressed  understanding.     - Discussed with:  Healthcare Power of , Patient (Discussed with son Chad)      - Extended Intubation/Ventilatory Support Discussed: No.      - Patient is DNR/DNI Status: Yes             Suspend during perioperative period? No.    Use of blood products discussed: Yes.     - Discussed with: Healthcare Power of .     - Consented: consented to blood products            Reason for refusal: other.     Postoperative Care    Pain management: IV analgesics, Oral pain medications.   PONV prophylaxis: Ondansetron (or other 5HT-3), Dexamethasone or Solumedrol     Comments:    Elderly patient with dementia/delirium. Discussed that this procedure is higher risk given her comobidities (especially moderate bordering on severe aortic stenosis) for complications from anesthesia including stroke, heart attack and organ damage. Patient is DNR and son would like her to remain so during procedure. He is ok with intubation and vasoactive medications. Given her aortic stenosis I feel that spinal anesthesia is riskier than GETA so will proceed with GETA. Discussed that her delirium may worsen as a result of anesthesia. Discussed risks and benefits of pre-induction arterial line. Son states understanding and consent to plan.             Marcos Welsh MD

## 2021-10-20 NOTE — CONSULTS
ORTHOPEDIC CONSULTATION    Consultation  Johanna Washburn,  1928, MRN [id number]    [facility]   Closed nondisplaced intertrochanteric fracture of right femur, initial encounter (H) [S72.144A]  Closed fracture of proximal end of right humerus, unspecified fracture morphology, initial encounter [S42.201A]    PCP: Reed Shore, [phone]   Code status:  No Order       Extended Emergency Contact Information  Primary Emergency Contact: Awais Washburn   [facility]  Mobile Phone: [phone]  Relation: Son  Secondary Emergency Contact: Teri Rivero  Mobile Phone: [phone]  Relation: Grandchild         IMPRESSION:  Closed right intertrochanteric fracture, secondary to fall   Closed right nondisplaced fracture of proximal humerus, secondary to fall        PLAN:  This patient was discussed with Dr. Cordero, on-call surgeon for [facility] and they are in agreement with the following plan. Patient is having an echocardiogram now and if cleared by cardiology, she will ideally have surgery this afternoon. This is tentatively scheduled at 3:30 pm with Dr. Reed. Patient's son is in the room and is in agreement with plan. Right humeral neck fracture will be treated nonoperatively and supported with sling.     Thank you for including [facility] in the care of Johanna Washburn. It has been a pleasure participating in Sangita's care.        CHIEF COMPLAINT: right hip and right arm injury secondary to fall     HISTORY OF PRESENT ILLNESS:  The patient is seen in orthopedic consultation at the request of Dr. Funez.  The patient is a 89 year old female who was admitted for right intertrochanteric fracture secondary to fall yesterday evening. She was also found to have a closed fracture of proximal end right humerus. Per records, she was getting into an elevator and when the door started closing on her she fell on her right hip and right arm, causing immediate pain.     Patient  has a recent history of worsening dementia, history is unreliable at this time. She reports pain in the right hip and right arm. Does not report other symptoms but is not answering questions appropriately.     PAST MEDICAL HISTORY:  See chart   lumbar compression fracture, CAD, hypertension, hyperlipidemia      ALLERGIES:   Review of patient's allergies indicates   Allergies   Allergen Reactions     Sulfa (Sulfonamide Antibiotics) [Sulfa Drugs] Rash     Vioxx [Rofecoxib] Swelling     Augmentin Nausea     Demerol [Meperidine] Nausea     Minocin [Minocycline] Other (See Comments)     lightheaded         MEDICATIONS UPON ADMISSION:  Medications were reviewed.  They include:   Medications Prior to Admission   Medication Sig Dispense Refill Last Dose     acetaminophen (TYLENOL) 325 MG tablet Take 650 mg by mouth 3 times daily   10/19/2021 at 1300 x2     aspirin 81 mg chewable tablet [ASPIRIN 81 MG CHEWABLE TABLET] Chew 81 mg daily.   10/19/2021 at AM     benzonatate (TESSALON) 100 MG capsule Take 100 mg by mouth 3 times daily as needed for cough    Unknown at Unknown time     calcium carbonate (TUMS) 500 MG chewable tablet Take 1 chew tab by mouth nightly as needed for heartburn   Unknown at Unknown time     calcium, as carbonate, (TUMS) 200 mg calcium (500 mg) chewable tablet Take 1 tablet by mouth 2 times daily 9AM, 4PM   10/19/2021 at 1700 x2     cephALEXin (KEFLEX) 250 MG capsule Take 250 mg by mouth daily For UTI prevention.   10/19/2021 at AM     diclofenac (VOLTAREN) 1 % topical gel Apply 2 g topically 2 times daily as needed for moderate pain   Unknown at Unknown time     diclofenac sodium (VOLTAREN) 1 % Gel Apply 2 g topically daily    10/19/2021 at AM     ergocalciferol (VITAMIN D2) 50,000 unit capsule Take 50,000 Units by mouth once a week Thursdays   10/14/2021     escitalopram (LEXAPRO) 20 MG tablet Take 20 mg by mouth daily   10/19/2021 at AM     guaiFENesin (ROBITUSSIN) 100 mg/5 mL syrup Take 200 mg by  mouth every 6 hours as needed for cough    Unknown at Unknown time     levothyroxine (SYNTHROID, LEVOTHROID) 75 MCG tablet [LEVOTHYROXINE (SYNTHROID, LEVOTHROID) 75 MCG TABLET] Take 75 mcg by mouth daily.   10/19/2021 at AM     lidocaine (LIDODERM) 5 % patch Place 1 patch onto the skin daily as needed for moderate pain (rib pain) To prevent lidocaine toxicity, patient should be patch free for 12 hrs daily.   9/28/2021     losartan (COZAAR) 100 MG tablet Take 100 mg by mouth daily   10/19/2021 at AM     NIFEdipine (PROCARDIA XL) 90 MG 24 hr tablet [NIFEDIPINE (PROCARDIA XL) 90 MG 24 HR TABLET] Take 90 mg by mouth daily.   10/19/2021 at AM     nitroglycerin (NITROSTAT) 0.4 MG SL tablet [NITROGLYCERIN (NITROSTAT) 0.4 MG SL TABLET] Place 0.4 mg under the tongue every 5 (five) minutes as needed for chest pain.   Unknown at Unknown time     omeprazole (PRILOSEC) 20 MG capsule [OMEPRAZOLE (PRILOSEC) 20 MG CAPSULE] Take 20 mg by mouth 2 (two) times a day before meals.    10/19/2021 at 1600 x2     peg 400-propylene glycol (SYSTANE) 0.4-0.3 % Drop [-PROPYLENE GLYCOL (SYSTANE) 0.4-0.3 % DROP] Administer 1 drop to both eyes 4 (four) times a day.   10/19/2021 at 1300 x2     pramipexole (MIRAPEX) 0.25 MG tablet Take 0.25-0.5 mg by mouth nightly as needed   Unknown at Unknown time     pramipexole (MIRAPEX) 0.25 MG tablet Take 0.25 mg by mouth At Bedtime    10/18/2021 at PM     rosuvastatin (CRESTOR) 10 MG tablet [ROSUVASTATIN (CRESTOR) 10 MG TABLET] Take 10 mg by mouth at bedtime.   10/18/2021 at PM     senna-docusate (PERICOLACE) 8.6-50 mg tablet [SENNA-DOCUSATE (PERICOLACE) 8.6-50 MG TABLET] Take 1 tablet by mouth 2 (two) times a day. Do not administer if loose stools.  0 More than a month at Unknown time     torsemide (DEMADEX) 5 MG tablet [TORSEMIDE (DEMADEX) 5 MG TABLET] Take 5 mg by mouth daily.    10/19/2021 at AM     triamcinolone (KENALOG) 0.1 % cream Apply 1 Application topically 3 times daily as needed  (rash/itching/dermatitis)    Unknown at Unknown time         SOCIAL HISTORY:   she  reports that she has never smoked. She has never used smokeless tobacco. She reports that she does not drink alcohol and does not use drugs.      FAMILY HISTORY:  family history includes Breast Cancer in an other family member; Diabetes in an other family member; Heart Disease in an other family member.      REVIEW OF SYSTEMS:   Reviewed with patient. See HPI, otherwise negative.       PHYSICAL EXAMINATION:  Vitals: Temp:  [97.7  F (36.5  C)-98.4  F (36.9  C)] 98.4  F (36.9  C)  Pulse:  [88-97] 94  Resp:  [16-29] 18  BP: (144-189)/() 187/88  SpO2:  [95 %-98 %] 97 %  General: On examination, the patient is laying in bed, seems comfortable and disoriented, not answering questions appropriately.  SKIN: mild edema right hip and right upper arm, no ecchymosis or erythema.   Pulses:  radial and dorsalis pedis pulse is intact and equal bilaterally  Sensation: unable to asses  Tenderness: not tested due to known fractures  ROM: not tested due to known fractures   Motor: n/a      RADIOGRAPHIC EVALUATION:  Personally reviewed.     EXAM: XR PELVIS AND HIP RIGHT 2 VIEWS  LOCATION: [facility]  DATE/TIME: 10/19/2021 8:30 PM     INDICATION: hip pain  COMPARISON: None.                                                                      IMPRESSION: Acute fracture of the proximal right femur involving the intertrochanteric region and the base of the femoral neck. There is varus and apex anterior angulation. Underlying diffuse bony demineralization. Degenerative change in the lumbar   spine.    EXAM: CT HIP RIGHT W/O CONTRAST  LOCATION: [facility]  DATE/TIME: 10/19/2021 10:37 PM     INDICATION: Fracture, hip  COMPARISON: None.  TECHNIQUE: Noncontrast. Axial, sagittal and coronal thin-section reconstruction. Dose reduction techniques were used.      FINDINGS:      -Displaced and comminuted  intertrochanteric fracture of the proximal right femur with varus angulation. There is mild medial displacement of the lesser trochanter with impaction at the fracture site.  Bones are demineralized.    EXAM: XR SHOULDER RIGHT G/E 3 VIEWS  LOCATION: [facility]  DATE/TIME: 10/19/2021 8:30 PM     INDICATION: shoulder pain  COMPARISON: None.                                                                      IMPRESSION: Mildly impacted fracture of the humeral neck. No dislocation. Diffuse bony demineralization.     Degenerative changes in the visualized spine.         Kendra Alonzo PA-C, AMANDA  Date: 10/20/2021  Time: 11:07 AM    CC1:   Ochoa Sosa II, MD    CC2:   Reed Shore

## 2021-10-20 NOTE — PROGRESS NOTES
[facility]    Medicine Progress Note - Hospitalist Service       Date of Admission:  10/19/2021    Assessment & Plan           Patient is a 89-year-old female with a history of CKD 3, chronic anemia, hypothyroidism, paroxysmal SVT/AVNRT, CAD, hyperlipidemia, history of lumbar compression fracture that presented with fall and nondisplaced intertrochanteric fracture of the right femur and closed fracture of the proximal end of right humerus.    Nondisplaced right intertrochanteric fracture  -Planned orthopedic intervention at 1230 10/20/2021  -N.p.o.  -IV pain control    Nondisplaced right proximal humerus fracture  -Will be treated nonoperatively, nonweightbearing in the right upper extremity and forearm in the sling  -PT/OT    CKD 3  -serum creatinine 1.01 today, baseline.    Chronic anemia  -Hemoglobin stable at baseline 9.1    Hypothyroidism  -Continue home Synthroid    Paroxysmal SVT/AVNRT  -Continue home metoprolol, Cardiac evaluation prior to surgery  -TTE appreciated without significant contraindication for surgery.  Does have aortic stenosis, Moderate    Hypertension  -Not well controlled as above goal however unable to take morning p.o. medications  -IV hydralazine as needed added with parameters  -Continue PTA nifedipine 90 mg daily, losartan 100 mg daily,  -1 dose of IV metoprolol given for blood pressure control  -Restart home medications after surgery       Diet: NPO per Anesthesia Guidelines for Procedure/Surgery Except for: Meds    DVT Prophylaxis: Defer to surgical team  Boo Catheter: Not present  Central Lines: None  Code Status:   DNR/DNI after procedure. Family to bring in ACP documents    Disposition Plan   Expected discharge: Anticipate 2 to 3 days with discharge to TCU for therapy     The patient's care was discussed with the Patient.    Ochoa Sosa MD  Hospitalist Service  [facility]  Securely message with the Vocera Web Console (learn  more here)  Text page via [facility] Paging/Directory    This note was written with the Dragon audio recording device, any spelling or grammatical errors are unintentional.    ______________________________________________________________________    Interval History   Patient seen today without any chest pain or shortness of breath.  Reports pain in the right hip area and pain with movement of that leg.  Discussed her heart which she says is fine and is always been able to get through things.  Disoriented to person place and time.  Does say she would be okay with getting a TTE if it clears her for surgery.    The 10 point Review of Systems is negative other than noted in the HPI or here.     Data reviewed today: I reviewed all medications, new labs and imaging results over the last 24 hours.     Physical Exam   Vital Signs: Temp: 98.4  F (36.9  C) Temp src: Oral BP: (!) 188/68 Pulse: 100   Resp: 18 SpO2: 97 % O2 Device: Nasal cannula Oxygen Delivery: 2 LPM  Weight: 130 lbs 0 oz    Physical Examination:   General appearance - alert, well appearing, and in no distress   Mental status - alert, disoriented to person place and time  HEENT - sclera anicteric, left eye normal, right eye normal, hard of hearing  Neck - supple, no significant adenopathy  Respiratory - clear to auscultation, no wheezes, rales or rhonchi, symmetric air entry, no tachypnea, retractions or cyanosis  Cardiac - normal rate, regular rhythm, normal S1, S2, systolic murmur grade 3  Abdomen - soft, nontender, nondistended,  Neurological - alert, oriented, normal speech, no focal findings or movement disorder noted  Musculoskeletal - no joint tenderness,   Extremities - peripheral pulses normal, no pedal edema  Skin - normal coloration and turgor, no rashes, no suspicious skin lesions noted      Data   Recent Labs   Lab 10/20/21  0548 10/19/21  1956   WBC 10.5 11.3*   HGB 9.1* 10.9*   MCV 92 89    263    136   POTASSIUM 4.0 4.1   CHLORIDE 106  104   CO2 23 21*   BUN 22 25   CR 1.01 1.27*   ANIONGAP 8 11   KIN 9.5 10.2   * 141*   ALBUMIN 3.6  --    PROTTOTAL 6.0  --    BILITOTAL 0.3  --    ALKPHOS 90  --    ALT 12  --    AST 17  --          Recent Results (from the past 24 hour(s))   CT Head w/o Contrast    Narrative    EXAM: CT HEAD W/O CONTRAST  LOCATION: [facility]  DATE/TIME: 10/19/2021 8:15 PM    INDICATION: Trauma, injury, head pain.  COMPARISON: 1/1/2020  TECHNIQUE: Routine CT Head without IV contrast. Multiplanar reformats. Dose reduction techniques were used.    FINDINGS:  INTRACRANIAL CONTENTS: No intracranial hemorrhage, extraaxial collection, or mass effect.  No CT evidence of acute infarct. Moderate presumed chronic small vessel ischemic changes. Mild generalized volume loss. No hydrocephalus. Posterior fossa   structures including cerebellar hemispheres, fourth ventricle and CP angle cisterns are clear. Nasopharynx is clear.     VISUALIZED ORBITS/SINUSES/MASTOIDS: Prior bilateral cataract surgery. Visualized portions of the orbits are otherwise unremarkable. No paranasal sinus mucosal disease. No middle ear or mastoid effusion.    BONES/SOFT TISSUES: No acute abnormality.      Impression    IMPRESSION:  1.  No CT evidence for acute intracranial process.  2.  Brain atrophy and presumed chronic microvascular ischemic changes as above.   CT Cervical Spine w/o Contrast    Narrative    EXAM: CT CERVICAL SPINE W/O CONTRAST  LOCATION: [facility]  DATE/TIME: 10/19/2021 8:15 PM    INDICATION: Trauma injury, midline neck pain  COMPARISON: 1/1/2020  TECHNIQUE: Routine CT Cervical Spine without IV contrast. Multiplanar reformats. Dose reduction techniques were used.    FINDINGS:  No evidence of fracture or prevertebral soft tissue swelling.    Multilevel degenerative change with variable degrees of uncovertebral joint and facet hypertrophic change resulting in bilateral foraminal stenosis at C3-4,  C4-5, C5-C6, C6-C7, C7-T1.      Impression    IMPRESSION:  1.  Multilevel degenerative change throughout the cervical spine with no evidence of fracture.   XR Shoulder Right 3 Views    Narrative    EXAM: XR SHOULDER RIGHT G/E 3 VIEWS  LOCATION: [facility]  DATE/TIME: 10/19/2021 8:30 PM    INDICATION: shoulder pain  COMPARISON: None.      Impression    IMPRESSION: Mildly impacted fracture of the humeral neck. No dislocation. Diffuse bony demineralization.    Degenerative changes in the visualized spine.    XR Pelvis and Hip Right 2 Views    Narrative    EXAM: XR PELVIS AND HIP RIGHT 2 VIEWS  LOCATION: [facility]  DATE/TIME: 10/19/2021 8:30 PM    INDICATION: hip pain  COMPARISON: None.      Impression    IMPRESSION: Acute fracture of the proximal right femur involving the intertrochanteric region and the base of the femoral neck. There is varus and apex anterior angulation. Underlying diffuse bony demineralization. Degenerative change in the lumbar   spine.   CT Hip Right w/o Contrast    Narrative    EXAM: CT HIP RIGHT W/O CONTRAST  LOCATION: [facility]  DATE/TIME: 10/19/2021 10:37 PM    INDICATION: Fracture, hip  COMPARISON: None.  TECHNIQUE: Noncontrast. Axial, sagittal and coronal thin-section reconstruction. Dose reduction techniques were used.     FINDINGS:     -Displaced and comminuted intertrochanteric fracture of the proximal right femur with varus angulation. There is mild medial displacement of the lesser trochanter with impaction at the fracture site.  Bones are demineralized.     Echocardiogram Complete   Result Value    LVEF  60-65%    Narrative    [id number]  [id number]  [id number]  [id number]^LEONARDA^DAVIAN^[place] [address]     Name: SARAY ROBERT  MRN: [id number]  : 1928  Study Date: 10/20/2021 11:26 AM  Age: 89 yrs  Gender: Female  Patient Location: [facility]  Reason For Study:  Aortic Valve Disorder  Ordering Physician: DAVIAN BROWER  Performed By: ALCIDES     BSA: 1.7 m2  Height: 66 in  Weight: 130 lb  HR: 102  BP: 187/88 mmHg  ______________________________________________________________________________  ______________________________________________________________________________  Interpretation Summary     The left ventricle is normal in size.  The visual ejection fraction is 60-65%.  Normal left ventricular wall motion  The right ventricle is normal in size and function.  Moderate aortic valve calcification is present.  There is moderate aortic stenosis (aortic valve area = .75 - 1.0cm2).  ______________________________________________________________________________  Left Ventricle  The left ventricle is normal in size. There is borderline concentric left  ventricular hypertrophy. The visual ejection fraction is 60-65%. Normal left  ventricular wall motion.     Right Ventricle  The right ventricle is normal in size and function. TAPSE is normal, which is  consistent with normal right ventricular systolic function.     Atria  The left atrium is severely dilated. The right atrium is mildly dilated.     Mitral Valve  There is mild mitral annular calcification. The mitral valve leaflets appear  thickened, but open well. There is mild (1+) mitral regurgitation.     Tricuspid Valve  The tricuspid valve is not well visualized. There is mild (1+) tricuspid  regurgitation. The right ventricular systolic pressure is approximated at  37mmHg plus the right atrial pressure.     Aortic Valve  The aortic valve is trileaflet. Moderate aortic valve calcification is  present. There is mild (1+) aortic regurgitation. There is moderate aortic  stenosis (aortic valve area = .75 - 1.0cm2).     Pulmonic Valve  The pulmonic valve is not well visualized. There is no pulmonic valvular  regurgitation. There is no pulmonic valvular stenosis.     Vessels  Normal size ascending aorta.     Pericardium  There is  no pericardial effusion.     Rhythm  The rhythm was sinus tachycardia.  ______________________________________________________________________________  MMode/2D Measurements & Calculations     RVDd: 3.9 cm  IVSd: 1.1 cm  LVIDd: 4.3 cm  LVIDs: 2.9 cm  LVPWd: 1.0 cm  FS: 32.2 %  LV mass(C)d: 151.8 grams  LV mass(C)dI: 91.1 grams/m2  Ao root diam: 3.1 cm  LA dimension: 4.6 cm  asc Aorta Diam: 3.3 cm  LA/Ao: 1.5  LVOT diam: 2.1 cm  LVOT area: 3.6 cm2  LA Volume Indexed (AL/bp): 59.3 ml/m2  RWT: 0.49     Doppler Measurements & Calculations  Ao V2 max: 299.7 cm/sec  Ao max P.0 mmHg  Ao V2 mean: 210.6 cm/sec  Ao mean P.6 mmHg  Ao V2 VTI: 62.7 cm  DARREN(I,D): 1.7 cm2  DARREN(V,D): 1.6 cm2  LV V1 max P.2 mmHg  LV V1 max: 134.4 cm/sec  LV V1 VTI: 29.3 cm  SV(LVOT): 104.5 ml  SI(LVOT): 62.7 ml/m2  PA acc time: 0.10 sec  TR max matias: 302.7 cm/sec  TR max P.6 mmHg  AV Matias Ratio (DI): 0.45  DARREN Index (cm2/m2): 1.0     ______________________________________________________________________________  Report approved by: Samuel Clement 10/20/2021 12:26 PM             Medications       [Auto Hold] calcium carbonate  500 mg Oral BID     [Auto Hold] escitalopram  20 mg Oral Daily     [Auto Hold] levothyroxine  75 mcg Oral Daily     [Auto Hold] losartan  100 mg Oral Daily     [Auto Hold] NIFEdipine ER OSMOTIC  90 mg Oral Daily     [Auto Hold] pantoprazole  40 mg Oral BID AC     [Auto Hold] polyethylene glycol-propylene glycol  1 drop Both Eyes 4x Daily     [Auto Hold] pramipexole  0.25 mg Oral At Bedtime     [Auto Hold] rosuvastatin  10 mg Oral At Bedtime     [Auto Hold] sodium chloride (PF)  3 mL Intracatheter Q8H     [Held by provider] torsemide  5 mg Oral Daily

## 2021-10-20 NOTE — H&P
H&P reviewed and agree with the findings.     Briefly: 89 y F with cardiac history, dementia, now after a fall with right intertrochaneric femur fracture    S:  Skin intact  Leg shortened and externally rotated  Motor intact df/pf/ehl  SILT r/m/u  Foot wwp, 2+ DP pulse        A/P: 89 y F with right peritrochanteric femur fracutre    -To OR for right IMN  - Bedrest  -Boo  -Risks and benefits discussed with POA, patient's son. Plan for DNR to stand during surgery. They understand the high risk of cardiac complication  -Appreciate cardiology input prior to surgery    SAMI FIERRO MD

## 2021-10-21 ENCOUNTER — APPOINTMENT (OUTPATIENT)
Dept: PHYSICAL THERAPY | Facility: HOSPITAL | Age: 86
DRG: 481 | End: 2021-10-21
Attending: PHYSICIAN ASSISTANT
Payer: MEDICARE

## 2021-10-21 ENCOUNTER — APPOINTMENT (OUTPATIENT)
Dept: OCCUPATIONAL THERAPY | Facility: HOSPITAL | Age: 86
DRG: 481 | End: 2021-10-21
Attending: PHYSICIAN ASSISTANT
Payer: MEDICARE

## 2021-10-21 PROBLEM — N17.9 ACUTE KIDNEY FAILURE, UNSPECIFIED (H): Status: ACTIVE | Noted: 2021-10-21

## 2021-10-21 LAB
ANION GAP SERPL CALCULATED.3IONS-SCNC: 9 MMOL/L (ref 5–18)
ATRIAL RATE - MUSE: 88 BPM
BLD PROD TYP BPU: NORMAL
BLOOD COMPONENT TYPE: NORMAL
BUN SERPL-MCNC: 27 MG/DL (ref 8–28)
CALCIUM SERPL-MCNC: 8.9 MG/DL (ref 8.5–10.5)
CHLORIDE BLD-SCNC: 111 MMOL/L (ref 98–107)
CO2 SERPL-SCNC: 20 MMOL/L (ref 22–31)
CODING SYSTEM: NORMAL
CREAT SERPL-MCNC: 1.52 MG/DL (ref 0.6–1.1)
CROSSMATCH: NORMAL
DIASTOLIC BLOOD PRESSURE - MUSE: 64 MMHG
ERYTHROCYTE [DISTWIDTH] IN BLOOD BY AUTOMATED COUNT: 14.3 % (ref 10–15)
GFR SERPL CREATININE-BSD FRML MDRD: 29 ML/MIN/1.73M2
GLUCOSE BLD-MCNC: 135 MG/DL (ref 70–125)
HCT VFR BLD AUTO: 21.9 % (ref 35–47)
HGB BLD-MCNC: 6.6 G/DL (ref 11.7–15.7)
HGB BLD-MCNC: 8.3 G/DL (ref 11.7–15.7)
INTERPRETATION ECG - MUSE: NORMAL
ISSUE DATE AND TIME: NORMAL
MCH RBC QN AUTO: 28.4 PG (ref 26.5–33)
MCHC RBC AUTO-ENTMCNC: 30.1 G/DL (ref 31.5–36.5)
MCV RBC AUTO: 94 FL (ref 78–100)
P AXIS - MUSE: 69 DEGREES
PLATELET # BLD AUTO: 165 10E3/UL (ref 150–450)
POTASSIUM BLD-SCNC: 4.2 MMOL/L (ref 3.5–5)
PR INTERVAL - MUSE: 170 MS
QRS DURATION - MUSE: 110 MS
QT - MUSE: 378 MS
QTC - MUSE: 457 MS
R AXIS - MUSE: -56 DEGREES
RBC # BLD AUTO: 2.32 10E6/UL (ref 3.8–5.2)
SODIUM SERPL-SCNC: 140 MMOL/L (ref 136–145)
SYSTOLIC BLOOD PRESSURE - MUSE: 145 MMHG
T AXIS - MUSE: 70 DEGREES
UNIT ABO/RH: NORMAL
UNIT NUMBER: NORMAL
UNIT STATUS: NORMAL
UNIT TYPE ISBT: 1700
VENTRICULAR RATE- MUSE: 88 BPM
WBC # BLD AUTO: 16.1 10E3/UL (ref 4–11)

## 2021-10-21 PROCEDURE — 250N000013 HC RX MED GY IP 250 OP 250 PS 637: Performed by: PHYSICIAN ASSISTANT

## 2021-10-21 PROCEDURE — 250N000013 HC RX MED GY IP 250 OP 250 PS 637: Performed by: STUDENT IN AN ORGANIZED HEALTH CARE EDUCATION/TRAINING PROGRAM

## 2021-10-21 PROCEDURE — 36415 COLL VENOUS BLD VENIPUNCTURE: CPT | Performed by: FAMILY MEDICINE

## 2021-10-21 PROCEDURE — 250N000013 HC RX MED GY IP 250 OP 250 PS 637: Performed by: FAMILY MEDICINE

## 2021-10-21 PROCEDURE — 80048 BASIC METABOLIC PNL TOTAL CA: CPT | Performed by: FAMILY MEDICINE

## 2021-10-21 PROCEDURE — 85018 HEMOGLOBIN: CPT | Performed by: FAMILY MEDICINE

## 2021-10-21 PROCEDURE — 97166 OT EVAL MOD COMPLEX 45 MIN: CPT | Mod: GO | Performed by: OCCUPATIONAL THERAPIST

## 2021-10-21 PROCEDURE — 97161 PT EVAL LOW COMPLEX 20 MIN: CPT | Mod: GP

## 2021-10-21 PROCEDURE — P9016 RBC LEUKOCYTES REDUCED: HCPCS | Performed by: STUDENT IN AN ORGANIZED HEALTH CARE EDUCATION/TRAINING PROGRAM

## 2021-10-21 PROCEDURE — 97530 THERAPEUTIC ACTIVITIES: CPT | Mod: GP

## 2021-10-21 PROCEDURE — 85027 COMPLETE CBC AUTOMATED: CPT | Performed by: FAMILY MEDICINE

## 2021-10-21 PROCEDURE — 86923 COMPATIBILITY TEST ELECTRIC: CPT | Performed by: STUDENT IN AN ORGANIZED HEALTH CARE EDUCATION/TRAINING PROGRAM

## 2021-10-21 PROCEDURE — 120N000001 HC R&B MED SURG/OB

## 2021-10-21 PROCEDURE — 250N000011 HC RX IP 250 OP 636: Performed by: PHYSICIAN ASSISTANT

## 2021-10-21 PROCEDURE — 99232 SBSQ HOSP IP/OBS MODERATE 35: CPT | Performed by: FAMILY MEDICINE

## 2021-10-21 RX ADMIN — PRAMIPEXOLE DIHYDROCHLORIDE 0.25 MG: 0.25 TABLET ORAL at 20:55

## 2021-10-21 RX ADMIN — ASPIRIN 81 MG: 81 TABLET, COATED ORAL at 08:02

## 2021-10-21 RX ADMIN — ESCITALOPRAM OXALATE 20 MG: 20 TABLET ORAL at 08:01

## 2021-10-21 RX ADMIN — PANTOPRAZOLE SODIUM 40 MG: 20 TABLET, DELAYED RELEASE ORAL at 06:30

## 2021-10-21 RX ADMIN — PANTOPRAZOLE SODIUM 40 MG: 20 TABLET, DELAYED RELEASE ORAL at 16:41

## 2021-10-21 RX ADMIN — CEPHALEXIN 250 MG: 250 CAPSULE ORAL at 11:09

## 2021-10-21 RX ADMIN — DOCUSATE SODIUM 50 MG AND SENNOSIDES 8.6 MG 1 TABLET: 8.6; 5 TABLET, FILM COATED ORAL at 08:01

## 2021-10-21 RX ADMIN — CALCIUM CARBONATE (ANTACID) CHEW TAB 500 MG 500 MG: 500 CHEW TAB at 08:02

## 2021-10-21 RX ADMIN — NIFEDIPINE 90 MG: 60 TABLET, FILM COATED, EXTENDED RELEASE ORAL at 08:02

## 2021-10-21 RX ADMIN — POLYPROPYLENE GLYCOL 400, PROPYLENE GLYCOL 1 DROP: .4; .3 LIQUID OPHTHALMIC at 14:14

## 2021-10-21 RX ADMIN — ACETAMINOPHEN 975 MG: 325 TABLET ORAL at 20:54

## 2021-10-21 RX ADMIN — POLYPROPYLENE GLYCOL 400, PROPYLENE GLYCOL 1 DROP: .4; .3 LIQUID OPHTHALMIC at 16:41

## 2021-10-21 RX ADMIN — POLYPROPYLENE GLYCOL 400, PROPYLENE GLYCOL 1 DROP: .4; .3 LIQUID OPHTHALMIC at 08:03

## 2021-10-21 RX ADMIN — LEVOTHYROXINE SODIUM 75 MCG: 0.03 TABLET ORAL at 08:01

## 2021-10-21 RX ADMIN — CEFAZOLIN 1 G: 1 INJECTION, POWDER, FOR SOLUTION INTRAMUSCULAR; INTRAVENOUS at 08:01

## 2021-10-21 RX ADMIN — ROSUVASTATIN CALCIUM 10 MG: 10 TABLET, FILM COATED ORAL at 20:55

## 2021-10-21 RX ADMIN — CEFAZOLIN 1 G: 1 INJECTION, POWDER, FOR SOLUTION INTRAMUSCULAR; INTRAVENOUS at 00:10

## 2021-10-21 RX ADMIN — LOSARTAN POTASSIUM 100 MG: 25 TABLET, FILM COATED ORAL at 08:02

## 2021-10-21 RX ADMIN — DOCUSATE SODIUM 50 MG AND SENNOSIDES 8.6 MG 1 TABLET: 8.6; 5 TABLET, FILM COATED ORAL at 20:55

## 2021-10-21 RX ADMIN — POLYETHYLENE GLYCOL 3350 17 G: 17 POWDER, FOR SOLUTION ORAL at 08:01

## 2021-10-21 RX ADMIN — ACETAMINOPHEN 975 MG: 325 TABLET ORAL at 04:27

## 2021-10-21 RX ADMIN — ASPIRIN 81 MG: 81 TABLET, COATED ORAL at 20:55

## 2021-10-21 RX ADMIN — ACETAMINOPHEN 975 MG: 325 TABLET ORAL at 14:13

## 2021-10-21 RX ADMIN — CALCIUM CARBONATE (ANTACID) CHEW TAB 500 MG 500 MG: 500 CHEW TAB at 20:55

## 2021-10-21 RX ADMIN — POLYPROPYLENE GLYCOL 400, PROPYLENE GLYCOL 1 DROP: .4; .3 LIQUID OPHTHALMIC at 21:50

## 2021-10-21 ASSESSMENT — ACTIVITIES OF DAILY LIVING (ADL)
ADLS_ACUITY_SCORE: 11
ADLS_ACUITY_SCORE: 13
ADLS_ACUITY_SCORE: 11
ADLS_ACUITY_SCORE: 11
ADLS_ACUITY_SCORE: 13
ADLS_ACUITY_SCORE: 11
ADLS_ACUITY_SCORE: 13
ADLS_ACUITY_SCORE: 11
ADLS_ACUITY_SCORE: 11
ADLS_ACUITY_SCORE: 13

## 2021-10-21 NOTE — BRIEF OP NOTE
[facility]    Brief Operative Note    Pre-operative diagnosis: Closed nondisplaced intertrochanteric fracture of right femur, initial encounter (H) [T30.810V]  Post-operative diagnosis Same as pre-operative diagnosis    Procedure: Procedure(s):  INTERNAL FIXATION, FRACTURE, TROCHANTERIC, HIP, USING IM NAIL, USING HANA TABLE  Surgeon: Surgeon(s) and Role:     * Greg Reed MD - Primary  Anesthesia: Choice   Estimated Blood Loss: 50 mL from 10/20/2021  4:22 PM to 10/20/2021  5:59 PM      Drains: None  Specimens: * No specimens in log *  Findings:   fracture.  Complications: None.  Implants:   Implant Name Type Inv. Item Serial No.  Lot No. LRB No. Used Action   IMP NAIL SYN CAN FEM PROX TFNA [id number] 130D 04.037.142S - [id number] Metallic Hardware/[place] IMP NAIL SYN CAN FEM PROX TFNA [id number] 130D 04.037.142S  [employer] All [id number] Right 1 Implanted   IMP SCR SYN TFNA FENESTRATED LAG 95MM 04.038.195S - [id number] Metallic Hardware/[place] IMP SCR SYN TFNA FENESTRATED LAG 95MM 04.038.195S  [employer] [id number] Right 1 Implanted   IMP SCR SYN 5.0 TI LOCK T25 STARDRIVE 40MM 04.005.530 - [id number] Metallic Hardware/[place] IMP SCR SYN 5.0 TI LOCK T25 STARDRIVE 40MM [id number]  [employer] All N/A Right 1 Implanted

## 2021-10-21 NOTE — PROGRESS NOTES
S: recovering uneventfully in PACU  O:NAD  RLE:  Dressings c/d/i  2+ DP pulse, foot wwp  Motor intact df/pf/ehl    A/P: POD0 s/p IMN R intertrochanteric femur fx    -WBAT RLE  -Platform weight bearing with RUE due to proximal humerus fx  -Minimize narcotics  -[id number]  -24 hrs ancef  -Ca/Vit D for bone health  -PT/OT  -Appreciate medical team management

## 2021-10-21 NOTE — PROGRESS NOTES
Care Management Follow Up    Length of Stay (days): 2    Expected Discharge Date:       Concerns to be Addressed:       Patient plan of care discussed at interdisciplinary rounds: Yes    Anticipated Discharge Disposition:  Skilled Nursing facility   Anticipated Discharge Services:  TCU Pt/OT  Anticipated Discharge DME:      Patient/family educated on Medicare website which has current facility and service quality ratings:  yes  Education Provided on the Discharge Plan:  yes  Patient/Family in Agreement with the Plan:  yes    Referrals Placed by CM/SW:  1. [facility], 2. [facility], 3. [facility]  Private pay costs discussed: private room/amenity fees    Additional Information:  SW met with pt and pt son Chad in pt room. Pt and son both agree that TCU is a good plan following hospital stay. Pt stated she has been to [facility] in the past. Son requested referrals be sent to [facility] and [facility] from medicare/gov list. Referrals sent.       CONG Argueta

## 2021-10-21 NOTE — PLAN OF CARE
Problem: Adult Inpatient Plan of Care  Goal: Plan of Care Review  Outcome: Improving  Goal: Patient-Specific Goal (Individualized)  Outcome: Improving  Goal: Absence of Hospital-Acquired Illness or Injury  Outcome: Improving  Intervention: Identify and Manage Fall Risk  Recent Flowsheet Documentation  Taken 10/21/2021 0427 by Msiti Wheeler RN  Safety Promotion/Fall Prevention: bed alarm on  Taken 10/21/2021 0010 by Misti Wheeler RN  Safety Promotion/Fall Prevention: bed alarm on  Intervention: Prevent Skin Injury  Recent Flowsheet Documentation  Taken 10/21/2021 0427 by Misti Wheeler, RN  Body Position:   turned   left  Taken 10/21/2021 0215 by Misti Wheeler RN  Body Position:   turned   supine  Taken 10/21/2021 0010 by Misti Wheeler RN  Body Position:   turned   left  Goal: Optimal Comfort and Wellbeing  Outcome: Improving  Goal: Readiness for Transition of Care  Outcome: Improving     Problem: Risk for Delirium  Goal: Optimal Coping  Outcome: Improving  Goal: Improved Behavioral Control  Outcome: Improving  Goal: Improved Attention and Thought Clarity  Outcome: Improving  Goal: Improved Sleep  Outcome: Improving     Pt very sleepy first half of shift. Pt drowsy now with continued confusion, a/o x 1. Pt t/r with assist of 2. Pain controlled with scheduled tylenol and prn ice packs. Falls precautions in place. Will continue to monitor.

## 2021-10-21 NOTE — ANESTHESIA POSTPROCEDURE EVALUATION
Patient: Johanna Washburn    Procedure: Procedure(s):  INTERNAL FIXATION, FRACTURE, TROCHANTERIC, HIP, USING IM NAIL, USING HANA TABLE       Diagnosis:Closed nondisplaced intertrochanteric fracture of right femur, initial encounter (H) [F07.773E]  Diagnosis Additional Information: No value filed.    Anesthesia Type:  General    Note:  Disposition: Inpatient   Postop Pain Control: Uneventful            Sign Out: Well controlled pain   PONV: No   Neuro/Psych: Uneventful            Sign Out: Acceptable/Baseline neuro status   Airway/Respiratory: Uneventful            Sign Out: Acceptable/Baseline resp. status   CV/Hemodynamics: Uneventful            Sign Out: Acceptable CV status   Other NRE: NONE   DID A NON-ROUTINE EVENT OCCUR? No    Event details/Postop Comments:  Hypertension in PACU with systolics to 200s. Resolved after 5mg IV labetalol. She was having frequent PVCs on tele so I did check a BMP and mag. Mag was on the low end of normal at 1.8 so 2g IV mag ordered. Patient denied any discomfort in PACU. Able to respond to simple questions but not oriented to place or situation.            Last vitals:  Vitals Value Taken Time   /63 10/20/21 1900   Temp 37.4  C (99.3  F) 10/20/21 1845   Pulse 57 10/20/21 1923   Resp 22 10/20/21 1922   SpO2 97 % 10/20/21 1923   Vitals shown include unvalidated device data.    Electronically Signed By: Marcos Welsh MD  October 20, 2021  7:37 PM

## 2021-10-21 NOTE — SIGNIFICANT EVENT
Hemoglobin dropped to 6.6 this morning.  Ordered 1 unit of blood for transfusion. Consent obtained from her son Awais.   Brady Funez MD

## 2021-10-21 NOTE — PROGRESS NOTES
"Orthopedic Progress Note      Assessment: 1 Day Post-Op  S/P Procedure(s):  INTERNAL FIXATION, FRACTURE, TROCHANTERIC, HIP, USING IM NAIL, USING HANA TABLE   -Right proximal humerus fracture     Plan:   - PT/OT - encourage patient to be up in chair, ambulate if possible, etc.   - Weightbearing status: WBAT  With platform walker, Platform weight bearing with RUE due to proximal humerus fx.  - sling for comfort RUE  - pain management - minimize narcotics due to mental status   - Hgb dropped today to 6.6, 1 unit given, medicine to continue monitoring.   - Anticoagulation: ASA 81 PO BID in addition to SCDs, kim stockings and early ambulation.  - Discharge planning: pending medical clearance, transfer to TCU? Will continue to monitor.       Subjective:  Limited due to dementia.     Denies pain at rest.     Objective: limited due to dementia.   /57 (BP Location: Right arm)   Pulse 76   Temp 97.5  F (36.4  C) (Oral)   Resp 16   Ht 1.676 m (5' 6\")   Wt 59 kg (130 lb)   SpO2 94%   Breastfeeding No   BMI 20.98 kg/m    The patient is disoriented, dementia. Laying in bed appears comfortable.   Sensation is intact right hand. Otherwise unable to assess.   Dorsiflexion and plantar flexion is intact.   Dorsalis pedis pulse intact.  Calves are soft and non-tender.   The incision is covered. Dressing has minimal drainage proximally.     Pertinent Labs   Lab Results: personally reviewed.   Lab Results   Component Value Date    INR 1.02 09/02/2018     Lab Results   Component Value Date    WBC 16.1 (H) 10/21/2021    HGB 6.6 (LL) 10/21/2021    HCT 21.9 (L) 10/21/2021    MCV 94 10/21/2021     10/21/2021     Lab Results   Component Value Date     10/21/2021    CO2 20 (L) 10/21/2021         Report completed by:  Kendra Alonzo PA-C, AMANDA  Date: 10/21/2021  Time: 1:10 PM    I discussed the patient with the PA and agree with the assessment and plan. Agree with transfusion. Post transfusion CBC incremented " appropriate to Hb 8.3. Continue WBAT RLE, platform WB RUE.    SAMI FIERRO MD

## 2021-10-21 NOTE — PROGRESS NOTES
Occupational Therapy       10/21/21 1415   Quick Adds   Type of Visit Initial Occupational Therapy Evaluation   Living Environment   People in home alone   Current Living Arrangements assisted living   Self-Care   Usual Activity Tolerance good   Current Activity Tolerance fair   Equipment Currently Used at Home   (4WW )   Activity/Exercise/Self-Care Comment Per son pt is typically idependent with basic ADLs of dressing and bathing but requires assist with med mng, meal prep, home management  (Enjoys reading and gardening )   Disability/Function   Hearing Difficulty or Deaf no   Wear Glasses or Blind yes   Vision Management Reading glasses    Concentrating, Remembering or Making Decisions Difficulty yes   Difficulty Communicating no   Difficulty Eating/Swallowing no   Walking or Climbing Stairs Difficulty yes   Walking or Climbing Stairs ambulation difficulty, requires equipment   Dressing/Bathing Difficulty no   Toileting issues no   Doing Errands Independently Difficulty (such as shopping) yes   Fall history within last six months yes   Change in Functional Status Since Onset of Current Illness/Injury yes   General Information   Onset of Illness/Injury or Date of Surgery 10/19/21   Referring Physician Kendra Alonzo PA-C   Existing Precautions/Restrictions fall;weight bearing  (NWB RUE, PRW for mobility, sling )   Right Upper Extremity (Weight-bearing Status) non weight-bearing (NWB)   Right Lower Extremity (Weight-bearing Status) weight-bearing as tolerated (WBAT)   Cognitive Status Examination   Orientation Status person   Follows Commands follows one-step commands   Memory Deficit moderate deficit   Visual Perception   Visual Impairment/Limitations unable/difficult to assess  (Pt closing eyes throughout session )   Sensory   Sensory Quick Adds No deficits were identified   Pain Assessment   Patient Currently in Pain No   Range of Motion Comprehensive   Comment, General Range of Motion LUE AROM WFL,  all joints. RUE limited by precautions - full R elbow and digit AROM -  wrist limited    Strength Comprehensive (MMT)   General Manual Muscle Testing (MMT) Assessment upper extremity strength deficits identified   Comment, General Manual Muscle Testing (MMT) Assessment RUE NT d/t precautions, LUE with moderate weakness.    Coordination   Upper Extremity Coordination Left UE impaired;Right UE impaired  (FMC, mild )   Transfers   Transfer Comments Pt fatigued following recent t/fs from bed- per PT report MOD A +2.    Activities of Daily Living   BADL Assessment grooming   Grooming Assessment   [place] Level (Grooming) moderate assist (50% patient effort)   Position (Grooming) supported sitting   Comment (Grooming) Washing face using LUE with [place]    Clinical Impression   Criteria for Skilled Therapeutic Interventions Met (OT) yes;skilled treatment is necessary   OT Diagnosis Decreased endurance and precautions limiting ADL    OT Problem List-Impairments impacting ADL problems related to;activity tolerance impaired;balance;cognition;coordination;inability to direct their own care;mobility;range of motion (ROM);strength;pain;post-surgical precautions   Assessment of Occupational Performance 3-5 Performance Deficits   Identified Performance Deficits Decreased endurance and precautions limiting ADL   Planned Therapy Interventions (OT) ADL retraining;IADL retraining;bed mobility training;balance training;cognition;motor coordination training;ROM;strengthening;stretching;transfer training;visual perception;home program guidelines;progressive activity/exercise;risk factor education   Clinical Decision Making Complexity (OT) moderate complexity   Therapy Frequency (OT) 2x/day   Predicted Duration of Therapy 7 days    Anticipated Equipment Needs Upon Discharge (OT)   (TBD - dependent on what assisted can provide )   Risk & Benefits of therapy have been explained evaluation/treatment results reviewed   OT Discharge Planning     OT Discharge Recommendation (DC Rec) Transitional Care Facility   OT Rationale for DC Rec Pt requires +2 assist for mobility and assist for all ADL and IADL    Total Evaluation Time (Minutes)   Total Evaluation Time (Minutes) 20

## 2021-10-21 NOTE — OR NURSING
Pt has sling on right arm, radial pulse palpable +2 , warm fingers, able to move fingers, rapid capillary refill.

## 2021-10-21 NOTE — PROGRESS NOTES
10/21/21 1350   Quick Adds   Type of Visit Initial PT Evaluation   Living Environment   People in home facility resident  ([facility])   Current Living Arrangements assisted living   Home Accessibility no concerns   Self-Care   Usual Activity Tolerance moderate   Current Activity Tolerance poor   Equipment Currently Used at Home walker, rolling  (4WW)   Activity/Exercise/Self-Care Comment per chart review, pt total cares. [place], son reports pt able to do some things by herself    General Information   Onset of Illness/Injury or Date of Surgery 10/19/21  (surgery 10/20/2021)   Referring Physician Kendra Alonzo PA-C   Patient/Family Therapy Goals Statement (PT) none stated   Pertinent History of Current Problem (include personal factors and/or comorbidities that impact the POC) Right peritrochanteric femur fracture. Right proximal humerus fracture   Weight-Bearing Status - RUE weight-bearing as tolerated  (with platform walker)   Weight-Bearing Status - RLE weight-bearing as tolerated   Pain Assessment   Patient Currently in Pain Yes, see Vital Sign flowsheet  (with mobility)   Range of Motion (ROM)   ROM Quick Adds ROM deficits secondary to weakness;ROM deficits secondary to pain;ROM deficits secondary to surgical procedure   Strength   Manual Muscle Testing Quick Adds Deficits observed during functional mobility   Bed Mobility   Bed Mobility supine-sit   Supine-Sit [place] (Bed Mobility) maximum assist (25% patient effort);2 person assist   Bed Mobility Limitations decreased ability to use arms for pushing/pulling;decreased ability to use legs for bridging/pushing   Impairments Contributing to Impaired Bed Mobility impaired balance;decreased strength;pain  (orthopedic restrictions)   Assistive Device (Bed Mobility) bed rails;draw sheet   Comment (Bed Mobility) needing 2 person assist for BLEs towards EOB. Unable to move RLE towards EOB w/o mod-maxA. Upper trunk pt needing significant assist d/t  inability to use RUE d/t pain   Transfers   Transfers sit-stand transfer;bed-chair transfer   Bed-Chair Transfer   Bed-Chair [place] (Transfers) moderate assist (50% patient effort);2 person assist   Assistive Device (Bed-Chair Transfers) other (see comments)  (arm in arm - kept RUE NWB currently d/t pain)   Bed/Chair Transfer Comments pt able to move BLE towards chair (to L) with arm in arm of 2, inc v/c for technique and upright.    Sit-Stand Transfer   Sit-Stand [place] (Transfers) moderate assist (50% patient effort);maximum assist (25% patient effort)   Assistive Device (Sit-Stand Transfers) other (see comments)  (arm in arm of 2 - NWB maintained on RUE d/t pain)   Sit/Stand Transfer Comments sit <> stand x 2, once from EOB, once from chair. From EOB, pt able to stand with modA x 2 arm in arm. From chair, pt able to stand with maxA x 2 arm in arm. Reports pain   Balance   Balance other (describe)   Balance Comments able to do anterior-posterior leaning in sitting with good control. Standing, pt having inc pain and unable to stand for more than 10 seconds. Needing inc v/c for upright   Clinical Impression   Criteria for Skilled Therapeutic Intervention yes, treatment indicated   PT Diagnosis (PT) impaired funtional mobility, gait abnormality   Influenced by the following impairments decreased strength, decreased endurance, pain, orthopedic restrictions   Functional limitations due to impairments ambulation, bed mobility, transfers   Clinical Presentation Stable/Uncomplicated   Clinical Presentation Rationale pt presents as medically diagnosed   Clinical Decision Making (Complexity) low complexity   Therapy Frequency (PT) 2x/day   Predicted Duration of Therapy Intervention (days/wks) 7 days   Planned Therapy Interventions (PT) balance training;bed mobility training;gait training;home exercise program;patient/family education;strengthening;transfer training   Anticipated Equipment Needs at Discharge (PT)  walker, platform;wheelchair   Risk & Benefits of therapy have been explained evaluation/treatment results reviewed;patient;son   PT Discharge Planning    PT Discharge Recommendation (DC Rec) Transitional Care Facility;home with assist;home with home care physical therapy   PT Rationale for DC Rec pt heavy assist of 2 for all mobillity right now. Will need TCU to improve strength and endurance needed for functional mobility. If pt were to return to [facility], she would need assist of 2 for all transfers, would need a platform walker, and would need a w/c for long distance mobility   Total Evaluation Time   Total Evaluation Time (Minutes) 10

## 2021-10-21 NOTE — OP NOTE
Preoperative diagnosis:  1.  Right peritrochanteric femur fracture  2. Right proximal humerus fracture    Postoperative diagnosis:  1.  Right peritrochanteric femur fracture  2. Right proximal humerus fracture    Procedure(s) performed:  1. Intramedullary nailing of right peritrochanteric femur fracture  2. Closed management of right proximal humerus fracture    Attending Surgeon: Greg Hernandez MD  Assistant: Kendra Alonzo PA-C (A skilled assistant was necessary for this procedure to help with patient positioning, manipulation and prep the surgical extremity, instrumentation, and safe/timely progress of the procedure.)    Anesthesia: general  IV fluids: See anesthesia record  Estimated blood loss: 50 mL    Specimens: None  Drains: None    Complications: None apparent  Disposition: Stable to PACU     Implant:  Synthes TFN a short nail, 11 mm in diameter, 130 degree angle. 95 mm long TFN a fenestrated screw. 40 mm long distal interlock screw.      Indications:  This patient is a pleasant 89-year-old female, history of advanced dementia as well as cardiac disease, sustained a fall yesterday with right hip pain and deformity and right shoulder pain.. She presented to the ER where x-rays revealed a basicervical fracture about the peritrochanteric region. CT scan was utilized to confirm this. X-rays also revealed a proximal humerus fracture is minimally displaced. The patient was admitted to the medical service and preoperative work-up was performed. Cardiology was consulted for preoperative clearance and an echo was performed. They deemed her to be moderate risk. Risks, benefits, and alternatives were reviewed with the patient's son, Chad Washburn. Surgical fixation of his hip was recommended and he agreed that he wanted to proceed with this for his mother. Informed consent was obtained. He wanted her to remain DNR throughout the procedure as this was her wish and we agreed.    Operative findings:   Peritrochanteric  fracture with comminution, basicervical.    Procedure in detail: Right  The patient was identified in preoperative holding where the right hip was marked and surgical site was confirmed.  The patient was then brought to the operating room and positioned on the [place] table. General anesthesia was administered. The surgical hip was then prepped and draped in the typical sterile fashion.  A presurgical timeout was completed.  Antibiotics were administered by anesthesia just prior to incision.     Utilizing a combination of traction, internal rotation, adduction and I was able to achieve a satisfactory reduction of the fracture under closed means. Fluoroscopy confirmed good reduction on AP and lateral imaging. The fracture table held the leg positioned nicely. The right lower extremity then prepped and draped in the usual sterile fashion. I made a small incision over the gluteal area and advanced the starting wire down to the tip of the greater trochanter. I centered this on the AP and lateral x-rays of the tip of the greater trochanter. I advanced this into the bone. It skived a bit I was not satisfied with the position. Therefore I utilized the parallel guide with a 4 mm offset and reposition the pin a bit. The second pin had excellent position at the tip of the greater trochanter and I was satisfied. I remove the first pin. I then utilized opening reamer to gain access into the intramedullary canal.    I advanced the 11 mm, 130 degree short TFNA nail through the opening in the greater trochanter down into the intramedullary canal. This was gently advanced by hand without the need for any impaction. The fracture held this reduction throughout the insertion of the nail. I then made a lateral incision and advanced the triple sleeve down to bone. I placed the pin into the femoral head and this had good position on AP and lateral x-ray with a satisfactory tip to apex distance. I measured this to be a 95 mm long sliding  screw. I then drilled the lateral cortex and used the step drill to drill to 95 mm in depth. I then advanced a 95 mm long sliding screw over the guidepin. This had good purchase in the subchondral bone in the femoral head. I then tighten the set screw all the way down and backed off a half turn to allow for dynamic sliding. I utilized the compression mechanism to get a little bit of compression across the fracture site and this was confirmed on fluoroscopy.    Utilizing the distal sleeve and an additional incision laterally, I was able to place a distal interlock screw that was 40 mm in length. The aiming arm was then removed. Final x-ray was taken this confirmed good reduction of the peritrochanteric fracture with screw fixation in the femoral head and a good bicortical distal interlock screw. Wounds were then copiously irrigated normal saline. 1 g of vancomycin powder was placed in all wounds. 2-0 Vicryl was used to close the subcutaneous tissue. Staples were used approximate the skin. Wounds were then dressed with Mepilex dressings.    The drapes were then taken down. Attention was turned to her right proximal humerus and I placed her arm in a sling.  Anesthesia was reversed and the patient was taken to recovery in stable condition.  No complications were noted.  All sponge and needle counts were correct.    Postoperative plan:  -WBAT RLE  -Platform weight bearing with RUE due to proximal humerus fx, sling for comfort  -Minimize narcotics  -Multimodal DVT prophylaxis will consist of early mobilization, lower extremity weightbearing, SCDs, JANICE hose, and aspirin 81 mg p.o. twice daily x1 month.  -24 hrs ancef  -Ca/Vit D for bone health  -PT/OT  -Appreciate medical team management

## 2021-10-21 NOTE — PROGRESS NOTES
[facility]    Medicine Progress Note - Hospitalist Service       Date of Admission:  10/19/2021    Assessment & Plan         Patient is a 89-year-old female with a history of CKD 3, chronic anemia, hypothyroidism, paroxysmal SVT/AVNRT, CAD, hyperlipidemia, history of lumbar compression fracture that presented with fall and nondisplaced intertrochanteric fracture of the right femur and closed fracture of the proximal end of right humerus.    Nondisplaced right intertrochanteric fracture  -Planned orthopedic intervention at 1230 10/20/2021  -Regular diet  -PT/OT  -Incentive spirometry  -IV pain control    Nondisplaced right proximal humerus fracture  -Will be treated nonoperatively, nonweightbearing in the right upper extremity and forearm in the sling  -PT/OT    JEFFREY on CKD 3  -serum creatinine 1.52 today, baseline 1.0-1.25.  -suspect pre-renal, prolonged NPO with HTN and now acute blood loss anemia  -getting transfused, getting IVF  -hold losartan  -hold torsemide    Acute blood loss anemia on Chronic anemia  -Hemoglobin stable at baseline 9.1, down to 6.6 today, post-transfusion 8.3  -1 unit prbc transfused 10/21    Hypothyroidism  -Continue home Synthroid    Paroxysmal SVT/AVNRT  -Continue home metoprolol, Cardiac evaluation prior to surgery  -TTE appreciated without significant contraindication for surgery.  Does have aortic stenosis, Moderate    Hypertension  -Not well controlled as above goal however unable to take morning p.o. medications  -IV hydralazine as needed added with parameters  -Continue PTA nifedipine 90 mg daily, losartan 100 mg daily,  -1 dose of IV metoprolol given for blood pressure control  -Restart nifedipine 90 mg  -Hold losartan due to JEFFREY    Restart UTI PPx with cephalexin 250mg     Diet: Advance Diet as Tolerated: Regular Diet Adult    DVT Prophylaxis: Defer to surgical team  Boo Catheter: PRESENT, indication: Other (Comment) (order per PACU)  Central Lines:  None  Code Status: No CPR- Do NOT Intubate     Disposition Plan   Expected discharge: Anticipate 2 to 3 days with discharge to TCU for therapy     The patient's care was discussed with the Patient.    Ochoa Sosa MD  Hospitalist Service  [facility]  Securely message with the Vocera Web Console (learn more here)  Text page via [employer] Paging/Directory    This note was written with the Dragon audio recording device, any spelling or grammatical errors are unintentional.    ______________________________________________________________________    Interval History   Patient seen today pleasantly demented.  Not in any pain.  No shortness of breath no nausea, no vomiting, no diarrhea.  No abdominal pain.  Appears to be telling solid foods.    The 10 point Review of Systems is negative other than noted in the HPI or here.     Data reviewed today: I reviewed all medications, new labs and imaging results over the last 24 hours.     Physical Exam   Vital Signs: Temp: 97.7  F (36.5  C) Temp src: Oral BP: 117/56 Pulse: 71   Resp: 18 SpO2: 94 % O2 Device: Nasal cannula Oxygen Delivery: 2 LPM  Weight: 130 lbs 0 oz    Physical Examination:   General appearance - alert, well appearing, and in no distress on 2 L nasal cannula  Mental status - alert, disoriented to person place and time  HEENT - sclera anicteric, left eye normal, right eye normal, hard of hearing  Neck - supple, no significant adenopathy  Respiratory - clear to auscultation, no wheezes, rales or rhonchi,  Cardiac - normal rate, regular rhythm, normal S1, S2, systolic murmur grade 3  Abdomen - soft, nontender, nondistended,  Neurological - alert, oriented, normal speech, no focal findings or movement disorder noted  Musculoskeletal - no joint tenderness, right arm in sling, right lower extremity normal in appearance without tenderness on range of motion  Extremities - peripheral pulses normal, no pedal edema  Skin - normal coloration and  turgor, no rashes, no suspicious skin lesions noted      Data   Recent Labs   Lab 10/21/21  0532 10/20/21  1843 10/20/21  0548 10/19/21  1956 10/19/21  1956   WBC 16.1*  --  10.5  --  11.3*   HGB 6.6*  --  9.1*  --  10.9*   MCV 94  --  92  --  89     --  196  --  263    137 137   < > 136   POTASSIUM 4.2 4.0 4.0   < > 4.1   CHLORIDE 111* 109* 106   < > 104   CO2 20* 20* 23   < > 21*   BUN 27 19 22   < > 25   CR 1.52* 0.96 1.01   < > 1.27*   ANIONGAP 9 8 8   < > 11   KIN 8.9 9.0 9.5   < > 10.2   * 177* 186*   < > 141*   ALBUMIN  --   --  3.6  --   --    PROTTOTAL  --   --  6.0  --   --    BILITOTAL  --   --  0.3  --   --    ALKPHOS  --   --  90  --   --    ALT  --   --  12  --   --    AST  --   --  17  --   --     < > = values in this interval not displayed.         Recent Results (from the past 24 hour(s))   Echocardiogram Complete   Result Value    LVEF  60-65%    Narrative    [id number]  [id number]  [id number]  [id number]^LEONARDA^DAVIAN^[place] [address]     Name: SARAY ROBERT  MRN: [id number]  : 1928  Study Date: 10/20/2021 11:26 AM  Age: 89 yrs  Gender: Female  Patient Location: [facility]  Reason For Study: Aortic Valve Disorder  Ordering Physician: DAVIAN BROWER  Performed By: ALCIDES     BSA: 1.7 m2  Height: 66 in  Weight: 130 lb  HR: 102  BP: 187/88 mmHg  ______________________________________________________________________________  ______________________________________________________________________________  Interpretation Summary     The left ventricle is normal in size.  The visual ejection fraction is 60-65%.  Normal left ventricular wall motion  The right ventricle is normal in size and function.  Moderate aortic valve calcification is present.  There is moderate aortic stenosis (aortic valve area = .75 - 1.0cm2).  ______________________________________________________________________________  Left Ventricle  The left ventricle is  normal in size. There is borderline concentric left  ventricular hypertrophy. The visual ejection fraction is 60-65%. Normal left  ventricular wall motion.     Right Ventricle  The right ventricle is normal in size and function. TAPSE is normal, which is  consistent with normal right ventricular systolic function.     Atria  The left atrium is severely dilated. The right atrium is mildly dilated.     Mitral Valve  There is mild mitral annular calcification. The mitral valve leaflets appear  thickened, but open well. There is mild (1+) mitral regurgitation.     Tricuspid Valve  The tricuspid valve is not well visualized. There is mild (1+) tricuspid  regurgitation. The right ventricular systolic pressure is approximated at  37mmHg plus the right atrial pressure.     Aortic Valve  The aortic valve is trileaflet. Moderate aortic valve calcification is  present. There is mild (1+) aortic regurgitation. There is moderate aortic  stenosis (aortic valve area = .75 - 1.0cm2).     Pulmonic Valve  The pulmonic valve is not well visualized. There is no pulmonic valvular  regurgitation. There is no pulmonic valvular stenosis.     Vessels  Normal size ascending aorta.     Pericardium  There is no pericardial effusion.     Rhythm  The rhythm was sinus tachycardia.  ______________________________________________________________________________  MMode/2D Measurements & Calculations     RVDd: 3.9 cm  IVSd: 1.1 cm  LVIDd: 4.3 cm  LVIDs: 2.9 cm  LVPWd: 1.0 cm  FS: 32.2 %  LV mass(C)d: 151.8 grams  LV mass(C)dI: 91.1 grams/m2  Ao root diam: 3.1 cm  LA dimension: 4.6 cm  asc Aorta Diam: 3.3 cm  LA/Ao: 1.5  LVOT diam: 2.1 cm  LVOT area: 3.6 cm2  LA Volume Indexed (AL/bp): 59.3 ml/m2  RWT: 0.49     Doppler Measurements & Calculations  Ao V2 max: 299.7 cm/sec  Ao max P.0 mmHg  Ao V2 mean: 210.6 cm/sec  Ao mean P.6 mmHg  Ao V2 VTI: 62.7 cm  DARREN(I,D): 1.7 cm2  DARREN(V,D): 1.6 cm2  LV V1 max P.2 mmHg  LV V1 max: 134.4 cm/sec  LV  V1 VTI: 29.3 cm  SV(LVOT): 104.5 ml  SI(LVOT): 62.7 ml/m2  PA acc time: 0.10 sec  TR max matias: 302.7 cm/sec  TR max P.6 mmHg  AV Matias Ratio (DI): 0.45  DARREN Index (cm2/m2): 1.0     ______________________________________________________________________________  Report approved by: Samuel Clement 10/20/2021 12:26 PM         XR Surgery ANTOINE Brock G/T 5 Min    Narrative    This exam was marked as non-reportable because it will not be read by a   radiologist or a [facility] non-radiologist provider.             Medications     lactated ringers 50 mL/hr (10/20/21 1921)       acetaminophen  975 mg Oral Q8H     aspirin  81 mg Oral BID     calcium carbonate  500 mg Oral BID     cephALEXin  250 mg Oral Daily     escitalopram  20 mg Oral Daily     levothyroxine  75 mcg Oral Daily     [Held by provider] losartan  100 mg Oral Daily     NIFEdipine ER OSMOTIC  90 mg Oral Daily     pantoprazole  40 mg Oral BID AC     polyethylene glycol  17 g Oral Daily     polyethylene glycol-propylene glycol  1 drop Both Eyes 4x Daily     pramipexole  0.25 mg Oral At Bedtime     rosuvastatin  10 mg Oral At Bedtime     senna-docusate  1 tablet Oral BID     sodium chloride (PF)  3 mL Intracatheter Q8H     sodium chloride (PF)  3 mL Intracatheter Q8H     [Held by provider] torsemide  5 mg Oral Daily

## 2021-10-22 ENCOUNTER — APPOINTMENT (OUTPATIENT)
Dept: PHYSICAL THERAPY | Facility: HOSPITAL | Age: 86
DRG: 481 | End: 2021-10-22
Payer: MEDICARE

## 2021-10-22 LAB
ANION GAP SERPL CALCULATED.3IONS-SCNC: 7 MMOL/L (ref 5–18)
BLD PROD TYP BPU: NORMAL
BLOOD COMPONENT TYPE: NORMAL
BUN SERPL-MCNC: 40 MG/DL (ref 8–28)
CALCIUM SERPL-MCNC: 9.4 MG/DL (ref 8.5–10.5)
CHLORIDE BLD-SCNC: 109 MMOL/L (ref 98–107)
CO2 SERPL-SCNC: 24 MMOL/L (ref 22–31)
CODING SYSTEM: NORMAL
CREAT SERPL-MCNC: 1.57 MG/DL (ref 0.6–1.1)
CROSSMATCH: NORMAL
ERYTHROCYTE [DISTWIDTH] IN BLOOD BY AUTOMATED COUNT: 14.2 % (ref 10–15)
GFR SERPL CREATININE-BSD FRML MDRD: 28 ML/MIN/1.73M2
GLUCOSE BLD-MCNC: 113 MG/DL (ref 70–125)
HCT VFR BLD AUTO: 23.1 % (ref 35–47)
HGB BLD-MCNC: 7.1 G/DL (ref 11.7–15.7)
HGB BLD-MCNC: 7.3 G/DL (ref 11.7–15.7)
ISSUE DATE AND TIME: NORMAL
MCH RBC QN AUTO: 29 PG (ref 26.5–33)
MCHC RBC AUTO-ENTMCNC: 31.6 G/DL (ref 31.5–36.5)
MCV RBC AUTO: 92 FL (ref 78–100)
PLATELET # BLD AUTO: 151 10E3/UL (ref 150–450)
POTASSIUM BLD-SCNC: 3.7 MMOL/L (ref 3.5–5)
RBC # BLD AUTO: 2.52 10E6/UL (ref 3.8–5.2)
SODIUM SERPL-SCNC: 140 MMOL/L (ref 136–145)
UNIT ABO/RH: NORMAL
UNIT NUMBER: NORMAL
UNIT STATUS: NORMAL
UNIT TYPE ISBT: 1700
WBC # BLD AUTO: 14.9 10E3/UL (ref 4–11)

## 2021-10-22 PROCEDURE — 250N000013 HC RX MED GY IP 250 OP 250 PS 637: Performed by: PHYSICIAN ASSISTANT

## 2021-10-22 PROCEDURE — 258N000002 HC RX IP 258 OP 250: Performed by: FAMILY MEDICINE

## 2021-10-22 PROCEDURE — P9016 RBC LEUKOCYTES REDUCED: HCPCS | Performed by: FAMILY MEDICINE

## 2021-10-22 PROCEDURE — 250N000009 HC RX 250: Performed by: FAMILY MEDICINE

## 2021-10-22 PROCEDURE — 82374 ASSAY BLOOD CARBON DIOXIDE: CPT | Performed by: FAMILY MEDICINE

## 2021-10-22 PROCEDURE — 97110 THERAPEUTIC EXERCISES: CPT | Mod: GP

## 2021-10-22 PROCEDURE — 86923 COMPATIBILITY TEST ELECTRIC: CPT | Performed by: FAMILY MEDICINE

## 2021-10-22 PROCEDURE — 250N000013 HC RX MED GY IP 250 OP 250 PS 637: Performed by: STUDENT IN AN ORGANIZED HEALTH CARE EDUCATION/TRAINING PROGRAM

## 2021-10-22 PROCEDURE — 97530 THERAPEUTIC ACTIVITIES: CPT | Mod: GP

## 2021-10-22 PROCEDURE — 93010 ELECTROCARDIOGRAM REPORT: CPT | Mod: HIP | Performed by: INTERNAL MEDICINE

## 2021-10-22 PROCEDURE — 250N000011 HC RX IP 250 OP 636: Performed by: FAMILY MEDICINE

## 2021-10-22 PROCEDURE — 93005 ELECTROCARDIOGRAM TRACING: CPT

## 2021-10-22 PROCEDURE — 36415 COLL VENOUS BLD VENIPUNCTURE: CPT | Performed by: FAMILY MEDICINE

## 2021-10-22 PROCEDURE — 85018 HEMOGLOBIN: CPT | Performed by: STUDENT IN AN ORGANIZED HEALTH CARE EDUCATION/TRAINING PROGRAM

## 2021-10-22 PROCEDURE — 85048 AUTOMATED LEUKOCYTE COUNT: CPT | Performed by: FAMILY MEDICINE

## 2021-10-22 PROCEDURE — 250N000013 HC RX MED GY IP 250 OP 250 PS 637: Performed by: FAMILY MEDICINE

## 2021-10-22 PROCEDURE — 99233 SBSQ HOSP IP/OBS HIGH 50: CPT | Performed by: FAMILY MEDICINE

## 2021-10-22 PROCEDURE — 36415 COLL VENOUS BLD VENIPUNCTURE: CPT | Performed by: STUDENT IN AN ORGANIZED HEALTH CARE EDUCATION/TRAINING PROGRAM

## 2021-10-22 PROCEDURE — 120N000001 HC R&B MED SURG/OB

## 2021-10-22 RX ORDER — METOPROLOL TARTRATE 1 MG/ML
5 INJECTION, SOLUTION INTRAVENOUS ONCE
Status: COMPLETED | OUTPATIENT
Start: 2021-10-22 | End: 2021-10-22

## 2021-10-22 RX ORDER — SODIUM CHLORIDE 450 MG/100ML
INJECTION, SOLUTION INTRAVENOUS CONTINUOUS
Status: ACTIVE | OUTPATIENT
Start: 2021-10-22 | End: 2021-10-22

## 2021-10-22 RX ORDER — ACETAMINOPHEN 325 MG/1
975 TABLET ORAL EVERY 8 HOURS
Qty: 90 TABLET | Refills: 1 | Status: SHIPPED | OUTPATIENT
Start: 2021-10-22 | End: 2021-11-02

## 2021-10-22 RX ADMIN — ACETAMINOPHEN 975 MG: 325 TABLET ORAL at 04:00

## 2021-10-22 RX ADMIN — POLYETHYLENE GLYCOL 3350 17 G: 17 POWDER, FOR SOLUTION ORAL at 08:43

## 2021-10-22 RX ADMIN — POLYPROPYLENE GLYCOL 400, PROPYLENE GLYCOL 1 DROP: .4; .3 LIQUID OPHTHALMIC at 17:10

## 2021-10-22 RX ADMIN — ACETAMINOPHEN 975 MG: 325 TABLET ORAL at 12:12

## 2021-10-22 RX ADMIN — ROSUVASTATIN CALCIUM 10 MG: 10 TABLET, FILM COATED ORAL at 20:48

## 2021-10-22 RX ADMIN — CALCIUM CARBONATE (ANTACID) CHEW TAB 500 MG 500 MG: 500 CHEW TAB at 20:48

## 2021-10-22 RX ADMIN — NIFEDIPINE 90 MG: 60 TABLET, FILM COATED, EXTENDED RELEASE ORAL at 08:43

## 2021-10-22 RX ADMIN — METOPROLOL TARTRATE 5 MG: 5 INJECTION INTRAVENOUS at 17:06

## 2021-10-22 RX ADMIN — ESCITALOPRAM OXALATE 20 MG: 20 TABLET ORAL at 08:44

## 2021-10-22 RX ADMIN — ASPIRIN 81 MG: 81 TABLET, COATED ORAL at 08:44

## 2021-10-22 RX ADMIN — POLYPROPYLENE GLYCOL 400, PROPYLENE GLYCOL 1 DROP: .4; .3 LIQUID OPHTHALMIC at 12:12

## 2021-10-22 RX ADMIN — PANTOPRAZOLE SODIUM 40 MG: 20 TABLET, DELAYED RELEASE ORAL at 08:43

## 2021-10-22 RX ADMIN — ASPIRIN 81 MG: 81 TABLET, COATED ORAL at 20:48

## 2021-10-22 RX ADMIN — ACETAMINOPHEN 975 MG: 325 TABLET ORAL at 20:47

## 2021-10-22 RX ADMIN — DOCUSATE SODIUM 50 MG AND SENNOSIDES 8.6 MG 1 TABLET: 8.6; 5 TABLET, FILM COATED ORAL at 08:43

## 2021-10-22 RX ADMIN — CALCIUM CARBONATE (ANTACID) CHEW TAB 500 MG 500 MG: 500 CHEW TAB at 08:44

## 2021-10-22 RX ADMIN — POLYPROPYLENE GLYCOL 400, PROPYLENE GLYCOL 1 DROP: .4; .3 LIQUID OPHTHALMIC at 08:45

## 2021-10-22 RX ADMIN — PANTOPRAZOLE SODIUM 40 MG: 20 TABLET, DELAYED RELEASE ORAL at 17:11

## 2021-10-22 RX ADMIN — PRAMIPEXOLE DIHYDROCHLORIDE 0.25 MG: 0.25 TABLET ORAL at 20:48

## 2021-10-22 RX ADMIN — SODIUM CHLORIDE: 4.5 INJECTION, SOLUTION INTRAVENOUS at 10:37

## 2021-10-22 RX ADMIN — ENOXAPARIN SODIUM 30 MG: 30 INJECTION SUBCUTANEOUS at 20:33

## 2021-10-22 RX ADMIN — CEPHALEXIN 250 MG: 250 CAPSULE ORAL at 08:44

## 2021-10-22 RX ADMIN — LEVOTHYROXINE SODIUM 75 MCG: 0.03 TABLET ORAL at 08:43

## 2021-10-22 RX ADMIN — POLYPROPYLENE GLYCOL 400, PROPYLENE GLYCOL 1 DROP: .4; .3 LIQUID OPHTHALMIC at 20:51

## 2021-10-22 RX ADMIN — DOCUSATE SODIUM 50 MG AND SENNOSIDES 8.6 MG 1 TABLET: 8.6; 5 TABLET, FILM COATED ORAL at 20:48

## 2021-10-22 ASSESSMENT — ACTIVITIES OF DAILY LIVING (ADL)
ADLS_ACUITY_SCORE: 13
ADLS_ACUITY_SCORE: 11
ADLS_ACUITY_SCORE: 13
ADLS_ACUITY_SCORE: 11
ADLS_ACUITY_SCORE: 19
ADLS_ACUITY_SCORE: 11
ADLS_ACUITY_SCORE: 19
ADLS_ACUITY_SCORE: 19
ADLS_ACUITY_SCORE: 11
ADLS_ACUITY_SCORE: 13
ADLS_ACUITY_SCORE: 13
ADLS_ACUITY_SCORE: 11
ADLS_ACUITY_SCORE: 11
ADLS_ACUITY_SCORE: 19
ADLS_ACUITY_SCORE: 11
ADLS_ACUITY_SCORE: 11
ADLS_ACUITY_SCORE: 13
ADLS_ACUITY_SCORE: 13
ADLS_ACUITY_SCORE: 19
ADLS_ACUITY_SCORE: 11

## 2021-10-22 NOTE — PLAN OF CARE
Pt alert to self. Denies pain. 2 staff assist to transfer Pt chair to bed. Tolerated fairly well. Right leg CMS+. Right arm in sling CMS+. Attempted to wean to 1L NC but desatted to 88%. Returned to 2L NC. HR tachy 110 then returned to normal. Ate few bites of dinner.

## 2021-10-22 NOTE — PROGRESS NOTES
"Orthopedic Progress Note      Assessment: 2 Days Post-Op  S/P Procedure(s):  INTERNAL FIXATION, FRACTURE, TROCHANTERIC, HIP, USING IM NAIL, USING HANA TABLE   -Right proximal humerus fracture    Plan:   - Hgb dropped this AM to 7.3, repeat Hgb at noon 7.1. Discussed with Dr. Reed, recommends repeat 1 unit of blood transfusion. Very unlikely Hgb drop is due to surgical bleeding in right hip/thigh, right hip dressing has minimal drainage, no significant edema or ecchymosis.      - PT/OT - encourage patient to be up in chair, ambulate if possible, etc.   - Weightbearing status: WBAT  With platform walker, Platform weight bearing with RUE due to proximal humerus fx.  - sling for comfort RUE  - Continue dressing checks  - pain management - continue tylenol only, seems to be doing well w/o narcotics.   - Anticoagulation: ASA 81 PO BID in addition to SCDs, kim stockings and early ambulation.  - Discharge planning: pending medical clearance, discharge planning to TCU/memory care? Will continue to monitor.       Subjective:  Exam limited due to patient's dementia. However, she denies pain and states she is comfortable.    Objective:  /65 (BP Location: Left arm)   Pulse 87   Temp 97.8  F (36.6  C) (Oral)   Resp 18   Ht 1.676 m (5' 6\")   Wt 59 kg (130 lb)   SpO2 97%   Breastfeeding No   BMI 20.98 kg/m    The patient is disoriented, dementia. Sitting in chair appears comfortable. Sleepy today.   Right shoulder is tender to palpation lateral  Proximal humerus, otherwise nontender throughout. (Right shoulder ROM not tested)  Sensation is intact right hand and  strength 5/5. Otherwise unable to assess.   Dorsiflexion and plantar flexion is intact.    Dorsalis pedis pulse intact.  Calves are soft and non-tender.   The incision is covered. Dressing has small amount of drainage proximally, spots of drainage distally.        Pertinent Labs   Lab Results: personally reviewed.   Lab Results   Component Value Date    " INR 1.02 09/02/2018     Lab Results   Component Value Date    WBC 14.9 (H) 10/22/2021    HGB 7.3 (L) 10/22/2021    HCT 23.1 (L) 10/22/2021    MCV 92 10/22/2021     10/22/2021     Lab Results   Component Value Date     10/22/2021    CO2 24 10/22/2021         Report completed by:  Kendra Alonzo PA-C, AMANDA  Date: 10/22/2021  Time: 12:39 PM    I saw and evaluated the patient on 10/22/21 at 4:40 PM. I agree with the above note.    I assessed her leg for hematoma and there is none. There are no signs of post surgical bleeding. Her dressings have minimal strikethrough.    I spoke with the medical team. They believe her blood counts are dilutional. Agree with transfusion for now. OK with changing anticoagulation per medical team discretion. Please page if there are questions or concerns.    SAMI FIERRO MD

## 2021-10-22 NOTE — PROGRESS NOTES
[facility]    Medicine Progress Note - Hospitalist Service       Date of Admission:  10/19/2021    Assessment & Plan         Patient is a 89-year-old female with a history of CKD 3, chronic anemia, hypothyroidism, paroxysmal SVT/AVNRT, CAD, hyperlipidemia, history of lumbar compression fracture that presented with fall and nondisplaced intertrochanteric fracture of the right femur and closed fracture of the proximal end of right humerus.    Nondisplaced right intertrochanteric fracture  -Planned orthopedic intervention at 1230 10/20/2021  -Regular diet  -PT/OT  -Incentive spirometry  -IV pain control    Nondisplaced right proximal humerus fracture  -Will be treated nonoperatively, nonweightbearing in the right upper extremity and forearm in the sling  -PT/OT    JEFFREY on CKD 3  -serum creatinine 1.52>1.57 essentially unchanged today, baseline 1.0-1.25.  -suspect pre-renal, prolonged NPO with HTN and now acute blood loss anemia  -transfused 1 unit prbc, getting more IVF today  -hold losartan  -hold torsemide    Acute blood loss anemia on Chronic anemia  -Hemoglobin stable at baseline 9.1, down to 6.6, post-transfusion 8.3 10/21. hgb 7.3 today  -1 unit prbc transfused 10/21    Hypothyroidism  -Continue home Synthroid    Paroxysmal SVT/AVNRT  -Cardiac evaluation prior to surgery  -TTE appreciated without significant contraindication for surgery.  Does have aortic stenosis, Moderate    Hypertension  -Not well controlled as above goal however unable to take morning p.o. medications  -IV hydralazine as needed added with parameters  -PTA nifedipine 90 mg daily, losartan 100 mg daily,  -continue nifedipine 90 mg  -Hold losartan due to JEFFREY    Hx of mild dementia  -not yet back to baseline.     Restart UTI PPx with cephalexin 250mg     Diet: Advance Diet as Tolerated: Regular Diet Adult 1/2NS 100cc/hr x 15 hrs.   DVT Prophylaxis: Defer to surgical team  Boo Catheter: PRESENT, indication:  (per  order)  Central Lines: None  Code Status: No CPR- Do NOT Intubate     Disposition Plan   Expected discharge: Anticipate 2 to 3 days with discharge to TCU for therapy     The patient's care was discussed with the Patient.    Ochoa Sosa MD  Hospitalist Service  [facility]  Securely message with the Vocera Web Console (learn more here)  Text page via [employer] Paging/Directory    This note was written with the Dragon audio recording device, any spelling or grammatical errors are unintentional.    ______________________________________________________________________    Interval History   Patient seen today little bit more wakeful.  Able to tell me that she does not have any significant right lower extremity pain or left lower extremity pain.  Does report some aching from her right upper extremity.    The 10 point Review of Systems is negative other than noted in the HPI or here.     Data reviewed today: I reviewed all medications, new labs and imaging results over the last 24 hours.     Physical Exam   Vital Signs: Temp: 97.8  F (36.6  C) Temp src: Oral BP: 133/65 Pulse: 87   Resp: 18 SpO2: 97 % O2 Device: Nasal cannula Oxygen Delivery: 2 LPM  Weight: 130 lbs 0 oz    Physical Examination:   General appearance - alert, well appearing, and in no distress on room air  Mental status - alert, disoriented to person place and time  HEENT - sclera anicteric, left eye normal, right eye normal, hard of hearing  Neck - supple, no significant adenopathy  Respiratory - clear to auscultation, no wheezes, rales or rhonchi,  Cardiac - normal rate, regular rhythm, normal S1, S2, systolic murmur grade 3  Abdomen - soft, nontender, nondistended,  Neurological - alert, oriented, normal speech, no focal findings or movement disorder noted  Musculoskeletal - no joint tenderness, right arm in sling, right lower extremity normal in appearance without tenderness on range of motion  Extremities - peripheral pulses  normal, no pedal edema  Skin - normal coloration and turgor, no rashes, no suspicious skin lesions noted      Data   Recent Labs   Lab 10/22/21  1246 10/22/21  0536 10/21/21  1322 10/21/21  0532 10/21/21  0532 10/20/21  1843 10/20/21  0548 10/20/21  0548   WBC  --  14.9*  --   --  16.1*  --   --  10.5   HGB 7.1* 7.3* 8.3*   < > 6.6*  --    < > 9.1*   MCV  --  92  --   --  94  --   --  92   PLT  --  151  --   --  165  --   --  196   NA  --  140  --   --  140 137   < > 137   POTASSIUM  --  3.7  --   --  4.2 4.0   < > 4.0   CHLORIDE  --  109*  --   --  111* 109*   < > 106   CO2  --  24  --   --  20* 20*   < > 23   BUN  --  40*  --   --  27 19   < > 22   CR  --  1.57*  --   --  1.52* 0.96   < > 1.01   ANIONGAP  --  7  --   --  9 8   < > 8   KIN  --  9.4  --   --  8.9 9.0   < > 9.5   GLC  --  113  --   --  135* 177*   < > 186*   ALBUMIN  --   --   --   --   --   --   --  3.6   PROTTOTAL  --   --   --   --   --   --   --  6.0   BILITOTAL  --   --   --   --   --   --   --  0.3   ALKPHOS  --   --   --   --   --   --   --  90   ALT  --   --   --   --   --   --   --  12   AST  --   --   --   --   --   --   --  17    < > = values in this interval not displayed.         No results found for this or any previous visit (from the past 24 hour(s)).    Medications     NaCl 100 mL/hr at 10/22/21 1037       acetaminophen  975 mg Oral Q8H     aspirin  81 mg Oral BID     calcium carbonate  500 mg Oral BID     cephALEXin  250 mg Oral Daily     escitalopram  20 mg Oral Daily     levothyroxine  75 mcg Oral Daily     [Held by provider] losartan  100 mg Oral Daily     NIFEdipine ER OSMOTIC  90 mg Oral Daily     pantoprazole  40 mg Oral BID AC     polyethylene glycol  17 g Oral Daily     polyethylene glycol-propylene glycol  1 drop Both Eyes 4x Daily     pramipexole  0.25 mg Oral At Bedtime     rosuvastatin  10 mg Oral At Bedtime     senna-docusate  1 tablet Oral BID     sodium chloride (PF)  3 mL Intracatheter Q8H     sodium chloride (PF)  3  mL Intracatheter Q8H     [Held by provider] torsemide  5 mg Oral Daily

## 2021-10-22 NOTE — PLAN OF CARE
Problem: Adult Inpatient Plan of Care  Goal: Absence of Hospital-Acquired Illness or Injury  Intervention: Identify and Manage Fall Risk  Recent Flowsheet Documentation  Taken 10/22/2021 0058 by Shakira Armijo RN  Safety Promotion/Fall Prevention: bed alarm on  Intervention: Prevent Skin Injury  Recent Flowsheet Documentation  Taken 10/22/2021 0429 by Shakira Armijo RN  Body Position: supine  Taken 10/22/2021 0058 by Shakira Armijo RN  Body Position: left     Problem: Pain Acute  Goal: Acceptable Pain Control and Functional Ability  Intervention: Prevent or Manage Pain  Recent Flowsheet Documentation  Taken 10/22/2021 0058 by Shakira Armijo, RN  Medication Review/Management: medications reviewed   Pt drowsy most of the night,scheduled tylenol effective for pain control,pt mouth breather and de-sating on 2L oxygen per NC,switched to oxy mask,right arm in sling,garza cath patent,right hip incision site dressing dry,clean and intact.

## 2021-10-22 NOTE — SIGNIFICANT EVENT
Significant Event Note    Time of event: 1630    Patient now with HR in 150s, feels regular by palpation and ascultation. No lower extremity swelling. Patient sleeping, has been hypoxic but not worsening. Only on aspirin and SCD boot for prophylaxis after surgery and has been sedentary. This could be early PE however she has been on aspirin and this would be early to have post op. Due to JEFFREY cannot do a CTA PE run. She is getting her 2nd unit of blood at present however without fever, rash, shortness of breath, diaphoresis. Do not think this is a transfusion reaction.  She does have a known history of SVT and AVNRT not currently on beta blocker. EKG showing SVT 156bpm with similar QRS and Twave abnormalities as previous ekg.     Will give 5mg IV metoprolol. Could get a VQ scan if her hypoxia worsens. Will start lovenox ppx, ortho feels it is safe to start now with no evidence of hematoma along the surgical leg.     Discussed with: bedside nurse     Ochoa Sosa MD

## 2021-10-22 NOTE — PLAN OF CARE
Problem: Adult Inpatient Plan of Care  Goal: Plan of Care Review  Outcome: Improving     Problem: Pain Acute  Goal: Acceptable Pain Control and Functional Ability  Outcome: Improving  Intervention: Prevent or Manage Pain  Recent Flowsheet Documentation  Taken 10/21/2021 2023 by Erica Argueta RN  Medication Review/Management: medications reviewed     Alert, oriented only to self. Denies pain or shortness of breath at this time.   Still on 2L oxygen per nasal cannula with oxygen sats in the low 90s. Boo in place and draining. Sling to right arm in place. Dressing to the right hip clean, dry and intact. Continue to monitor.

## 2021-10-22 NOTE — PLAN OF CARE
Problem: Adult Inpatient Plan of Care  Goal: Plan of Care Review  Outcome: No Change   Pt up in chair with assist of 2. Pt did have a drop in hgb, 7.3 this morning. Will recheck hgb. Pt continues with greg, will assess. Pt had large BM

## 2021-10-23 ENCOUNTER — APPOINTMENT (OUTPATIENT)
Dept: OCCUPATIONAL THERAPY | Facility: HOSPITAL | Age: 86
DRG: 481 | End: 2021-10-23
Payer: MEDICARE

## 2021-10-23 LAB
ALBUMIN SERPL-MCNC: 2.6 G/DL (ref 3.5–5)
ALP SERPL-CCNC: 63 U/L (ref 45–120)
ALT SERPL W P-5'-P-CCNC: <9 U/L (ref 0–45)
ANION GAP SERPL CALCULATED.3IONS-SCNC: 7 MMOL/L (ref 5–18)
AST SERPL W P-5'-P-CCNC: 15 U/L (ref 0–40)
ATRIAL RATE - MUSE: 166 BPM
BILIRUB SERPL-MCNC: 0.5 MG/DL (ref 0–1)
BUN SERPL-MCNC: 39 MG/DL (ref 8–28)
CALCIUM SERPL-MCNC: 9.3 MG/DL (ref 8.5–10.5)
CHLORIDE BLD-SCNC: 111 MMOL/L (ref 98–107)
CO2 SERPL-SCNC: 24 MMOL/L (ref 22–31)
CREAT SERPL-MCNC: 1.31 MG/DL (ref 0.6–1.1)
DIASTOLIC BLOOD PRESSURE - MUSE: NORMAL MMHG
ERYTHROCYTE [DISTWIDTH] IN BLOOD BY AUTOMATED COUNT: 14.6 % (ref 10–15)
GFR SERPL CREATININE-BSD FRML MDRD: 35 ML/MIN/1.73M2
GLUCOSE BLD-MCNC: 108 MG/DL (ref 70–125)
HCT VFR BLD AUTO: 25.6 % (ref 35–47)
HGB BLD-MCNC: 8.2 G/DL (ref 11.7–15.7)
INTERPRETATION ECG - MUSE: NORMAL
MCH RBC QN AUTO: 29.5 PG (ref 26.5–33)
MCHC RBC AUTO-ENTMCNC: 32 G/DL (ref 31.5–36.5)
MCV RBC AUTO: 92 FL (ref 78–100)
P AXIS - MUSE: NORMAL DEGREES
PLATELET # BLD AUTO: 155 10E3/UL (ref 150–450)
POTASSIUM BLD-SCNC: 3.9 MMOL/L (ref 3.5–5)
PR INTERVAL - MUSE: NORMAL MS
PROT SERPL-MCNC: 5.1 G/DL (ref 6–8)
QRS DURATION - MUSE: 98 MS
QT - MUSE: 292 MS
QTC - MUSE: 470 MS
R AXIS - MUSE: -46 DEGREES
RBC # BLD AUTO: 2.78 10E6/UL (ref 3.8–5.2)
SODIUM SERPL-SCNC: 142 MMOL/L (ref 136–145)
SYSTOLIC BLOOD PRESSURE - MUSE: NORMAL MMHG
T AXIS - MUSE: 97 DEGREES
VENTRICULAR RATE- MUSE: 156 BPM
WBC # BLD AUTO: 10.2 10E3/UL (ref 4–11)

## 2021-10-23 PROCEDURE — 85027 COMPLETE CBC AUTOMATED: CPT | Performed by: FAMILY MEDICINE

## 2021-10-23 PROCEDURE — 97535 SELF CARE MNGMENT TRAINING: CPT | Mod: GO

## 2021-10-23 PROCEDURE — 36415 COLL VENOUS BLD VENIPUNCTURE: CPT | Performed by: FAMILY MEDICINE

## 2021-10-23 PROCEDURE — 258N000002 HC RX IP 258 OP 250: Performed by: FAMILY MEDICINE

## 2021-10-23 PROCEDURE — 250N000011 HC RX IP 250 OP 636: Performed by: FAMILY MEDICINE

## 2021-10-23 PROCEDURE — 99233 SBSQ HOSP IP/OBS HIGH 50: CPT | Performed by: FAMILY MEDICINE

## 2021-10-23 PROCEDURE — 250N000013 HC RX MED GY IP 250 OP 250 PS 637: Performed by: STUDENT IN AN ORGANIZED HEALTH CARE EDUCATION/TRAINING PROGRAM

## 2021-10-23 PROCEDURE — 250N000013 HC RX MED GY IP 250 OP 250 PS 637: Performed by: FAMILY MEDICINE

## 2021-10-23 PROCEDURE — 120N000001 HC R&B MED SURG/OB

## 2021-10-23 PROCEDURE — 80053 COMPREHEN METABOLIC PANEL: CPT | Performed by: FAMILY MEDICINE

## 2021-10-23 PROCEDURE — 250N000013 HC RX MED GY IP 250 OP 250 PS 637: Performed by: PHYSICIAN ASSISTANT

## 2021-10-23 RX ORDER — SODIUM CHLORIDE 450 MG/100ML
INJECTION, SOLUTION INTRAVENOUS CONTINUOUS
Status: ACTIVE | OUTPATIENT
Start: 2021-10-23 | End: 2021-10-24

## 2021-10-23 RX ADMIN — CALCIUM CARBONATE (ANTACID) CHEW TAB 500 MG 500 MG: 500 CHEW TAB at 20:31

## 2021-10-23 RX ADMIN — POLYETHYLENE GLYCOL 3350 17 G: 17 POWDER, FOR SOLUTION ORAL at 08:40

## 2021-10-23 RX ADMIN — POLYPROPYLENE GLYCOL 400, PROPYLENE GLYCOL 1 DROP: .4; .3 LIQUID OPHTHALMIC at 20:21

## 2021-10-23 RX ADMIN — DOCUSATE SODIUM 50 MG AND SENNOSIDES 8.6 MG 1 TABLET: 8.6; 5 TABLET, FILM COATED ORAL at 08:41

## 2021-10-23 RX ADMIN — PRAMIPEXOLE DIHYDROCHLORIDE 0.25 MG: 0.25 TABLET ORAL at 20:30

## 2021-10-23 RX ADMIN — POLYPROPYLENE GLYCOL 400, PROPYLENE GLYCOL 1 DROP: .4; .3 LIQUID OPHTHALMIC at 12:38

## 2021-10-23 RX ADMIN — SODIUM CHLORIDE: 4.5 INJECTION, SOLUTION INTRAVENOUS at 09:48

## 2021-10-23 RX ADMIN — ASPIRIN 81 MG: 81 TABLET, COATED ORAL at 20:29

## 2021-10-23 RX ADMIN — ACETAMINOPHEN 975 MG: 325 TABLET ORAL at 12:37

## 2021-10-23 RX ADMIN — POLYPROPYLENE GLYCOL 400, PROPYLENE GLYCOL 1 DROP: .4; .3 LIQUID OPHTHALMIC at 17:14

## 2021-10-23 RX ADMIN — OXYCODONE HYDROCHLORIDE 5 MG: 5 TABLET ORAL at 19:28

## 2021-10-23 RX ADMIN — DOCUSATE SODIUM 50 MG AND SENNOSIDES 8.6 MG 1 TABLET: 8.6; 5 TABLET, FILM COATED ORAL at 20:30

## 2021-10-23 RX ADMIN — NIFEDIPINE 90 MG: 60 TABLET, FILM COATED, EXTENDED RELEASE ORAL at 08:41

## 2021-10-23 RX ADMIN — PANTOPRAZOLE SODIUM 40 MG: 20 TABLET, DELAYED RELEASE ORAL at 08:41

## 2021-10-23 RX ADMIN — POLYPROPYLENE GLYCOL 400, PROPYLENE GLYCOL 1 DROP: .4; .3 LIQUID OPHTHALMIC at 08:42

## 2021-10-23 RX ADMIN — PANTOPRAZOLE SODIUM 40 MG: 20 TABLET, DELAYED RELEASE ORAL at 17:11

## 2021-10-23 RX ADMIN — ESCITALOPRAM OXALATE 20 MG: 20 TABLET ORAL at 08:42

## 2021-10-23 RX ADMIN — ASPIRIN 81 MG: 81 TABLET, COATED ORAL at 08:41

## 2021-10-23 RX ADMIN — ENOXAPARIN SODIUM 30 MG: 30 INJECTION SUBCUTANEOUS at 17:11

## 2021-10-23 RX ADMIN — ROSUVASTATIN CALCIUM 10 MG: 10 TABLET, FILM COATED ORAL at 20:30

## 2021-10-23 RX ADMIN — LEVOTHYROXINE SODIUM 75 MCG: 0.03 TABLET ORAL at 08:40

## 2021-10-23 RX ADMIN — CEPHALEXIN 250 MG: 250 CAPSULE ORAL at 08:41

## 2021-10-23 RX ADMIN — ACETAMINOPHEN 975 MG: 325 TABLET ORAL at 04:43

## 2021-10-23 RX ADMIN — CALCIUM CARBONATE (ANTACID) CHEW TAB 500 MG 500 MG: 500 CHEW TAB at 08:41

## 2021-10-23 ASSESSMENT — ACTIVITIES OF DAILY LIVING (ADL)
ADLS_ACUITY_SCORE: 19
ADLS_ACUITY_SCORE: 15
ADLS_ACUITY_SCORE: 19
ADLS_ACUITY_SCORE: 15
ADLS_ACUITY_SCORE: 19
ADLS_ACUITY_SCORE: 21
ADLS_ACUITY_SCORE: 19
ADLS_ACUITY_SCORE: 15
ADLS_ACUITY_SCORE: 21
ADLS_ACUITY_SCORE: 19
ADLS_ACUITY_SCORE: 21

## 2021-10-23 NOTE — PROGRESS NOTES
"Assessment: 3 Days Post-Op  S/P Procedure(s):  INTERNAL FIXATION, FRACTURE, TROCHANTERIC, HIP, USING IM NAIL, USING HANA TABLE    R proximal humerus fx    Plan:   - Continue PT/OT - advance activities as tolerated.  - Weightbearing status: WBAT on R leg.  NWB to R arm.  - Anticoagulation: Lovenox in addition to SCDs, kim stockings and early ambulation.  - Leave dressing in place, changing only if indicated by drainage.  - Pain meds as needed.  - Discharge planning: TCU once medically stable and bed available.      Subjective: Pt is resting in bed.  Responsive to questions with only yes/no answers.  Hgb improving, 8.2 today after transfusion.      Objective:  /51 (BP Location: Left arm)   Pulse 63   Temp 97.9  F (36.6  C) (Oral)   Resp 20   Ht 1.676 m (5' 6\")   Wt 59 kg (130 lb)   SpO2 97%   Breastfeeding No   BMI 20.98 kg/m      Appears comfortable.   Dressing is C/D/I.  Minimal swelling about the hip, no significant ecchymosis.  Tolerating gentle passive ROM of hip well.  Calf is supple and non-tender.  Neurovascularly intact distally.      Pertinent Labs   Lab Results: personally reviewed.   Lab Results   Component Value Date    INR 1.02 09/02/2018     Lab Results   Component Value Date    WBC 10.2 10/23/2021    HGB 8.2 (L) 10/23/2021    HCT 25.6 (L) 10/23/2021    MCV 92 10/23/2021     10/23/2021     Lab Results   Component Value Date     10/23/2021    CO2 24 10/23/2021         Report completed by:  Brady Aviles PA-C  Date: 10/23/2021  Time: 8:03 AM    "

## 2021-10-23 NOTE — PLAN OF CARE
Problem: Adult Inpatient Plan of Care  Goal: Plan of Care Review  Outcome: No Change   Pt had recheck of hgb, 8.2. Pt has been up standing at bedside and transferring into chair. Pt taking tylenol for pain. Pt is voiding, continue monitoring. Purewick in place

## 2021-10-23 NOTE — PROGRESS NOTES
[facility]    Medicine Progress Note - Hospitalist Service       Date of Admission:  10/19/2021    Assessment & Plan         Patient is a 89-year-old female with a history of CKD 3, chronic anemia, hypothyroidism, paroxysmal SVT/AVNRT, CAD, hyperlipidemia, history of lumbar compression fracture that presented with fall and nondisplaced intertrochanteric fracture of the right femur and closed fracture of the proximal end of right humerus.    Active Problems:    Closed nondisplaced intertrochanteric fracture of right femur, initial encounter (H)    Closed fracture of proximal end of right humerus, unspecified fracture morphology, initial encounter    Acute kidney failure, unspecified (H)      Nondisplaced right intertrochanteric fracture  -S/P Internal fixation on 10/20/2021 with Dr. Reed  -Regular diet  -PT/OT  -Incentive spirometry  -IV pain control    Nondisplaced right proximal humerus fracture  -Will be treated nonoperatively, nonweightbearing in the right upper extremity and forearm in the sling  -PT/OT    JEFFREY on CKD 3  -serum creatinine 1.52>1.57>1.31 improving today with IV fluid and blood product given yesterday, baseline 1.0-1.25.  -suspect pre-renal, prolonged NPO with HTN and now acute blood loss anemia  -Continue IV fluid in addition to her p.o fluid intake, encourage p.o.  -hold losartan  -hold torsemide    Acute blood loss anemia on Chronic anemia  -Hemoglobin stable at baseline 9.1>6.6>8.3>7.3>8.2.  Likely some hemodilution as well.  -1 unit prbc transfused 10/21  -1 unit PRBC transfused 10/22    Hypothyroidism  -Continue home Synthroid    Paroxysmal SVT/AVNRT  -Heart rate up in the 150s on 10/22 responded to 1 dose of IV metoprolol 5 mg. Has been controlled since  -TTE appreciated without significant contraindication for surgery.  Does have aortic stenosis, Moderate  -Has as needed p.o. metoprolol with parameters    Hypertension  -Normotensive  -IV hydralazine as needed  added with parameters  -PTA nifedipine 90 mg daily, losartan 100 mg daily,  -continue nifedipine 90 mg  -Hold losartan due to JEFFREY and normotension    Hx of mild dementia  -not yet back to baseline.     Malnutrition risk  -Low protein and low food intake pre and post surgery  -Dietitian consult placed    Restart UTI PPx with cephalexin 250mg     Diet: Advance Diet as Tolerated: Regular Diet Adult 1/2NS 75cc/hr x 15 hrs.   DVT Prophylaxis: Defer to surgical team  Boo Catheter: Not present  Central Lines: None  Code Status: No CPR- Do NOT Intubate     Disposition Plan   Expected discharge: Anticipate 1-2 days with discharge to TCU/correction for therapy     The patient's care was discussed with the Patient.    Ochoa Sosa MD  Hospitalist Service  [facility]  Securely message with the Vocera Web Console (learn more here)  Text page via [employer] Paging/Directory    This note was written with the Dragon audio recording device, any spelling or grammatical errors are unintentional.    ______________________________________________________________________    Interval History     Patient says she loves to drink water. Denies any lower extremity pain. Pleasantly demented.    Updated Chad.    The 10 point Review of Systems is negative other than noted in the HPI or here.     Data reviewed today: I reviewed all medications, new labs and imaging results over the last 24 hours.     Physical Exam   Vital Signs: Temp: 97.9  F (36.6  C) Temp src: Oral BP: 105/51 Pulse: 63   Resp: 20 SpO2: 97 % O2 Device: Nasal cannula Oxygen Delivery: 2 LPM  Weight: 130 lbs 0 oz    Physical Examination:   General appearance - alert, well appearing, and in no distress on room air  Mental status - alert, disoriented to person place and time. Sometimes has inappropriate responses to questions.  HEENT - sclera anicteric, left eye normal, right eye normal, hard of hearing  Neck - supple, no significant adenopathy  Respiratory - clear  to auscultation, no wheezes, rales or rhonchi,  Cardiac - normal rate, regular rhythm, normal S1, S2, systolic murmur grade 3  Abdomen - soft, nontender, nondistended,  Neurological - alert,disoriented, normal speech, no focal findings or movement disorder noted  Musculoskeletal - no joint tenderness, right arm in sling, right lower extremity normal in appearance without tenderness on range of motion, good strength of lower extremities  Extremities - peripheral pulses normal, no pedal edema  Skin - normal coloration and turgor, no rashes, no suspicious skin lesions noted      Data   Recent Labs   Lab 10/23/21  0506 10/22/21  1246 10/22/21  0536 10/21/21  1322 10/21/21  0532 10/20/21  1843 10/20/21  0548   WBC 10.2  --  14.9*  --  16.1*  --  10.5   HGB 8.2* 7.1* 7.3*   < > 6.6*  --  9.1*   MCV 92  --  92  --  94  --  92     --  151  --  165  --  196     --  140  --  140   < > 137   POTASSIUM 3.9  --  3.7  --  4.2   < > 4.0   CHLORIDE 111*  --  109*  --  111*   < > 106   CO2 24  --  24  --  20*   < > 23   BUN 39*  --  40*  --  27   < > 22   CR 1.31*  --  1.57*  --  1.52*   < > 1.01   ANIONGAP 7  --  7  --  9   < > 8   KIN 9.3  --  9.4  --  8.9   < > 9.5     --  113  --  135*   < > 186*   ALBUMIN 2.6*  --   --   --   --   --  3.6   PROTTOTAL 5.1*  --   --   --   --   --  6.0   BILITOTAL 0.5  --   --   --   --   --  0.3   ALKPHOS 63  --   --   --   --   --  90   ALT <9  --   --   --   --   --  12   AST 15  --   --   --   --   --  17    < > = values in this interval not displayed.         No results found for this or any previous visit (from the past 24 hour(s)).    Medications       acetaminophen  975 mg Oral Q8H     aspirin  81 mg Oral BID     calcium carbonate  500 mg Oral BID     cephALEXin  250 mg Oral Daily     enoxaparin ANTICOAGULANT  30 mg Subcutaneous Q24H     escitalopram  20 mg Oral Daily     levothyroxine  75 mcg Oral Daily     [Held by provider] losartan  100 mg Oral Daily     NIFEdipine  ER OSMOTIC  90 mg Oral Daily     pantoprazole  40 mg Oral BID AC     polyethylene glycol  17 g Oral Daily     polyethylene glycol-propylene glycol  1 drop Both Eyes 4x Daily     pramipexole  0.25 mg Oral At Bedtime     rosuvastatin  10 mg Oral At Bedtime     senna-docusate  1 tablet Oral BID     sodium chloride (PF)  3 mL Intracatheter Q8H     sodium chloride (PF)  3 mL Intracatheter Q8H     [Held by provider] torsemide  5 mg Oral Daily

## 2021-10-23 NOTE — PLAN OF CARE
Problem: Risk for Delirium  Goal: Optimal Coping  Outcome: No Change     Problem: OT General Care Plan  Goal: Toilet Transfer/Toileting (OT)  Description: Toilet Transfer/Toileting (OT)  Outcome: No Change     Problem: Pain Acute  Goal: Acceptable Pain Control and Functional Ability  Outcome: No Change     Patient A and O with fluctuating cognition, increased confusion as shift continued. 2 liters O2 via nasal canula. Pills in apple sauce.   Patient finished one unit blood this shift. Labs in the morning.   Patient started having rapid HR, 150-160. Low BP. 97/54. No SOB, no chest pain, no fever or rash. Provider called. EKG performed. IV metoprolol given. Proved effective. HR returned to 70. Monitored HR for rest of shift. HR stayed WNL. Running 100 ml/hr NS  0.45%.

## 2021-10-23 NOTE — CONSULTS
"CLINICAL NUTRITION SERVICES - ASSESSMENT NOTE     Nutrition Prescription    RECOMMENDATIONS FOR MDs/PROVIDERS TO ORDER:  None at this time    Malnutrition Status:    Unable to determine     Recommendations already ordered by Registered Dietitian (RD):  Ensure Enlive BID  Magic cup daily    Future/Additional Recommendations:  Monitor/adjust supplements/snacks as needed     REASON FOR ASSESSMENT  Johanna Washburn is a/an 89 year old female assessed by the dietitian for Provider Order - low protein    Per chart, pt with a history of CKD 3, chronic anemia, hypothyroidism, paroxysmal SVT/AVNRT, CAD, hyperlipidemia, history of lumbar compression fracture that presented with fall and nondisplaced intertrochanteric fracture of the right femur and closed fracture of the proximal end of right humerus.    POD# 3 for hip and femur fx    NUTRITION HISTORY  Pt sleeping soundly when RD visited    CURRENT NUTRITION ORDERS  Diet: Regular  Intake/Tolerance: poor, 25% - has not ordered breakfast today    LABS  Labs reviewed  BUN 39 H, Creat 1.31 H, MD notes - Albumin 2.6 L, t, prot 5.1 L (low prot/albumin may be related to inflammatory response/blood loss)  Hgb 8.2 - received RBC transfusion yesterday      10/20/21 t. Prot 6.0 wdl, albumin 3.6 wdl (no lab indication of dehydration)    MEDICATIONS  Medications reviewed  TUMS, levothyroxine, protonix, miralax, crestor, demadex - held    ANTHROPOMETRICS  Height: 167.6 cm (5' 6\")  Most Recent Weight: 59 kg (130 lb)    IBW: 59 kg  BMI: Normal BMI  Weight History:   Wt Readings from Last 3 Encounters:   10/19/21 59 kg (130 lb)   05/05/17 54.9 kg (121 lb)   01/01/20 140 lb    Dosing Weight: 59 kg    ASSESSED NUTRITION NEEDS  Estimated Energy Needs: 1475 kcals/day (25)  Justification: Post-op  Estimated Protein Needs: 59-71 grams protein/day (1 - 1.2 grams of pro/kg)  Justification: Post-op with ckd  Estimated Fluid Needs: 1475 mL/day (1 mL/kcal), or per provider  Justification: " Maintenance    PHYSICAL FINDINGS  See malnutrition section below.  Per chart,  Surgical incision noted  GI: hypoactive BS, incontinent, Last BM 10/22/21    MALNUTRITION  % Intake: Unable to assess  % Weight Loss: Weight loss does not meet criteria with current information, if weight loss more recent may meet criteria  Subcutaneous Fat Loss: Unable to assess  Muscle Loss: Unable to assess  Fluid Accumulation/Edema: None noted  Malnutrition Diagnosis: Patient does not meet two of the established criteria necessary for diagnosing malnutrition but is at risk for malnutrition    NUTRITION DIAGNOSIS  Inadequate oral intake related to acute illness/injury as evidenced by 0-25% recent meals      INTERVENTIONS  Implementation  Medical food supplement therapy   Ensure Enlive BID - offer flavors  Magic cup daily at lunch    Goals  Patient to consume % of nutritionally adequate meals three times per day, or the equivalent with supplements/snacks.    Meet estimated nutrition needs     Monitoring/Evaluation  Progress toward goals will be monitored and evaluated per protocol.

## 2021-10-23 NOTE — PLAN OF CARE
Problem: Pain Acute  Goal: Acceptable Pain Control and Functional Ability  Outcome: Improving  Intervention: Prevent or Manage Pain  Recent Flowsheet Documentation  Taken 10/23/2021 0026 by Sal De La Cruz RN  Medication Review/Management: medications reviewed   Slept all shift, Tylenol given for pain.  Temp: 97.9  F (36.6  C) Temp src: Oral BP: 105/51 Pulse: 63   Resp: 20 SpO2: 97 % O2 Device: Nasal cannula Oxygen Delivery: 2 LPM

## 2021-10-24 ENCOUNTER — APPOINTMENT (OUTPATIENT)
Dept: OCCUPATIONAL THERAPY | Facility: HOSPITAL | Age: 86
DRG: 481 | End: 2021-10-24
Payer: MEDICARE

## 2021-10-24 ENCOUNTER — APPOINTMENT (OUTPATIENT)
Dept: PHYSICAL THERAPY | Facility: HOSPITAL | Age: 86
DRG: 481 | End: 2021-10-24
Payer: MEDICARE

## 2021-10-24 LAB
CREAT SERPL-MCNC: 0.9 MG/DL (ref 0.6–1.1)
GFR SERPL CREATININE-BSD FRML MDRD: 55 ML/MIN/1.73M2

## 2021-10-24 PROCEDURE — 250N000013 HC RX MED GY IP 250 OP 250 PS 637: Performed by: HOSPITALIST

## 2021-10-24 PROCEDURE — 82565 ASSAY OF CREATININE: CPT | Performed by: FAMILY MEDICINE

## 2021-10-24 PROCEDURE — 120N000001 HC R&B MED SURG/OB

## 2021-10-24 PROCEDURE — 250N000013 HC RX MED GY IP 250 OP 250 PS 637: Performed by: FAMILY MEDICINE

## 2021-10-24 PROCEDURE — 97110 THERAPEUTIC EXERCISES: CPT | Mod: GP

## 2021-10-24 PROCEDURE — 250N000013 HC RX MED GY IP 250 OP 250 PS 637: Performed by: STUDENT IN AN ORGANIZED HEALTH CARE EDUCATION/TRAINING PROGRAM

## 2021-10-24 PROCEDURE — 250N000013 HC RX MED GY IP 250 OP 250 PS 637: Performed by: PHYSICIAN ASSISTANT

## 2021-10-24 PROCEDURE — 99232 SBSQ HOSP IP/OBS MODERATE 35: CPT | Performed by: FAMILY MEDICINE

## 2021-10-24 PROCEDURE — 250N000011 HC RX IP 250 OP 636: Performed by: FAMILY MEDICINE

## 2021-10-24 PROCEDURE — 36415 COLL VENOUS BLD VENIPUNCTURE: CPT | Performed by: FAMILY MEDICINE

## 2021-10-24 PROCEDURE — 97535 SELF CARE MNGMENT TRAINING: CPT | Mod: GO

## 2021-10-24 RX ORDER — OLANZAPINE 10 MG/2ML
2.5 INJECTION, POWDER, FOR SOLUTION INTRAMUSCULAR EVERY 6 HOURS PRN
Status: DISCONTINUED | OUTPATIENT
Start: 2021-10-24 | End: 2021-10-28 | Stop reason: HOSPADM

## 2021-10-24 RX ORDER — BENZTROPINE MESYLATE 0.5 MG/1
1 TABLET ORAL 3 TIMES DAILY PRN
Status: DISCONTINUED | OUTPATIENT
Start: 2021-10-24 | End: 2021-10-28 | Stop reason: HOSPADM

## 2021-10-24 RX ADMIN — POLYPROPYLENE GLYCOL 400, PROPYLENE GLYCOL 1 DROP: .4; .3 LIQUID OPHTHALMIC at 18:34

## 2021-10-24 RX ADMIN — Medication 12.5 MG: at 22:54

## 2021-10-24 RX ADMIN — PRAMIPEXOLE DIHYDROCHLORIDE 0.25 MG: 0.25 TABLET ORAL at 20:57

## 2021-10-24 RX ADMIN — DOCUSATE SODIUM 50 MG AND SENNOSIDES 8.6 MG 1 TABLET: 8.6; 5 TABLET, FILM COATED ORAL at 08:34

## 2021-10-24 RX ADMIN — ASPIRIN 81 MG: 81 TABLET, COATED ORAL at 20:57

## 2021-10-24 RX ADMIN — POLYPROPYLENE GLYCOL 400, PROPYLENE GLYCOL 1 DROP: .4; .3 LIQUID OPHTHALMIC at 12:17

## 2021-10-24 RX ADMIN — POLYPROPYLENE GLYCOL 400, PROPYLENE GLYCOL 1 DROP: .4; .3 LIQUID OPHTHALMIC at 21:38

## 2021-10-24 RX ADMIN — DOCUSATE SODIUM 50 MG AND SENNOSIDES 8.6 MG 1 TABLET: 8.6; 5 TABLET, FILM COATED ORAL at 20:57

## 2021-10-24 RX ADMIN — Medication 1 MG: at 22:36

## 2021-10-24 RX ADMIN — ASPIRIN 81 MG: 81 TABLET, COATED ORAL at 08:37

## 2021-10-24 RX ADMIN — ESCITALOPRAM OXALATE 20 MG: 20 TABLET ORAL at 08:34

## 2021-10-24 RX ADMIN — ROSUVASTATIN CALCIUM 10 MG: 10 TABLET, FILM COATED ORAL at 20:57

## 2021-10-24 RX ADMIN — NIFEDIPINE 90 MG: 60 TABLET, FILM COATED, EXTENDED RELEASE ORAL at 08:37

## 2021-10-24 RX ADMIN — CALCIUM CARBONATE (ANTACID) CHEW TAB 500 MG 500 MG: 500 CHEW TAB at 08:34

## 2021-10-24 RX ADMIN — POLYPROPYLENE GLYCOL 400, PROPYLENE GLYCOL 1 DROP: .4; .3 LIQUID OPHTHALMIC at 08:37

## 2021-10-24 RX ADMIN — POLYETHYLENE GLYCOL 3350 17 G: 17 POWDER, FOR SOLUTION ORAL at 08:34

## 2021-10-24 RX ADMIN — ENOXAPARIN SODIUM 30 MG: 30 INJECTION SUBCUTANEOUS at 18:35

## 2021-10-24 RX ADMIN — PANTOPRAZOLE SODIUM 40 MG: 20 TABLET, DELAYED RELEASE ORAL at 16:27

## 2021-10-24 RX ADMIN — CEPHALEXIN 250 MG: 250 CAPSULE ORAL at 08:36

## 2021-10-24 RX ADMIN — PANTOPRAZOLE SODIUM 40 MG: 20 TABLET, DELAYED RELEASE ORAL at 06:27

## 2021-10-24 RX ADMIN — CALCIUM CARBONATE (ANTACID) CHEW TAB 500 MG 500 MG: 500 CHEW TAB at 20:57

## 2021-10-24 RX ADMIN — LEVOTHYROXINE SODIUM 75 MCG: 0.03 TABLET ORAL at 08:36

## 2021-10-24 ASSESSMENT — ACTIVITIES OF DAILY LIVING (ADL)
ADLS_ACUITY_SCORE: 21
ADLS_ACUITY_SCORE: 15
ADLS_ACUITY_SCORE: 21
ADLS_ACUITY_SCORE: 15
ADLS_ACUITY_SCORE: 21
ADLS_ACUITY_SCORE: 15
ADLS_ACUITY_SCORE: 21
ADLS_ACUITY_SCORE: 15
ADLS_ACUITY_SCORE: 21
ADLS_ACUITY_SCORE: 15

## 2021-10-24 NOTE — PLAN OF CARE
Problem: Adult Inpatient Plan of Care  Goal: Plan of Care Review  Outcome: No Change   Pt has been up to chair. Pt has purewick in place. Pt is forgetful, little hesitant on giving any narcotics.

## 2021-10-24 NOTE — PROGRESS NOTES
[facility]    Medicine Progress Note - Hospitalist Service       Date of Admission:  10/19/2021    Assessment & Plan         Patient is a 89-year-old female with a history of CKD 3, chronic anemia, hypothyroidism, paroxysmal SVT/AVNRT, CAD, hyperlipidemia, history of lumbar compression fracture that presented with fall and nondisplaced intertrochanteric fracture of the right femur and closed fracture of the proximal end of right humerus.    Active Problems:    Paroxysmal SVT (supraventricular tachycardia) (H)    Closed nondisplaced intertrochanteric fracture of right femur, initial encounter (H)    Closed fracture of proximal end of right humerus, unspecified fracture morphology, initial encounter    Acute kidney failure, unspecified (H)      Nondisplaced right intertrochanteric fracture  -S/P Internal fixation on 10/20/2021 with Dr. Reed  -Regular diet  -PT/OT  -Push Incentive spirometry  -still on 2L NC post op, likely atelectasis from lack of ambulation. Can go to TCU with oxygen.  -encourage OOB  -IV pain control    Nondisplaced right proximal humerus fracture  -Will be treated nonoperatively, nonweightbearing in the right upper extremity and forearm in the sling  -PT/OT    JEFFREY on CKD 3  -serum creatinine 1.52>1.57>1.31>0.9. prerenal, resolved with IV fluid and blood product given yesterday, baseline 1.0-1.25.  -Continue IV fluid in addition to her p.o fluid intake, encourage p.o.  -hold losartan  -hold torsemide    Acute blood loss anemia on Chronic anemia  -Hemoglobin stable at baseline 9.1>6.6>8.3>7.3>8.2.  Likely some hemodilution as well.  -1 unit prbc transfused 10/21  -1 unit PRBC transfused 10/22    Hypothyroidism  -Continue home Synthroid    Paroxysmal SVT/AVNRT  -Heart rate up in the 150s on 10/22 responded to 1 dose of IV metoprolol 5 mg. Has been controlled since  -TTE appreciated without significant contraindication for surgery.  Does have aortic stenosis, Moderate  -Has  as needed p.o. metoprolol with parameters    Hypertension  -Normotensive  -IV hydralazine as needed added with parameters  -PTA nifedipine 90 mg daily, losartan 100 mg daily,  -continue nifedipine 90 mg  -Hold losartan due to JEFFREY and normotension    Hx of mild dementia  -not yet back to baseline.     Malnutrition risk  -Low protein and low food intake pre and post surgery  -Dietitian consult placed    Restart UTI PPx with cephalexin 250mg     Diet: Advance Diet as Tolerated: Regular Diet Adult  Snacks/Supplements Adult: Ensure Enlive; With Meals  Snacks/Supplements Adult: Magic Cup; With Meals 1/2NS 75cc/hr x 15 hrs.   DVT Prophylaxis: Defer to surgical team  Boo Catheter: Not present  Central Lines: None  Code Status: No CPR- Do NOT Intubate     Disposition Plan   Expected discharge: Anticipate 1-2 days with discharge to TCU/FCI for therapy.  Care management does not yet have a bed for her.  But is medically ready for discharge     The patient's care was discussed with the Patient.    Ochoa Sosa MD  Hospitalist Service  [facility]  Securely message with the [employer]oc[employer] Web Console (learn more here)  Text page via [employer] Paging/Directory    This note was written with the Dragon audio recording device, any spelling or grammatical errors are unintentional.    ______________________________________________________________________    Interval History     Patient seen sitting up in chair.  Eating breakfast independently.  Says she feels good.  No pain no shortness of breath.  Says she eats her breakfast slowly.  Does not like the eggs.    The 10 point Review of Systems is negative other than noted in the HPI or here.     Data reviewed today: I reviewed all medications, new labs and imaging results over the last 24 hours.     Physical Exam   Vital Signs: Temp: 97.8  F (36.6  C) Temp src: Oral BP: 134/61 Pulse: 73   Resp: 18 SpO2: 95 % O2 Device: Nasal cannula Oxygen Delivery: 2 LPM  Weight: 130  lbs 0 oz    Physical Examination:   General appearance - alert, well appearing, and in no distress on 2L NC  Mental status - alert, disoriented to person place and time. Sometimes has inappropriate responses to questions.  HEENT - sclera anicteric, left eye normal, right eye normal, hard of hearing  Respiratory - clear to auscultation, no wheezes, rales or rhonchi,  Cardiac - normal rate, regular rhythm, normal S1, S2, systolic murmur grade 3  Abdomen - soft, nontender, nondistended,  Neurological - alert,disoriented, normal speech, no focal findings or movement disorder noted  Musculoskeletal - no joint tenderness, right arm in sling, right lower extremity normal in appearance without tenderness on range of motion, good strength of lower extremities  Extremities - peripheral pulses normal, no pedal edema  Skin - normal coloration and turgor, no rashes, no suspicious skin lesions noted      Data   Recent Labs   Lab 10/24/21  0559 10/23/21  0506 10/22/21  1246 10/22/21  0536 10/21/21  1322 10/21/21  0532 10/20/21  1843 10/20/21  0548   WBC  --  10.2  --  14.9*  --  16.1*  --  10.5   HGB  --  8.2* 7.1* 7.3*   < > 6.6*  --  9.1*   MCV  --  92  --  92  --  94  --  92   PLT  --  155  --  151  --  165  --  196   NA  --  142  --  140  --  140   < > 137   POTASSIUM  --  3.9  --  3.7  --  4.2   < > 4.0   CHLORIDE  --  111*  --  109*  --  111*   < > 106   CO2  --  24  --  24  --  20*   < > 23   BUN  --  39*  --  40*  --  27   < > 22   CR 0.90 1.31*  --  1.57*  --  1.52*   < > 1.01   ANIONGAP  --  7  --  7  --  9   < > 8   KIN  --  9.3  --  9.4  --  8.9   < > 9.5   GLC  --  108  --  113  --  135*   < > 186*   ALBUMIN  --  2.6*  --   --   --   --   --  3.6   PROTTOTAL  --  5.1*  --   --   --   --   --  6.0   BILITOTAL  --  0.5  --   --   --   --   --  0.3   ALKPHOS  --  63  --   --   --   --   --  90   ALT  --  <9  --   --   --   --   --  12   AST  --  15  --   --   --   --   --  17    < > = values in this interval not  displayed.         No results found for this or any previous visit (from the past 24 hour(s)).    Medications       aspirin  81 mg Oral BID     calcium carbonate  500 mg Oral BID     cephALEXin  250 mg Oral Daily     enoxaparin ANTICOAGULANT  30 mg Subcutaneous Q24H     escitalopram  20 mg Oral Daily     levothyroxine  75 mcg Oral Daily     [Held by provider] losartan  100 mg Oral Daily     NIFEdipine ER OSMOTIC  90 mg Oral Daily     pantoprazole  40 mg Oral BID AC     polyethylene glycol  17 g Oral Daily     polyethylene glycol-propylene glycol  1 drop Both Eyes 4x Daily     pramipexole  0.25 mg Oral At Bedtime     rosuvastatin  10 mg Oral At Bedtime     senna-docusate  1 tablet Oral BID     sodium chloride (PF)  3 mL Intracatheter Q8H     sodium chloride (PF)  3 mL Intracatheter Q8H     [Held by provider] torsemide  5 mg Oral Daily

## 2021-10-24 NOTE — PROGRESS NOTES
Spoke to son Chad via telephone. Told him we need more TCU choices. Chad is agreeable to additional referrals being sent to the [facility]/[place] area. Would like to check with Olga Daniels again tomorrow also.

## 2021-10-24 NOTE — PROGRESS NOTES
ORTHOPEDIC L/E PROGRESS NOTE  Procedure(s):  INTERNAL FIXATION, FRACTURE, TROCHANTERIC, HIP, USING IM NAIL, USING HANA TABLE on 10/19/2021 - 10/20/2021.   Also has right proximal humerus fracture   Plan is for discharge to TCU tomorrow.  Has had several units of blood and seems to have stabilized somewhat.    4 Days Post-Op    PAIN: mild  NAUSEA: no    Temp:  [97.5  F (36.4  C)-97.8  F (36.6  C)] 97.5  F (36.4  C)  Pulse:  [73-77] 76  Resp:  [18-20] 18  BP: (120-135)/(59-62) 135/62  SpO2:  [94 %-95 %] 94 %    Denies chest pain and shortness of breath.     Lab Results   Component Value Date    HGB 8.2 (L) 10/23/2021    HGB 7.1 (L) 10/22/2021    HGB 7.3 (L) 10/22/2021    INR 1.02 09/02/2018       EXAM   The patient is oriented to person only.  calves are soft and non-tender.   Sensation is intact.  Dorsiflexion and plantar flexion is intact.  The incision is covered. Dressing C/D/I  Seems to be neurovascularly intact right upper extremity.  Able to wiggle fingers.  Sensation grossly intact to light touch.  Radial pulses palpable.  Nontender the level of the elbow.  ASSESSMENT  POD #4 s/p Procedure(s):  INTERNAL FIXATION, FRACTURE, TROCHANTERIC, HIP, USING IM NAIL, USING HANA TABLE  Also has right humerus fracture.  This is limiting her mobility somewhat.    PLAN  Anticoagulation: Lovenox and SCDs, JANICE stockings and ambulation  Leave dressing intact only change if saturated.  Discharge to TCU tomorrow if medically stable.  Weightbearing as tolerated right leg.  Nonweightbearing right arm.  Continue with PT, OT.        Kerrie Franco MD, DO  [facility]

## 2021-10-24 NOTE — PLAN OF CARE
Problem: Risk for Delirium  Goal: Improved Behavioral Control  Outcome: No Change     Problem: Risk for Delirium  Goal: Optimal Coping  Outcome: No Change     Problem: Pain Acute  Goal: Acceptable Pain Control and Functional Ability  Outcome: No Change     Patient A and O to self only. No outward behaviors, only confusion hat increased as shift went on. Dressing on right hip with old drainage.  Patient incontinent of bowel and bladder. Pattie wick in place. Patient bladder scanned after poor out put this shift. 546 cc.  Straight cath using sterile technique removed 425 cc straw colored urine. Patient tolerated procedure well.   Patient verbalized pain of 7/10. Oxy given. Proved effective.

## 2021-10-25 ENCOUNTER — APPOINTMENT (OUTPATIENT)
Dept: OCCUPATIONAL THERAPY | Facility: HOSPITAL | Age: 86
DRG: 481 | End: 2021-10-25
Payer: MEDICARE

## 2021-10-25 ENCOUNTER — APPOINTMENT (OUTPATIENT)
Dept: PHYSICAL THERAPY | Facility: HOSPITAL | Age: 86
DRG: 481 | End: 2021-10-25
Payer: MEDICARE

## 2021-10-25 LAB
GLUCOSE BLDC GLUCOMTR-MCNC: 105 MG/DL (ref 70–99)
GLUCOSE BLDC GLUCOMTR-MCNC: 121 MG/DL (ref 70–99)
SARS-COV-2 RNA RESP QL NAA+PROBE: NEGATIVE

## 2021-10-25 PROCEDURE — 97530 THERAPEUTIC ACTIVITIES: CPT | Mod: GP | Performed by: PHYSICAL THERAPIST

## 2021-10-25 PROCEDURE — 250N000011 HC RX IP 250 OP 636: Performed by: FAMILY MEDICINE

## 2021-10-25 PROCEDURE — 97110 THERAPEUTIC EXERCISES: CPT | Mod: GP | Performed by: PHYSICAL THERAPIST

## 2021-10-25 PROCEDURE — 99233 SBSQ HOSP IP/OBS HIGH 50: CPT | Performed by: INTERNAL MEDICINE

## 2021-10-25 PROCEDURE — U0005 INFEC AGEN DETEC AMPLI PROBE: HCPCS | Performed by: INTERNAL MEDICINE

## 2021-10-25 PROCEDURE — 120N000001 HC R&B MED SURG/OB

## 2021-10-25 PROCEDURE — 97535 SELF CARE MNGMENT TRAINING: CPT | Mod: GO

## 2021-10-25 PROCEDURE — 250N000013 HC RX MED GY IP 250 OP 250 PS 637: Performed by: STUDENT IN AN ORGANIZED HEALTH CARE EDUCATION/TRAINING PROGRAM

## 2021-10-25 PROCEDURE — 250N000013 HC RX MED GY IP 250 OP 250 PS 637: Performed by: FAMILY MEDICINE

## 2021-10-25 PROCEDURE — 250N000013 HC RX MED GY IP 250 OP 250 PS 637: Performed by: INTERNAL MEDICINE

## 2021-10-25 PROCEDURE — 250N000011 HC RX IP 250 OP 636: Performed by: INTERNAL MEDICINE

## 2021-10-25 PROCEDURE — 250N000013 HC RX MED GY IP 250 OP 250 PS 637: Performed by: PHYSICIAN ASSISTANT

## 2021-10-25 RX ORDER — ACETAMINOPHEN 325 MG/1
975 TABLET ORAL
Status: DISCONTINUED | OUTPATIENT
Start: 2021-10-25 | End: 2021-10-28 | Stop reason: HOSPADM

## 2021-10-25 RX ADMIN — PANTOPRAZOLE SODIUM 40 MG: 20 TABLET, DELAYED RELEASE ORAL at 17:19

## 2021-10-25 RX ADMIN — LOSARTAN POTASSIUM 100 MG: 25 TABLET, FILM COATED ORAL at 10:16

## 2021-10-25 RX ADMIN — ACETAMINOPHEN 975 MG: 325 TABLET ORAL at 13:08

## 2021-10-25 RX ADMIN — CALCIUM CARBONATE (ANTACID) CHEW TAB 500 MG 500 MG: 500 CHEW TAB at 20:51

## 2021-10-25 RX ADMIN — POLYPROPYLENE GLYCOL 400, PROPYLENE GLYCOL 1 DROP: .4; .3 LIQUID OPHTHALMIC at 17:24

## 2021-10-25 RX ADMIN — LEVOTHYROXINE SODIUM 75 MCG: 0.03 TABLET ORAL at 10:16

## 2021-10-25 RX ADMIN — POLYPROPYLENE GLYCOL 400, PROPYLENE GLYCOL 1 DROP: .4; .3 LIQUID OPHTHALMIC at 13:08

## 2021-10-25 RX ADMIN — DOCUSATE SODIUM 50 MG AND SENNOSIDES 8.6 MG 1 TABLET: 8.6; 5 TABLET, FILM COATED ORAL at 20:51

## 2021-10-25 RX ADMIN — POLYETHYLENE GLYCOL 3350 17 G: 17 POWDER, FOR SOLUTION ORAL at 10:15

## 2021-10-25 RX ADMIN — HYDRALAZINE HYDROCHLORIDE 5 MG: 20 INJECTION INTRAMUSCULAR; INTRAVENOUS at 09:54

## 2021-10-25 RX ADMIN — ACETAMINOPHEN 975 MG: 325 TABLET ORAL at 17:19

## 2021-10-25 RX ADMIN — POLYPROPYLENE GLYCOL 400, PROPYLENE GLYCOL 1 DROP: .4; .3 LIQUID OPHTHALMIC at 10:20

## 2021-10-25 RX ADMIN — ESCITALOPRAM OXALATE 20 MG: 20 TABLET ORAL at 10:16

## 2021-10-25 RX ADMIN — PANTOPRAZOLE SODIUM 40 MG: 20 TABLET, DELAYED RELEASE ORAL at 06:55

## 2021-10-25 RX ADMIN — NIFEDIPINE 90 MG: 60 TABLET, FILM COATED, EXTENDED RELEASE ORAL at 10:15

## 2021-10-25 RX ADMIN — CEPHALEXIN 250 MG: 250 CAPSULE ORAL at 10:16

## 2021-10-25 RX ADMIN — ENOXAPARIN SODIUM 40 MG: 40 INJECTION SUBCUTANEOUS at 17:20

## 2021-10-25 RX ADMIN — ROSUVASTATIN CALCIUM 10 MG: 10 TABLET, FILM COATED ORAL at 20:51

## 2021-10-25 RX ADMIN — PRAMIPEXOLE DIHYDROCHLORIDE 0.25 MG: 0.25 TABLET ORAL at 20:51

## 2021-10-25 RX ADMIN — ASPIRIN 81 MG: 81 TABLET, COATED ORAL at 10:16

## 2021-10-25 RX ADMIN — DOCUSATE SODIUM 50 MG AND SENNOSIDES 8.6 MG 1 TABLET: 8.6; 5 TABLET, FILM COATED ORAL at 10:16

## 2021-10-25 RX ADMIN — CALCIUM CARBONATE (ANTACID) CHEW TAB 500 MG 500 MG: 500 CHEW TAB at 10:16

## 2021-10-25 RX ADMIN — POLYPROPYLENE GLYCOL 400, PROPYLENE GLYCOL 1 DROP: .4; .3 LIQUID OPHTHALMIC at 20:50

## 2021-10-25 ASSESSMENT — ACTIVITIES OF DAILY LIVING (ADL)
ADLS_ACUITY_SCORE: 15
ADLS_ACUITY_SCORE: 23
ADLS_ACUITY_SCORE: 15
ADLS_ACUITY_SCORE: 15
ADLS_ACUITY_SCORE: 23
ADLS_ACUITY_SCORE: 15
ADLS_ACUITY_SCORE: 23
ADLS_ACUITY_SCORE: 15
ADLS_ACUITY_SCORE: 17
ADLS_ACUITY_SCORE: 15

## 2021-10-25 NOTE — PROGRESS NOTES
Patient accepted at [facility] for tomorrow by Elzbieta pending repeat covid test. MD updated and will order. Attempted to call son Chad but could not get through     10:38 AM  Updated son Chad via telephone. He is agreeable to plan.

## 2021-10-25 NOTE — PROGRESS NOTES
"Orthopedic Progress Note      Assessment: 5 Days Post-Op  S/P Procedure(s):  INTERNAL FIXATION, FRACTURE, TROCHANTERIC, HIP, USING IM NAIL, USING HANA TABLE     Plan:   - PT/OT - encourage patient to be up in chair, ambulate if possible, etc.   - Weightbearing status: WBAT With platform walker, Platform weight bearing with RUE due to proximal humerus fx.  - sling for comfort RUE  - Dressing was changed today,  Continue daily dressing checks, only change bandage if >60% saturated.   - continue pain management tylenol only, as needed.   - Anticoagulation: Lovenox in addition to SCDs, kim stockings and early ambulation. (ASA may be discontinued - I d/johnie order)   - Discharge planning: plan is to discharge to TCU tomorrow.     Subjective:  Limited.   Patient is very sleepy this morning, minimal communication.   Denies pain when queried.     Objective: limited  BP (!) (P) 211/89 (BP Location: Left arm)   Pulse (P) 72   Temp (P) 99.4  F (37.4  C) (Oral)   Resp (P) 18   Ht 1.676 m (5' 6\")   Wt 59 kg (130 lb)   SpO2 (P) 98%   Breastfeeding No   BMI 20.98 kg/m    The patient is disoriented, sleepy, slow to respond to questions. Appears comfortable.   Dorsiflexion and plantar flexion is intact.  Dorsalis pedis pulse intact.  Calves are soft and mildly tender.  Dressing was changed - incision is healing well, staples in place and edges well approximated. Mile surrounding ecchymosis, no erythema. The incision is now covered. Dressing C/D/I.    Pertinent Labs   Lab Results: personally reviewed.   Lab Results   Component Value Date    INR 1.02 09/02/2018     Lab Results   Component Value Date    WBC 10.2 10/23/2021    HGB 8.2 (L) 10/23/2021    HCT 25.6 (L) 10/23/2021    MCV 92 10/23/2021     10/23/2021     Lab Results   Component Value Date     10/23/2021    CO2 24 10/23/2021         Report completed by:  Kendra Alonzo PA-C, AMANDA  Date: 10/25/2021  Time: 10:59 AM    "

## 2021-10-25 NOTE — PLAN OF CARE
Problem: Risk for Delirium  Goal: Improved Behavioral Control  Outcome: No Change   Pt disoriented to place, time, and situation. Calling out a lot. Prn melatonin nd Seroquel given. Purewick in place. Incontinent of stool.

## 2021-10-25 NOTE — PLAN OF CARE
Patient  A/O  x 1  self  no signs  of  pain  SOB  on  oxygen  2 Liter  nasal  cannula  slept  through this  shift  Left  humerus  and  femur  fracture repositioned   Q2 hrs  compliant with cares       Problem: Adult Inpatient Plan of Care  Goal: Patient-Specific Goal (Individualized)  Outcome: Improving     Problem: Risk for Delirium  Goal: Optimal Coping  Outcome: Improving  Goal: Improved Behavioral Control  Outcome: Improving  Goal: Improved Sleep  Outcome: Improving     Problem: Risk for Delirium  Goal: Improved Behavioral Control  Outcome: Improving     Problem: Adult Inpatient Plan of Care  Goal: Absence of Hospital-Acquired Illness or Injury  Intervention: Identify and Manage Fall Risk  Recent Flowsheet Documentation  Taken 10/24/2021 2330 by Anthony Rueda RN  Safety Promotion/Fall Prevention: bed alarm on  Intervention: Prevent Skin Injury  Recent Flowsheet Documentation  Taken 10/24/2021 2330 by Anthony Rueda RN  Body Position: supine

## 2021-10-25 NOTE — PLAN OF CARE
Problem: Adult Inpatient Plan of Care  Goal: Optimal Comfort and Wellbeing  Outcome: Improving  Calm and denied pain except during surgical wound dressing change.      Problem: Adult Inpatient Plan of Care  Goal: Readiness for Transition of Care  Outcome: Improving  Cooperative with cares. Surgical incision intact.

## 2021-10-25 NOTE — PROGRESS NOTES
[facility]    PROGRESS NOTE - Hospitalist Service    Assessment and Plan    Patient is new to me, Sangita is an 89-year-old female with a past medical history of CKD 3, chronic anemia, hypothyroidism, paroxysmal SVT/AVNRT, CAD, hyperlipidemia, history of lumbar compression fracture who t presented to the ER with fall and found to have nondisplaced intertrochanteric fracture of the right femur and closed fracture of the proximal end of right humerus.  She was admitted with,    Nondisplaced right intertrochanteric fracture  - S/P mechanical fall  - Had internal fixation surgery on 10/20/2021 with Dr. Reed  - POD#5  - Postoperative orders as per orthopedics  - PT/OT evaluation, patient will need TCU  - Discontinue IV pain medication  - Still requires oxygen 1 to 2 L  - Continue incentive spirometry  - Start scheduled Tylenol for pain control  - Caution with narcotics giving patient age and dementia  - Anticoagulation with Lovenox as per orthopedics  - Repeat Covid test for TCU placement on 10/25/2021     Nondisplaced right proximal humerus fracture  - Orthopedic consult, nonsurgical Will be treated nonoperatively,   - nonweightbearing in the right upper extremity and forearm in the sling  - PT/OT evaluation     Acute kidney injury  - Baseline chronic kidney disease stage III  - Probably secondary to dehydration and poor oral intake   - Resolved with IV fluid and blood product given   - Discontinue IV fluid support  - hold losartan initially, restart now  - Continue to hold torsemide, may restart on discharge     Acute blood loss anemia   - Postoperative, baseline chronic anemia plus hemodilution from IV fluid  - Hemoglobin stable at baseline 9.1>6.6>8.3>7.3>8.2.    - S/P 1 unit prbc transfused 10/21 and another 1 unit PRBC transfused 10/22  - Stable hemoglobin after transfusion  -Continue to monitor CBC     Hypothyroidism  - Continue home Synthroid  - Check TSH     Paroxysmal SVT/AVNRT  -  Heart rate up in the 150s on 10/22  - Probably secondary to hypotension and acute blood loss anemia  - Resolved after 1 dose of IV metoprolol 5 mg. Has been controlled since  - Echo was done and showed EF of 60 to 65% with moderate aortic stenosis  - Has as needed p.o. metoprolol with parameters     Hypertension  - Normotensive  - IV hydralazine as needed added with parameters  - Resume home medication of nifedipine 90 mg daily, losartan 100 mg daily,  - Hold losartan due to JEFFREY , restart  - Continue to monitor blood pressure     Acute encephalopathy  - Metabolic, secondary to above  - Baseline dementia  - Caution with narcotics, decrease oxycodone dose to 2.5 mg  - PT/OT evaluation     Malnutrition risk  - Low protein and low food intake pre and post surgery  - Dietitian consult placed       Active Problems:    Paroxysmal SVT (supraventricular tachycardia) (H)    Closed nondisplaced intertrochanteric fracture of right femur, initial encounter (H)    Closed fracture of proximal end of right humerus, unspecified fracture morphology, initial encounter    Acute kidney failure, unspecified (H)      VTE prophylaxis:  Enoxaparin (Lovenox) SQ  DIET: Orders Placed This Encounter      Advance Diet as Tolerated: Regular Diet Adult      Disposition/Barriers to discharge: Monitor hemoglobin, TCU placement  Code Status: No CPR- Do NOT Intubate    Subjective:  Sangita is feeling better today, self oriented only.  Denies any chest pain or shortness of breath.    PHYSICAL EXAM  Vitals:    10/19/21 1937   Weight: 59 kg (130 lb)     B/P:110/52 T:99.4 P:74 R:18     Intake/Output Summary (Last 24 hours) at 10/25/2021 1217  Last data filed at 10/25/2021 0633  Gross per 24 hour   Intake 240 ml   Output 1050 ml   Net -810 ml      Body mass index is 20.98 kg/m .    Constitutional: Awake but sleepy, responding to verbal stimuli.  appears stated age  Eyes: Lids and lashes normal, pupils equal, round and reactive to light, extra ocular muscles  intact, sclera clear, conjunctiva normal  ENT: Normocephalic, without obvious abnormality, atraumatic, sinuses nontender on palpation, external ears without lesions, oral pharynx with moist mucous membranes, tonsils without erythema or exudates, gums normal and good dentition.  Respiratory: No increased work of breathing, good air exchange, clear to auscultation bilaterally, no crackles or wheezing  Cardiovascular: Normal apical impulse, regular rate and rhythm, normal S1 and S2, no S3 or S4, and no murmur noted  GI: No scars, normal bowel sounds, soft, non-distended, non-tender, no masses palpated, no hepatosplenomegally  Skin: no bruising or bleeding and normal skin color, texture, turgor  Musculoskeletal:  intact surgical incision covered with dressing.  no lower extremity pitting edema present  Neurologic: Self oriented only, no focal deficit.  Neuropsychiatric: Appropriate with examiner      PERTINENT LABS/IMAGING:  Recent Labs   Lab 10/25/21  1218 10/25/21  0911 10/24/21  0559 10/23/21  0506 10/22/21  1246 10/22/21  0536 10/22/21  0536 10/21/21  1322 10/21/21  0532 10/20/21  1843 10/20/21  0548   WBC  --   --   --  10.2  --   --  14.9*  --  16.1*  --  10.5   HGB  --   --   --  8.2* 7.1*  --  7.3*   < > 6.6*  --  9.1*   MCV  --   --   --  92  --   --  92  --  94  --  92   PLT  --   --   --  155  --   --  151  --  165  --  196   NA  --   --   --  142  --   --  140  --  140   < > 137   POTASSIUM  --   --   --  3.9  --   --  3.7  --  4.2   < > 4.0   CHLORIDE  --   --   --  111*  --   --  109*  --  111*   < > 106   CO2  --   --   --  24  --   --  24  --  20*   < > 23   BUN  --   --   --  39*  --   --  40*  --  27   < > 22   CR  --   --  0.90 1.31*  --   --  1.57*  --  1.52*   < > 1.01   ANIONGAP  --   --   --  7  --   --  7  --  9   < > 8   KIN  --   --   --  9.3  --   --  9.4  --  8.9   < > 9.5   * 105*  --  108  --    < > 113  --  135*   < > 186*   ALBUMIN  --   --   --  2.6*  --   --   --   --   --   --   3.6   PROTTOTAL  --   --   --  5.1*  --   --   --   --   --   --  6.0   BILITOTAL  --   --   --  0.5  --   --   --   --   --   --  0.3   ALKPHOS  --   --   --  63  --   --   --   --   --   --  90   ALT  --   --   --  <9  --   --   --   --   --   --  12   AST  --   --   --  15  --   --   --   --   --   --  17    < > = values in this interval not displayed.     No results found for this or any previous visit (from the past 24 hour(s)).    Discussed with patient, family, orthopedics, nursing staff and discharge planner    Christofer Sy MD  [facility] Medicine Service  [phone]

## 2021-10-26 ENCOUNTER — APPOINTMENT (OUTPATIENT)
Dept: SPEECH THERAPY | Facility: HOSPITAL | Age: 86
DRG: 481 | End: 2021-10-26
Attending: HOSPITALIST
Payer: MEDICARE

## 2021-10-26 ENCOUNTER — APPOINTMENT (OUTPATIENT)
Dept: OCCUPATIONAL THERAPY | Facility: HOSPITAL | Age: 86
DRG: 481 | End: 2021-10-26
Payer: MEDICARE

## 2021-10-26 LAB
ANION GAP SERPL CALCULATED.3IONS-SCNC: 6 MMOL/L (ref 5–18)
BUN SERPL-MCNC: 20 MG/DL (ref 8–28)
CALCIUM SERPL-MCNC: 9.5 MG/DL (ref 8.5–10.5)
CHLORIDE BLD-SCNC: 107 MMOL/L (ref 98–107)
CO2 SERPL-SCNC: 26 MMOL/L (ref 22–31)
CREAT SERPL-MCNC: 0.8 MG/DL (ref 0.6–1.1)
GFR SERPL CREATININE-BSD FRML MDRD: 64 ML/MIN/1.73M2
GLUCOSE BLD-MCNC: 104 MG/DL (ref 70–125)
HGB BLD-MCNC: 9.5 G/DL (ref 11.7–15.7)
POTASSIUM BLD-SCNC: 4.1 MMOL/L (ref 3.5–5)
SODIUM SERPL-SCNC: 139 MMOL/L (ref 136–145)
TSH SERPL DL<=0.005 MIU/L-ACNC: 0.71 UIU/ML (ref 0.3–5)

## 2021-10-26 PROCEDURE — 97110 THERAPEUTIC EXERCISES: CPT | Mod: GO

## 2021-10-26 PROCEDURE — 36415 COLL VENOUS BLD VENIPUNCTURE: CPT | Performed by: INTERNAL MEDICINE

## 2021-10-26 PROCEDURE — 250N000013 HC RX MED GY IP 250 OP 250 PS 637: Performed by: INTERNAL MEDICINE

## 2021-10-26 PROCEDURE — 99231 SBSQ HOSP IP/OBS SF/LOW 25: CPT | Performed by: HOSPITALIST

## 2021-10-26 PROCEDURE — 84443 ASSAY THYROID STIM HORMONE: CPT | Performed by: INTERNAL MEDICINE

## 2021-10-26 PROCEDURE — 97535 SELF CARE MNGMENT TRAINING: CPT | Mod: GO

## 2021-10-26 PROCEDURE — 250N000013 HC RX MED GY IP 250 OP 250 PS 637: Performed by: PHYSICIAN ASSISTANT

## 2021-10-26 PROCEDURE — 250N000013 HC RX MED GY IP 250 OP 250 PS 637: Performed by: FAMILY MEDICINE

## 2021-10-26 PROCEDURE — 120N000001 HC R&B MED SURG/OB

## 2021-10-26 PROCEDURE — 92610 EVALUATE SWALLOWING FUNCTION: CPT | Mod: GN

## 2021-10-26 PROCEDURE — 250N000013 HC RX MED GY IP 250 OP 250 PS 637: Performed by: HOSPITALIST

## 2021-10-26 PROCEDURE — 85018 HEMOGLOBIN: CPT | Performed by: INTERNAL MEDICINE

## 2021-10-26 PROCEDURE — 250N000011 HC RX IP 250 OP 636: Performed by: INTERNAL MEDICINE

## 2021-10-26 PROCEDURE — 82310 ASSAY OF CALCIUM: CPT | Performed by: INTERNAL MEDICINE

## 2021-10-26 PROCEDURE — 250N000013 HC RX MED GY IP 250 OP 250 PS 637: Performed by: STUDENT IN AN ORGANIZED HEALTH CARE EDUCATION/TRAINING PROGRAM

## 2021-10-26 RX ORDER — POLYETHYLENE GLYCOL 3350 17 G/17G
17 POWDER, FOR SOLUTION ORAL DAILY PRN
Qty: 510 G | Refills: 0 | Status: SHIPPED | OUTPATIENT
Start: 2021-10-26

## 2021-10-26 RX ORDER — MULTIVITAMIN,THERAPEUTIC
1 TABLET ORAL DAILY
Status: DISCONTINUED | OUTPATIENT
Start: 2021-10-26 | End: 2021-10-28 | Stop reason: HOSPADM

## 2021-10-26 RX ORDER — ASPIRIN 81 MG/1
81 TABLET, CHEWABLE ORAL DAILY
Status: ON HOLD | COMMUNITY
End: 2021-10-26

## 2021-10-26 RX ORDER — ASPIRIN 81 MG/1
81 TABLET, CHEWABLE ORAL DAILY
Qty: 30 TABLET | Refills: 0 | Status: SHIPPED | OUTPATIENT
Start: 2021-11-21

## 2021-10-26 RX ADMIN — LEVOTHYROXINE SODIUM 75 MCG: 0.03 TABLET ORAL at 09:09

## 2021-10-26 RX ADMIN — DOCUSATE SODIUM 50 MG AND SENNOSIDES 8.6 MG 1 TABLET: 8.6; 5 TABLET, FILM COATED ORAL at 09:10

## 2021-10-26 RX ADMIN — POLYETHYLENE GLYCOL 3350 17 G: 17 POWDER, FOR SOLUTION ORAL at 09:10

## 2021-10-26 RX ADMIN — POLYPROPYLENE GLYCOL 400, PROPYLENE GLYCOL 1 DROP: .4; .3 LIQUID OPHTHALMIC at 09:35

## 2021-10-26 RX ADMIN — ACETAMINOPHEN 650 MG: 325 TABLET ORAL at 05:15

## 2021-10-26 RX ADMIN — POLYPROPYLENE GLYCOL 400, PROPYLENE GLYCOL 1 DROP: .4; .3 LIQUID OPHTHALMIC at 12:29

## 2021-10-26 RX ADMIN — LOSARTAN POTASSIUM 100 MG: 25 TABLET, FILM COATED ORAL at 09:08

## 2021-10-26 RX ADMIN — CALCIUM CARBONATE (ANTACID) CHEW TAB 500 MG 500 MG: 500 CHEW TAB at 09:09

## 2021-10-26 RX ADMIN — CEPHALEXIN 250 MG: 250 CAPSULE ORAL at 09:09

## 2021-10-26 RX ADMIN — THERA TABS 1 TABLET: TAB at 16:48

## 2021-10-26 RX ADMIN — PRAMIPEXOLE DIHYDROCHLORIDE 0.25 MG: 0.25 TABLET ORAL at 21:06

## 2021-10-26 RX ADMIN — CALCIUM CARBONATE (ANTACID) CHEW TAB 500 MG 500 MG: 500 CHEW TAB at 21:06

## 2021-10-26 RX ADMIN — PANTOPRAZOLE SODIUM 40 MG: 20 TABLET, DELAYED RELEASE ORAL at 06:46

## 2021-10-26 RX ADMIN — NIFEDIPINE 90 MG: 60 TABLET, FILM COATED, EXTENDED RELEASE ORAL at 09:08

## 2021-10-26 RX ADMIN — TORSEMIDE 5 MG: 5 TABLET ORAL at 09:35

## 2021-10-26 RX ADMIN — ACETAMINOPHEN 975 MG: 325 TABLET ORAL at 12:28

## 2021-10-26 RX ADMIN — POLYPROPYLENE GLYCOL 400, PROPYLENE GLYCOL 1 DROP: .4; .3 LIQUID OPHTHALMIC at 16:56

## 2021-10-26 RX ADMIN — ESCITALOPRAM OXALATE 20 MG: 20 TABLET ORAL at 09:10

## 2021-10-26 RX ADMIN — DOCUSATE SODIUM 50 MG AND SENNOSIDES 8.6 MG 1 TABLET: 8.6; 5 TABLET, FILM COATED ORAL at 21:05

## 2021-10-26 RX ADMIN — ROSUVASTATIN CALCIUM 10 MG: 10 TABLET, FILM COATED ORAL at 21:05

## 2021-10-26 RX ADMIN — ACETAMINOPHEN 975 MG: 325 TABLET ORAL at 09:09

## 2021-10-26 RX ADMIN — PANTOPRAZOLE SODIUM 40 MG: 20 TABLET, DELAYED RELEASE ORAL at 16:48

## 2021-10-26 RX ADMIN — ACETAMINOPHEN 975 MG: 325 TABLET ORAL at 16:48

## 2021-10-26 RX ADMIN — ENOXAPARIN SODIUM 40 MG: 40 INJECTION SUBCUTANEOUS at 20:17

## 2021-10-26 ASSESSMENT — ACTIVITIES OF DAILY LIVING (ADL)
ADLS_ACUITY_SCORE: 21
ADLS_ACUITY_SCORE: 23
ADLS_ACUITY_SCORE: 23
ADLS_ACUITY_SCORE: 21
ADLS_ACUITY_SCORE: 23
ADLS_ACUITY_SCORE: 21
ADLS_ACUITY_SCORE: 21
ADLS_ACUITY_SCORE: 23
ADLS_ACUITY_SCORE: 23
ADLS_ACUITY_SCORE: 21
ADLS_ACUITY_SCORE: 23
ADLS_ACUITY_SCORE: 21

## 2021-10-26 ASSESSMENT — MIFFLIN-ST. JEOR: SCORE: 1017.78

## 2021-10-26 NOTE — PROGRESS NOTES
Message left for admissions at [facility] to verify they can accept patient today. Awaiting call back     10:14 AM  Elzbieta from [facility] called and said they can clinically still accept the patient but the bed she was supposed to be going into today is not available any more because the person currently in it is not discharging as planned. Says they will have 2 beds tomorrow and can accept her then. CM will attempt to find another bed for today . MD updated. Son Chad updated via telephone     12:03 PM  Message from Elzbieta at [facility] now stating they might not be able to take patient tomorrow but requests follow up in the morning. TCU referrals re-sent to other locations.

## 2021-10-26 NOTE — PLAN OF CARE
Problem: Pain Acute  Goal: Acceptable Pain Control and Functional Ability  Outcome: Improving   Pt appears to be comfortable. Medicated with scheduled tylenol. Up in chair with chayito lift.

## 2021-10-26 NOTE — PLAN OF CARE
Problem: Adult Inpatient Plan of Care  Goal: Plan of Care Review  Outcome: No Change     Problem: Pain Acute  Goal: Acceptable Pain Control and Functional Ability  Outcome: Improving       Oriented only to self. Breathing easy and even, denies pain at this time.   Dressing to the right hip intact. Incontinent of urine, with purewick in place, patent and draining.  Weak and pale looking.  Alarms intact at all times.  Discharging today to TCU @ 3pm.

## 2021-10-26 NOTE — PROGRESS NOTES
"CLINICAL NUTRITION SERVICES - REASSESSMENT NOTE     Nutrition Prescription    RECOMMENDATIONS FOR MDs/PROVIDERS TO ORDER:  None    Malnutrition Status:    Moderate    Recommendations already ordered by Registered Dietitian (RD):  Change supplements to snack times to promote intake in the setting of cognitive impairment  Mvi with minerals    Future/Additional Recommendations:  Adjust supplements pending intake/acceptance     EVALUATION OF THE PROGRESS TOWARD GOALS   Diet: Regular  Supplement: Ensure Enlive BID, Magic cup daily  Intake: Meal intake remains poor, 25% of meals.   Ordered 1239 kcal, 47 g protein yesterday  Poor fluid intake, 240-480 ml/day     Pt with confused speech. She did not recall having lunch. She states she needs to heal. Encouraged pt to eat well to promote healing.      NEW FINDINGS   2 L O2    ANTHROPOMETRICS  Height: 167.6 cm (5' 6\")  Most Recent Weight: 59 kg (130 lb)  10/19 -stated weight?  IBW: 59 kg  BMI: Normal BMI  Weight Hx:  123 lb 7/28/21  140 lb 1/1/20    PHYSICAL FINDINGS  See malnutrition section below.    GI CONCERNS  BM q other day    LABS  Reviewed    MEDICATIONS  Reviewed  Tums bid, levothyroxine, protonix, miralax daily, crestor, pericolace    MALNUTRITION  % Intake: Intake < 50% > 5 days  % Weight Loss: Weight loss does not meet criteria   Subcutaneous Fat Loss: Mild orbital  Muscle Loss: Mild to moderate temporal  Fluid Accumulation/Edema: None noted  Malnutrition Diagnosis: Moderate malnutrition in the setting of acute illness    Previous Goals   Patient to consume % of nutritionally adequate meals three times per day, or the equivalent with supplements/snacks.   Meet estimated nutrition needs  Evaluation: Not met    Previous Nutrition Diagnosis  Inadequate oral intake related to acute illness/injury as evidenced by 0-25% recent meals    Evaluation: Declining    CURRENT NUTRITION DIAGNOSIS  Inadequate oral intake related to acute illness/injury as evidenced by 0-25% " recent meals      INTERVENTIONS  Implementation  Change supplements to snack times to promote intake in the setting of cognitive impairment  Mvi with minerals    Goals  Patient to consume % of nutritionally adequate meals three times per day, or the equivalent with supplements/snacks    Monitoring/Evaluation  Progress toward goals will be monitored and evaluated per protocol.

## 2021-10-26 NOTE — PROGRESS NOTES
10/26/21 1500   General Information   Onset of Illness/Injury or Date of Surgery 10/26/21   Pertinent History of Current Problem patient is a 89 year old female admitted following a mechanical fall and history of dementia. She was noted to be coughing with liquids today per nursing.    General Observations Alert and cooperative   Type of Evaluation   Type of Evaluation Swallow Evaluation   Oral Motor   Oral Musculature generally intact   Dentition (Oral Motor)   Dentition (Oral Motor) natural dentition;adequate dentition   Facial Symmetry (Oral Motor)   Facial Symmetry (Oral Motor)   (WFL)   Comment, Facial Symmetry (Oral Motor) WFL   Lip Function (Oral Motor)   Comment, Lip Function (Oral Motor) WFL   Tongue Function (Oral Motor)   Comment, Tongue Function (Oral Motor) WFL   General Swallowing Observations   Current Diet/Method of Nutritional Intake (General Swallowing Observations, NIS) thin liquids (level 0);regular diet   Comment, General Swallowing Observations Nursing noted coughing on liquids   Swallowing Evaluation Clinical swallow evaluation   Clinical Swallow Evaluation   Feeding Assistance set up only required   Clinical Swallow Evaluation Textures Trialed thin liquids;mildly thick liquids;pureed;solid foods   Clinical Swallow Eval: Thin Liquid Texture Trial   Mode of Presentation, Thin Liquids cup;straw   Volume of Liquid or Food Presented 2 ounces   Oral Phase of Swallow WFL   Pharyngeal Phase of Swallow intact;throat clearing  (delayed throat clearing and light cough x1)   Diagnostic Statement Vague s/s aspiration intermittently, stronger symptom of aspiration x1   Clinical Swallow Eval: Mildly Thick Liquids   Mode of Presentation cup;self-fed   Volume Presented 1 oucne   Oral Phase WFL   Pharyngeal Phase intact   Diagnostic Statement WFL   Clinical Swallow Evaluation: Puree Solid Texture Trial   Mode of Presentation, Puree spoon   Volume of Puree Presented 3 bites   Oral Phase, Puree WFL    Pharyngeal Phase, Puree intact   Diagnostic Statement [facility]   Clinical Swallow Evaluation: Solid Food Texture Trial   Mode of Presentation self-fed   Volume Presented 3 bites, declined further   Oral Phase [facility]   Pharyngeal Phase intact   Diagnostic Statement Slow mastication but functional and complete   Esophageal Phase of Swallow   Patient reports or presents with symptoms of esophageal dysphagia No   Swallowing Recommendations   Diet Consistency Recommendations regular diet;mildly thick liquids (level 2)   Supervision Level for Intake distant supervision needed   Mode of Delivery Recommendations bolus size, small;slow rate of intake   Monitoring/Assistance Required (Eating/Swallowing) monitor for cough or change in vocal quality with intake   Recommended Feeding/Eating Techniques (Swallow Eval) set-up and prepare tray;patient is independent, no specific recommendations   Medication Administration Recommendations, Swallowing (SLP) per patient preference and tolerance   Instrumental Assessment Recommendations VFSS (videofluroscopic swallowing study)   Comment, Swallowing Recommendations BSS completed. Vague, inconsistent s/s aspiration with thin liquids. No overt s/s aspiration with mildly thick, puree and solids. Recommend downgrade to regular diet and mildly thick liquids. VFSS tomorrow.    General Therapy Interventions   Planned Therapy Interventions Dysphagia Treatment   Dysphagia treatment Compensatory strategies for swallowing;Modified diet education;Instruction of safe swallow strategies   Intervention Comments F/u pending VFSS results and recs   SLP Therapy Assessment/Plan   Criteria for Skilled Therapeutic Interventions Met (SLP Eval) yes;treatment indicated   SLP Diagnosis dysphagia   Rehab Potential (SLP Eval) good, to achieve stated therapy goals   Therapy Frequency (SLP Eval) 5 times/wk   Predicted Duration of Therapy Intervention (SLP Eval) 2-3 sessions   Coping Strategies   Trust Relationship/Rapport  questions answered;questions encouraged

## 2021-10-26 NOTE — PROGRESS NOTES
"Orthopedic Progress Note      Assessment: 6 Days Post-Op  S/P Procedure(s):  INTERNAL FIXATION, FRACTURE, TROCHANTERIC, HIP, USING IM NAIL, USING HANA TABLE     Plan:   - PT/OT - encourage patient to be up in chair, ambulate if possible, etc.   - Weightbearing status: WBAT With platform walker, Platform weight bearing with RUE due to proximal humerus fx.  - sling for comfort RUE  - continue dressing checks, do not change unless saturated.   - continue pain management tylenol only, as needed.   - Anticoagulation: Lovenox in addition to SCDs, kim stockings and early ambulation  - Discharge planning: was going to TCU today, now unable to. Hopefully tomorrow.          Subjective: limited - dementia   Pain: none reported, seems to be in pain with movement.     Patient is much more awake and talkative today, although conversation is disoriented. Denies pain. States she is doing fine. No complaints.       Objective:  BP (!) 187/78   Pulse 54   Temp 97.9  F (36.6  C) (Oral)   Resp 20   Ht 1.676 m (5' 6\")   Wt 59 kg (130 lb)   SpO2 94%   Breastfeeding No   BMI 20.98 kg/m    The patient is A&Ox1, name only.  Appears comfortable laying in bed. Talkative and much more alert than yesterday.   Sensation is intact.   Dorsiflexion and plantar flexion is intact.  Dorsalis pedis pulse intact.  Calves are soft and non-tender.   The incision is covered. Dressing C/D/I.      Pertinent Labs   Lab Results: personally reviewed.   Lab Results   Component Value Date    INR 1.02 09/02/2018     Lab Results   Component Value Date    WBC 10.2 10/23/2021    HGB 9.5 (L) 10/26/2021    HCT 25.6 (L) 10/23/2021    MCV 92 10/23/2021     10/23/2021     Lab Results   Component Value Date     10/26/2021    CO2 26 10/26/2021         Report completed by:  Kendra Alonzo PA-C, AMANDA  Date: 10/26/2021  Time: 12:26 PM    "

## 2021-10-26 NOTE — PLAN OF CARE
Problem: Pain Acute  Goal: Acceptable Pain Control and Functional Ability  Outcome: No Change     Problem: Risk for Delirium  Goal: Optimal Coping  Outcome: No Change     Problem: Adult Inpatient Plan of Care  Goal: Readiness for Transition of Care  Outcome: No Change     Patient A and O to self. Incontinent of bowel and bladder. Pure wick in place. Patient ate less then 50% of her dinner. No bowel movement this shift. Patient took all meds with apple sauce. [facility] assist of 2 for transfers.   Possible discharge tomorrow. To TCU.

## 2021-10-26 NOTE — PROGRESS NOTES
[facility]    Medicine Progress Note - Hospitalist Service       Date of Admission:  10/19/2021    Assessment & Plan             Johanna Washburn is a 89 year old female admitted on 10/19/2021. She has history of CKD 3, chronic anemia, hypothyroidism, paroxysmal SVT/AVNRT, CAD, hyperlipidemia, history of lumbar compression fracture and presented to the ER with fall and found to have nondisplaced intertrochanteric fracture of the right femur and closed fracture of the proximal end of right humerus. Awaiting TCU placement     Nondisplaced right intertrochanteric fracture  - S/P mechanical fall  - Had internal fixation surgery on 10/20/2021 with Dr. Reed  - 6 Days Post-Op  - Postoperative care as per orthopedics  - PT/OT following, patient will need TCU  - Continue incentive spirometry  - Continue scheduled Tylenol for pain control  - Caution with narcotics giving patient age and dementia- not needed any in few days  - Anticoagulation with Lovenox as per orthopedics, plan 30 day course     Nondisplaced right proximal humerus fracture  - Orthopedic saw, being treated nonoperatively  - nonweightbearing in the right upper extremity and forearm in the sling  - PT/OT following     Acute kidney injury on CKD 3  - Probably secondary to dehydration and poor oral intake   - Resolved with IV fluid and blood product given   - Off IV fluid support  - held losartan initially, back on now  - Resuming home torsemide today     Acute blood loss anemia   - Postoperative, baseline chronic anemia plus hemodilution from IV fluid  - Hemoglobin at baseline ~10, here 9.1>6.6>9.5  - S/P 2 units prbc here     Hypothyroidism  - Continue home Synthroid  - TSH ok 0.7- would recheck in 4 weeks, goal for elderly generally 4-6     Paroxysmal SVT/AVNRT  - Heart rate up in the 150s on 10/22  - Probably secondary to hypotension and acute blood loss anemia  - Resolved after 1 dose of IV metoprolol 5 mg. Has been controlled  since. Off tele  - Echo was done and showed EF of 60 to 65% with moderate aortic stenosis     Hypertension  - Normotensive  - IV hydralazine as needed added with parameters  - Continue home medication of nifedipine 90 mg daily, losartan 100 mg daily  - Resume home torsemide today  - Continue to monitor blood pressure     Acute encephalopathy  - Metabolic, secondary to above  - Baseline dementia  - Caution with narcotics- none needed few days     Malnutrition risk  - Low protein and low food intake pre and post surgery  - Dietitian following    Possible dysphagia  -Staff noting patient coughs with oral intake  -Speech eval       Diet: Advance Diet as Tolerated: Regular Diet Adult  Advance Diet as Tolerated  Snacks/Supplements Adult: Magic Cup; Between Meals  Snacks/Supplements Adult: Ensure Enlive; Between Meals    DVT Prophylaxis: Moderate risk. Lovenox   Boo Catheter: Not present  Central Lines: None  Code Status: No CPR- Do NOT Intubate      Disposition Plan   Disposition: TCU when bed found  Discharge barriers: placement  Medically ready to discharge today: Yes  Estimated discharge date: 10/27/2021     The patient's care was discussed with the Patient.    Aditi Crockett MD  Hospitalist Service  [facility]  Text page via [facility] Paging/Directory      Clinically Significant Risk Factors Present on Admission                ____________        Physical Exam   Vital Signs: Temp: 97.6  F (36.4  C) Temp src: Oral BP: 107/53 Pulse: 72   Resp: 20 SpO2: 96 % O2 Device: None (Room air)    Weight: 130 lbs 0 oz  General: in no apparent distress, non-toxic and alert female lying in hospital bed oriented to person and place  HEENT: Head normocephalic atraumatic, oral mucosa moist. Sclerae anicteric  CV: Irregularly irregular rhythm, normal rate, soft holosystolic murmur heard best LUSB  Resp: No wheezes, no rales or rhonchi, no focal consolidations  GI: Belly soft, nondistended, mildly tender to  palpation throughout, bowel sounds present  Skin: No rashes or lesions  Extremities: Trace ankle edema bilaterally  Psych: Normal affect, mood euthymic. Tangential  Neuro: CNII-XII grossly intact, moving all 4 extremities    Data   Recent Results (from the past 24 hour(s))   Hemoglobin    Collection Time: 10/26/21  5:48 AM   Result Value Ref Range    Hemoglobin 9.5 (L) 11.7 - 15.7 g/dL   Basic metabolic panel    Collection Time: 10/26/21  5:48 AM   Result Value Ref Range    Sodium 139 136 - 145 mmol/L    Potassium 4.1 3.5 - 5.0 mmol/L    Chloride 107 98 - 107 mmol/L    Carbon Dioxide (CO2) 26 22 - 31 mmol/L    Anion Gap 6 5 - 18 mmol/L    Urea Nitrogen 20 8 - 28 mg/dL    Creatinine 0.80 0.60 - 1.10 mg/dL    Calcium 9.5 8.5 - 10.5 mg/dL    Glucose 104 70 - 125 mg/dL    GFR Estimate 64 >60 mL/min/1.73m2   TSH    Collection Time: 10/26/21  5:48 AM   Result Value Ref Range    TSH 0.71 0.30 - 5.00 uIU/mL     ____________  Interval History   Data reviewed today: I reviewed all medications, new labs and imaging results over the last 24 hours. I personally reviewed no images or EKG's today.  Patient has no complaints today.  She is confused but pleasant.  I called her son Awais, no answer, left voicemail.

## 2021-10-26 NOTE — DISCHARGE SUMMARY
[facility]    Medicine Progress Note - Hospitalist Service       Date of Admission:  10/19/2021    Assessment & Plan           Johanna Washburn is a 89 year old female admitted on 10/19/2021. She has history of CKD 3, chronic anemia, hypothyroidism, paroxysmal SVT/AVNRT, CAD, hyperlipidemia, history of lumbar compression fracture and presented to the ER with fall and found to have nondisplaced intertrochanteric fracture of the right femur and closed fracture of the proximal end of right humerus. Awaiting TCU placement     Nondisplaced right intertrochanteric fracture  - S/P mechanical fall  - Had internal fixation surgery on 10/20/2021 with Dr. Reed  - 6 Days Post-Op  - Postoperative care as per orthopedics  - PT/OT following, patient will need TCU  - Continue incentive spirometry  - Continue scheduled Tylenol for pain control  - Caution with narcotics giving patient age and dementia- not needed any in few days  - Anticoagulation with Lovenox as per orthopedics, plan 30 day course     Nondisplaced right proximal humerus fracture  - Orthopedic saw, being treated nonoperatively  - nonweightbearing in the right upper extremity and forearm in the sling  - PT/OT following     Acute kidney injury on CKD 3  - Probably secondary to dehydration and poor oral intake   - Resolved with IV fluid and blood product given   - Off IV fluid support  - held losartan initially, back on now  - Resuming home torsemide today     Acute blood loss anemia   - Postoperative, baseline chronic anemia plus hemodilution from IV fluid  - Hemoglobin at baseline ~10, here 9.1>6.6>9.5  - S/P 2 units prbc here     Hypothyroidism  - Continue home Synthroid  - TSH ok 0.7- would recheck in 4 weeks, goal for elderly generally 4-6     Paroxysmal SVT/AVNRT  - Heart rate up in the 150s on 10/22  - Probably secondary to hypotension and acute blood loss anemia  - Resolved after 1 dose of IV metoprolol 5 mg. Has been controlled  since. Off tele  - Echo was done and showed EF of 60 to 65% with moderate aortic stenosis     Hypertension  - Normotensive  - IV hydralazine as needed added with parameters  - Continue home medication of nifedipine 90 mg daily, losartan 100 mg daily  - Resume home torsemide today  - Continue to monitor blood pressure     Acute encephalopathy  - Metabolic, secondary to above  - Baseline dementia  - Caution with narcotics- none needed few days     Malnutrition risk  - Low protein and low food intake pre and post surgery  - Dietitian following       Diet: Advance Diet as Tolerated: Regular Diet Adult  Advance Diet as Tolerated  Snacks/Supplements Adult: Magic Cup; Between Meals  Snacks/Supplements Adult: Ensure Enlive; Between Meals    DVT Prophylaxis: Moderate risk. Lovenox   Boo Catheter: Not present  Central Lines: None  Code Status: No CPR- Do NOT Intubate      Disposition Plan   Disposition: TCU when bed found  Discharge barriers: placement  Medically ready to discharge today: Yes  Estimated discharge date: 10/27/2021     The patient's care was discussed with the Patient.    Aditi Crockett MD  Hospitalist Service  [facility]  Text page via [employer] Paging/Directory      Clinically Significant Risk Factors Present on Admission                ____________        Physical Exam   Vital Signs: Temp: 97.6  F (36.4  C) Temp src: Oral BP: 107/53 Pulse: 72   Resp: 20 SpO2: 96 % O2 Device: None (Room air)    Weight: 130 lbs 0 oz  General: in no apparent distress, non-toxic and alert female lying in hospital bed oriented to person and place  HEENT: Head normocephalic atraumatic, oral mucosa moist. Sclerae anicteric  CV: Irregularly irregular rhythm, normal rate, soft holosystolic murmur heard best LUSB  Resp: No wheezes, no rales or rhonchi, no focal consolidations  GI: Belly soft, nondistended, mildly tender to palpation throughout, bowel sounds present  Skin: No rashes or lesions  Extremities: Trace  ankle edema bilaterally  Psych: Normal affect, mood euthymic. Tangential  Neuro: CNII-XII grossly intact, moving all 4 extremities    Data   Recent Results (from the past 24 hour(s))   Hemoglobin    Collection Time: 10/26/21  5:48 AM   Result Value Ref Range    Hemoglobin 9.5 (L) 11.7 - 15.7 g/dL   Basic metabolic panel    Collection Time: 10/26/21  5:48 AM   Result Value Ref Range    Sodium 139 136 - 145 mmol/L    Potassium 4.1 3.5 - 5.0 mmol/L    Chloride 107 98 - 107 mmol/L    Carbon Dioxide (CO2) 26 22 - 31 mmol/L    Anion Gap 6 5 - 18 mmol/L    Urea Nitrogen 20 8 - 28 mg/dL    Creatinine 0.80 0.60 - 1.10 mg/dL    Calcium 9.5 8.5 - 10.5 mg/dL    Glucose 104 70 - 125 mg/dL    GFR Estimate 64 >60 mL/min/1.73m2   TSH    Collection Time: 10/26/21  5:48 AM   Result Value Ref Range    TSH 0.71 0.30 - 5.00 uIU/mL     ____________  Interval History   Data reviewed today: I reviewed all medications, new labs and imaging results over the last 24 hours. I personally reviewed no images or EKG's today.  Patient has no complaints today.  She is confused but pleasant.  I called her son Awais, no answer, left voicemail.

## 2021-10-27 ENCOUNTER — APPOINTMENT (OUTPATIENT)
Dept: PHYSICAL THERAPY | Facility: HOSPITAL | Age: 86
DRG: 481 | End: 2021-10-27
Payer: MEDICARE

## 2021-10-27 ENCOUNTER — APPOINTMENT (OUTPATIENT)
Dept: SPEECH THERAPY | Facility: HOSPITAL | Age: 86
DRG: 481 | End: 2021-10-27
Payer: MEDICARE

## 2021-10-27 ENCOUNTER — APPOINTMENT (OUTPATIENT)
Dept: OCCUPATIONAL THERAPY | Facility: HOSPITAL | Age: 86
DRG: 481 | End: 2021-10-27
Payer: MEDICARE

## 2021-10-27 ENCOUNTER — APPOINTMENT (OUTPATIENT)
Dept: RADIOLOGY | Facility: HOSPITAL | Age: 86
DRG: 481 | End: 2021-10-27
Attending: HOSPITALIST
Payer: MEDICARE

## 2021-10-27 PROCEDURE — 92526 ORAL FUNCTION THERAPY: CPT | Mod: GN | Performed by: SPEECH-LANGUAGE PATHOLOGIST

## 2021-10-27 PROCEDURE — 250N000013 HC RX MED GY IP 250 OP 250 PS 637: Performed by: STUDENT IN AN ORGANIZED HEALTH CARE EDUCATION/TRAINING PROGRAM

## 2021-10-27 PROCEDURE — 92611 MOTION FLUOROSCOPY/SWALLOW: CPT | Mod: GN | Performed by: SPEECH-LANGUAGE PATHOLOGIST

## 2021-10-27 PROCEDURE — 97110 THERAPEUTIC EXERCISES: CPT | Mod: GP

## 2021-10-27 PROCEDURE — 250N000011 HC RX IP 250 OP 636: Performed by: FAMILY MEDICINE

## 2021-10-27 PROCEDURE — 250N000013 HC RX MED GY IP 250 OP 250 PS 637: Performed by: FAMILY MEDICINE

## 2021-10-27 PROCEDURE — 250N000013 HC RX MED GY IP 250 OP 250 PS 637: Performed by: INTERNAL MEDICINE

## 2021-10-27 PROCEDURE — 74230 X-RAY XM SWLNG FUNCJ C+: CPT

## 2021-10-27 PROCEDURE — 99232 SBSQ HOSP IP/OBS MODERATE 35: CPT | Performed by: HOSPITALIST

## 2021-10-27 PROCEDURE — 250N000013 HC RX MED GY IP 250 OP 250 PS 637: Performed by: PHYSICIAN ASSISTANT

## 2021-10-27 PROCEDURE — 97535 SELF CARE MNGMENT TRAINING: CPT | Mod: GO

## 2021-10-27 PROCEDURE — 250N000013 HC RX MED GY IP 250 OP 250 PS 637: Performed by: HOSPITALIST

## 2021-10-27 PROCEDURE — 250N000011 HC RX IP 250 OP 636: Performed by: INTERNAL MEDICINE

## 2021-10-27 PROCEDURE — 97530 THERAPEUTIC ACTIVITIES: CPT | Mod: GP

## 2021-10-27 PROCEDURE — 120N000001 HC R&B MED SURG/OB

## 2021-10-27 PROCEDURE — 999N000127 HC STATISTIC PERIPHERAL IV START W US GUIDANCE

## 2021-10-27 RX ORDER — BARIUM SULFATE 400 MG/ML
SUSPENSION ORAL ONCE
Status: COMPLETED | OUTPATIENT
Start: 2021-10-27 | End: 2021-10-27

## 2021-10-27 RX ADMIN — POLYETHYLENE GLYCOL 3350 17 G: 17 POWDER, FOR SOLUTION ORAL at 09:13

## 2021-10-27 RX ADMIN — DOCUSATE SODIUM 50 MG AND SENNOSIDES 8.6 MG 1 TABLET: 8.6; 5 TABLET, FILM COATED ORAL at 19:58

## 2021-10-27 RX ADMIN — ACETAMINOPHEN 975 MG: 325 TABLET ORAL at 17:13

## 2021-10-27 RX ADMIN — HYDRALAZINE HYDROCHLORIDE 5 MG: 20 INJECTION INTRAMUSCULAR; INTRAVENOUS at 13:33

## 2021-10-27 RX ADMIN — BARIUM SULFATE: 400 SUSPENSION ORAL at 10:21

## 2021-10-27 RX ADMIN — NIFEDIPINE 90 MG: 60 TABLET, FILM COATED, EXTENDED RELEASE ORAL at 09:12

## 2021-10-27 RX ADMIN — CALCIUM CARBONATE (ANTACID) CHEW TAB 500 MG 500 MG: 500 CHEW TAB at 19:59

## 2021-10-27 RX ADMIN — POLYPROPYLENE GLYCOL 400, PROPYLENE GLYCOL 1 DROP: .4; .3 LIQUID OPHTHALMIC at 09:13

## 2021-10-27 RX ADMIN — THERA TABS 1 TABLET: TAB at 09:12

## 2021-10-27 RX ADMIN — LEVOTHYROXINE SODIUM 75 MCG: 0.03 TABLET ORAL at 09:12

## 2021-10-27 RX ADMIN — LOSARTAN POTASSIUM 100 MG: 25 TABLET, FILM COATED ORAL at 09:13

## 2021-10-27 RX ADMIN — TORSEMIDE 5 MG: 5 TABLET ORAL at 09:12

## 2021-10-27 RX ADMIN — PRAMIPEXOLE DIHYDROCHLORIDE 0.25 MG: 0.25 TABLET ORAL at 19:58

## 2021-10-27 RX ADMIN — ENOXAPARIN SODIUM 40 MG: 40 INJECTION SUBCUTANEOUS at 17:14

## 2021-10-27 RX ADMIN — PANTOPRAZOLE SODIUM 40 MG: 20 TABLET, DELAYED RELEASE ORAL at 06:12

## 2021-10-27 RX ADMIN — ACETAMINOPHEN 975 MG: 325 TABLET ORAL at 09:12

## 2021-10-27 RX ADMIN — ROSUVASTATIN CALCIUM 10 MG: 10 TABLET, FILM COATED ORAL at 19:59

## 2021-10-27 RX ADMIN — POLYPROPYLENE GLYCOL 400, PROPYLENE GLYCOL 1 DROP: .4; .3 LIQUID OPHTHALMIC at 12:24

## 2021-10-27 RX ADMIN — POLYPROPYLENE GLYCOL 400, PROPYLENE GLYCOL 1 DROP: .4; .3 LIQUID OPHTHALMIC at 20:00

## 2021-10-27 RX ADMIN — ESCITALOPRAM OXALATE 20 MG: 20 TABLET ORAL at 09:13

## 2021-10-27 RX ADMIN — ACETAMINOPHEN 975 MG: 325 TABLET ORAL at 12:24

## 2021-10-27 RX ADMIN — CEPHALEXIN 250 MG: 250 CAPSULE ORAL at 09:12

## 2021-10-27 RX ADMIN — PANTOPRAZOLE SODIUM 40 MG: 20 TABLET, DELAYED RELEASE ORAL at 17:13

## 2021-10-27 RX ADMIN — POLYPROPYLENE GLYCOL 400, PROPYLENE GLYCOL 1 DROP: .4; .3 LIQUID OPHTHALMIC at 17:14

## 2021-10-27 RX ADMIN — CALCIUM CARBONATE (ANTACID) CHEW TAB 500 MG 500 MG: 500 CHEW TAB at 09:13

## 2021-10-27 RX ADMIN — DOCUSATE SODIUM 50 MG AND SENNOSIDES 8.6 MG 1 TABLET: 8.6; 5 TABLET, FILM COATED ORAL at 09:12

## 2021-10-27 ASSESSMENT — ACTIVITIES OF DAILY LIVING (ADL)
ADLS_ACUITY_SCORE: 17
ADLS_ACUITY_SCORE: 21
ADLS_ACUITY_SCORE: 17
ADLS_ACUITY_SCORE: 21
ADLS_ACUITY_SCORE: 17
ADLS_ACUITY_SCORE: 21
ADLS_ACUITY_SCORE: 17
ADLS_ACUITY_SCORE: 21
ADLS_ACUITY_SCORE: 17
ADLS_ACUITY_SCORE: 17
ADLS_ACUITY_SCORE: 21
ADLS_ACUITY_SCORE: 21
ADLS_ACUITY_SCORE: 17
ADLS_ACUITY_SCORE: 21
ADLS_ACUITY_SCORE: 21
ADLS_ACUITY_SCORE: 17
ADLS_ACUITY_SCORE: 21
ADLS_ACUITY_SCORE: 17

## 2021-10-27 NOTE — PROGRESS NOTES
10/27/21 1149   General Information   Onset of Illness/Injury or Date of Surgery 10/26/21   Referring Physician Dr. Aditi Crockett   Pertinent History of Current Problem Patient admitted after fall. Patient has hx of dementia. Clinical swallow evaluation completed 10/26/2021 with inconsistent signs/symptoms of aspiration of thin liquids noted. Patient referred for VFSS for objective assessment to further define risk for aspiration.   General Observations Patient pleasantly confused; cooperative throughout exam.   Type of Evaluation   Type of Evaluation Swallow Evaluation   General Swallowing Observations   Swallowing Evaluation Videofluoroscopic swallow study (VFSS)   VFSS Evaluation   Radiologist Dr. Laurent   Views Taken left lateral   Physical Location of Procedure [facility] suite   VFSS Textures Trialed thin liquids;mildly thick liquids;pureed;soft & bite-sized   VFSS Eval: Thin Liquid Texture Trial   Mode of Presentation, Thin Liquid spoon;cup;straw;fed by clinician;self-fed   Order of Presentation 2 (spoon); 3, 4 (cup); 5, 8 (straw)   Preparatory Phase WFL   Oral Phase, Thin Liquid WFL   Pharyngeal Phase, Thin Liquid WFL   Rosenbek's Penetration Aspiration Scale: Thin Liquid Trial Results 1 - no aspiration, contrast does not enter airway   VFSS Eval: Mildly Thick Liquids   Mode of Presentation spoon;fed by clinician   Order of Presentation 1   Preparatory Phase WFL   Oral Phase WFL   Pharyngeal Phase WFL   Rosenbek's Penetration Aspiration Scale 1 - no aspiration, contrast does not enter airway   VFSS Evaluation: Puree Solid Texture Trial   Mode of Presentation, Puree spoon;fed by clinician   Order of Presentation 6   Preparatory Phase Poor bolus control  (mild)   Oral Phase, Puree Poor AP movement  (mild)   Pharyngeal Phase, Puree WFL   Rosenbek's Penetration Aspiration Scale: Puree Food Trial Results 1 - no aspiration, contrast does not enter airway   VFSS Eval: Soft & Bite Sized    Mode of Presentation spoon   Order of presentation 7   Preparatory Phase Insufficient mastication  (mild)   Oral Phase Poor AP movement  (mild)   Pharyngeal Phase Residue in valleculae  (trace diffuse pharyngeal residue; min vallecular)   Rosenbek's Penetration Aspiration Scale 1 - no aspiration, contrast does not enter airway   Esophageal Phase of Swallow   Patient reports or presents with symptoms of esophageal dysphagia No   Esophageal sweep performed during today s vidofluoroscopic exam  No   Swallowing Recommendations   Diet Consistency Recommendations regular diet;thin liquids (level 0)   Supervision Level for Intake distant supervision needed   Mode of Delivery Recommendations bolus size, small;slow rate of intake   Monitoring/Assistance Required (Eating/Swallowing) stop eating activities when fatigue is present   Recommended Feeding/Eating Techniques (Swallow Eval) maintain upright sitting position for eating;set-up and prepare tray   Medication Administration Recommendations, Swallowing (SLP) One at a time.   General Therapy Interventions   Planned Therapy Interventions Dysphagia Treatment   Dysphagia treatment Instruction of safe swallow strategies   SLP Therapy Assessment/Plan   Criteria for Skilled Therapeutic Interventions Met (SLP Eval) yes;treatment indicated   SLP Diagnosis Minimal dysphagia   Rehab Potential (SLP Eval) good, to achieve stated therapy goals   Therapy Frequency (SLP Eval)   (1 follow up session)   Comment, Therapy Assessment/Plan (SLP) Minimal oropharyngeal dysphagia under fluoroscopy today characterized by mild extended oral phase with puree and soft solid texture, mild reduced hyolaryngeal elevation and mild reduced pharyngeal constriction which resulted in minimal/trace pharyngeal residue with soft solid texture. No aspiration or laryngeal penetration occurred during today's study. Hyolaryngeal excursion and epiglottic inversion adequate.   SLP Discharge Planning    SLP Discharge  Recommendation (DC Rec)   (defer to OT/PT)   SLP Rationale for DC Rec No further SLP needs anticipated at discharge.   SLP Brief overview of current status  Recommend regular diet with thin liquids given swallow precautions (up in chair, small bites/sips, slow rate). Please serve pills one at a time.    Total Evaluation Time   Total Evaluation Time (Minutes) 15

## 2021-10-27 NOTE — PLAN OF CARE
Problem: Pain Acute  Goal: Acceptable Pain Control and Functional Ability  Outcome: Improving   Pt appears comfortable with scheduled tylenol. Up in chair with chayito lift. [place] thick liquids. Video swallow study complete.

## 2021-10-27 NOTE — PROGRESS NOTES
[facility]    Medicine Progress Note - Hospitalist Service       Date of Admission:  10/19/2021    Assessment & Plan             Johanna Washburn is a 89 year old female admitted on 10/19/2021. She has history of CKD 3, chronic anemia, hypothyroidism, paroxysmal SVT/AVNRT, CAD, hyperlipidemia, history of lumbar compression fracture and presented to the ER with fall and found to have nondisplaced intertrochanteric fracture of the right femur and closed fracture of the proximal end of right humerus. Awaiting TCU placement     Nondisplaced right intertrochanteric fracture  - S/P mechanical fall  - Had internal fixation surgery on 10/20/2021 with Dr. Reed  - 7 Days Post-Op  - Postoperative care as per orthopedics  - PT/OT following, patient will need TCU  - Continue incentive spirometry  - Continue scheduled Tylenol for pain control  - Caution with narcotics giving patient age and dementia- not needed any in few days  - Anticoagulation with Lovenox as per orthopedics, plan 30 day course     Nondisplaced right proximal humerus fracture  - Orthopedic saw, being treated nonoperatively  - nonweightbearing in the right upper extremity and forearm in the sling  - PT/OT following    Hypertension  - IV hydralazine as needed added with parameters  - Continue home medication of nifedipine 90 mg daily, losartan 100 mg daily, torsemide 5mg daily  - Continue to monitor blood pressure. BP a bit labile and hold on any changes for today even though control suboptimal    Minimal oropharyngeal dysphagia  -Staff noting patient coughs with oral intake  -Speech saw and patient had VSS and no aspiration, ok for all textures, needs to sit upright and take small bites/sips; give pills one at a time     Malnutrition risk  - Low protein and low food intake pre and post surgery  - Dietitian following     Hypothyroidism  - Continue home Synthroid  - TSH ok 0.7- would recheck in 4 weeks, goal for elderly generally  4-6     Resolved problems:   Acute kidney injury on CKD 3  - Probably secondary to dehydration and poor oral intake   - Resolved with IV fluid and blood product given   - Off IV fluid support  - held losartan and torsemide initially, back on now     Paroxysmal SVT/AVNRT  - Heart rate up in the 150s on 10/22  - Probably secondary to hypotension and acute blood loss anemia  - Resolved after 1 dose of IV metoprolol 5 mg. Has been controlled since. Off tele  - Echo was done and showed EF of 60 to 65% with moderate aortic stenosis     Acute blood loss anemia   - Postoperative, baseline chronic anemia plus hemodilution from IV fluid  - Hemoglobin at baseline ~10, here 9.1>6.6>9.5  - S/P 2 units prbc here     Acute encephalopathy  - Metabolic, secondary to above  - Baseline dementia  - Caution with narcotics- none needed few days       Diet: Advance Diet as Tolerated  Snacks/Supplements Adult: Magic Cup; Between Meals  Snacks/Supplements Adult: Ensure Enlive; Between Meals  Advance Diet as Tolerated: Regular Diet Adult    DVT Prophylaxis: Moderate risk. Lovenox   Boo Catheter: Not present  Central Lines: None  Code Status: No CPR- Do NOT Intubate      Disposition Plan   Disposition: TCU when bed found  Discharge barriers: placement  Medically ready to discharge today: Yes  Estimated discharge date: 10/28/2021     The patient's care was discussed with the Patient.    Aditi Crockett MD  Hospitalist Service  [facility]  Text page via [employer] Paging/Directory      Clinically Significant Risk Factors Present on Admission                ____________        Physical Exam   Vital Signs: Temp: 97.6  F (36.4  C) Temp src: Oral BP: (!) 152/71 Pulse: 65   Resp: 18 SpO2: 97 % O2 Device: None (Room air)    Weight: 131 lbs 6.4 oz  General: in no apparent distress, non-toxic and alert female lying in hospital bed oriented to person and place  HEENT: Head normocephalic atraumatic, oral mucosa moist. Sclerae  anicteric  Skin: No rashes or lesions  Extremities: Trace ankle edema bilaterally  Psych: Normal affect, mood euthymic. Tangential  Neuro: CNII-XII grossly intact, moving all 4 extremities    Data   No new labs today  ____________  Interval History   Data reviewed today: I reviewed all medications, new labs and imaging results over the last 24 hours. I personally reviewed no images or EKG's today.  Patient no particular complaints.  She is sitting up in the chair when I visit with her.  She asked for some water, I gave her a sip from her cup of thickened water, she did not like this.  No acute issues overnight.  Awaiting TCU placement.    Tried to call and update son Chad no answer, and no particular issues to discuss so no VM left

## 2021-10-27 NOTE — PLAN OF CARE
Problem: Adult Inpatient Plan of Care  Goal: Plan of Care Review  Outcome: Improving  Goal: Patient-Specific Goal (Individualized)  Outcome: Improving  Goal: Absence of Hospital-Acquired Illness or Injury  Outcome: Improving  Intervention: Identify and Manage Fall Risk  Recent Flowsheet Documentation  Taken 10/27/2021 0205 by Jovon Chopra RN  Safety Promotion/Fall Prevention:   activity supervised   bed alarm on   clutter free environment maintained   fall prevention program maintained   nonskid shoes/slippers when out of bed  Goal: Optimal Comfort and Wellbeing  Outcome: Improving  Intervention: Provide Person-Centered Care  Recent Flowsheet Documentation  Taken 10/27/2021 0205 by Jovon Chopra RN  Trust Relationship/Rapport: questions encouraged  Goal: Readiness for Transition of Care  Outcome: Improving     Problem: Risk for Delirium  Goal: Optimal Coping  Outcome: Improving  Goal: Improved Behavioral Control  Outcome: Improving  Goal: Improved Attention and Thought Clarity  Outcome: Improving  Goal: Improved Sleep  Outcome: Improving     Problem: OT General Care Plan  Goal: Toilet Transfer/Toileting (OT)  Description: Toilet Transfer/Toileting (OT)  Outcome: Improving     Problem: Pain Acute  Goal: Acceptable Pain Control and Functional Ability  Outcome: Improving  Intervention: Prevent or Manage Pain  Recent Flowsheet Documentation  Taken 10/27/2021 0205 by Jovon Chopra RN  Medication Review/Management: medications reviewed

## 2021-10-27 NOTE — PLAN OF CARE
"Problem: Risk for Delirium  Goal: Improved Behavioral Control  Outcome: Improving   Pt pleasant, calm & compliant on shift.    Problem: Pain Acute  Goal: Acceptable Pain Control and Functional Ability  Outcome: Improving  Intervention: Develop Pain Management Plan  Recent Flowsheet Documentation  Taken 10/26/2021 1646 by Swathi Flores RN  Pain Management Interventions:   medication (see MAR)   distraction   emotional support   rest   repositioned   Pt states she has pain, baseline of confusion. Sched. APAP admin. Pt later stated \"it's better\" when writer inquired with patient regarding pain. Pt slept intermittently on shift. Pt appeared comfortable on shift. 0 non-verbal s/s pain.  "

## 2021-10-27 NOTE — PROGRESS NOTES
Care Management Follow Up    Length of Stay (days): 8    Expected Discharge Date: 10/27/2021     Concerns to be Addressed:  discharge planning     Patient plan of care discussed at interdisciplinary rounds: No    Anticipated Discharge Disposition:  SNF/TCU     Anticipated Discharge Services:  TCU  Anticipated Discharge DME:  none    Patient/family educated on Medicare website which has current facility and service quality ratings:  yes  Education Provided on the Discharge Plan:  yes  Patient/Family in Agreement with the Plan:  yes    Referrals Placed by CM/SW:  TCU  Private pay costs discussed: not at this time    Additional Information:  [facility] placed to [facility] to confirm acceptance to their TCU today.  Per Elzbieta in Admissions, their anticipated discharges did not happen and they no longer have beds available.   Elzbieta called back shortly afterwards, and states it appears pt is not making much progress or improvement and appears to be too much work so they are declining admission.    Calls placed to all other TCUs with pending referrals:    -  [place] [facility] - call placed to Admissions and left message requesting return call   - [facility] - call placed to Admissions and left message requesting return call   - [facility] - call placed to Admissions and left message requesting return call   - [facility] - per Vikcy in Admissions, they are not seeing any recent PT notes to show progress and she feels they cannot meet pt's needs at this time   - [facility] - not accepting any admissions    Call placed to [facility] at [facility] and left message for RN to call back to discuss any options for pt to return to [facility].    Call placed to Chad fischer, and provided update. Discussed potential need for LTC if TCUs continue to decline pt and feel she will not progress with therapy.  He states he would like to wait to see what the RN at [facility] says about whether or not they can  accept her back and increase services.    Call placed to [facility] to cancel stretcher transport for this afternoon.    10:58 AM  Charley from [facility] Admissions called to say she needs additional information.  Complete SNF referral faxed.    3:10 PM  Spoke with PHUONG Mario at [facility]. She states pt had been independent ambulating and grooming self prior to admission. She states if pt needs assist of 2 they are unable to provide this level of care. They cannot use chayito lifts in [place]. If they had an opening in memory care they could use a chayito lift, but they do not have any openings and do not anticipate any.  If pt were to progress to needing assist of one only they could take her back.     Call placed to sonChad and provided update from [facility]. He states he is agreeable with Long-Term Care (LTC) referrals.  However, he would like PT to continue to work with pt and to try to push her a little harder and feels she might respond better if she knows she can get home to her [facility] sooner if she tries harder with transfers and exercises.  LTC referrals sent to several facilities of son's choice.    Patsy Morejon RN    '

## 2021-10-27 NOTE — PLAN OF CARE
Speech Language Therapy Discharge Summary    Reason for therapy discharge:    All goals and outcomes met, no further needs identified.    Progress towards therapy goal(s). See goals on Care Plan in [facility] electronic health record for goal details.  Goals met    Therapy recommendation(s):    No further therapy is recommended. Video swallow study completed today (see report for details). Recommend regular diet with thin liquids given swallow strategies (up in chair/fully upright position, small bites/sips, slow rate). Please serve pills one at a time.

## 2021-10-27 NOTE — PROGRESS NOTES
"Orthopedic Progress Note      Assessment: 7 Days Post-Op  S/P Procedure(s):  INTERNAL FIXATION, FRACTURE, TROCHANTERIC, HIP, USING IM NAIL, USING HANA TABLE     Plan:   - Continue PT/OT  - Weightbearing status: WBAT if able. Encourage up to chair, standing, etc.   - Anticoagulation: lovenox per medicine in addition to SCDs, kim stockings and early ambulation.  - Discharge planning: TCU placement       Subjective:  Limited due to dementia    Denies pain.     Objective:  BP (!) 152/71 (BP Location: Left arm)   Pulse 65   Temp 97.6  F (36.4  C) (Oral)   Resp 18   Ht 1.676 m (5' 6\")   Wt 59.6 kg (131 lb 6.4 oz)   SpO2 97%   Breastfeeding No   BMI 21.21 kg/m    The patient is up sitting in chair, sleepy. Appears comfortable.   Dorsalis pedis pulse intact.  Calves are soft.  The incision is covered. Dressing C/D/I.      Pertinent Labs   Lab Results: personally reviewed.   Lab Results   Component Value Date    INR 1.02 09/02/2018     Lab Results   Component Value Date    WBC 10.2 10/23/2021    HGB 9.5 (L) 10/26/2021    HCT 25.6 (L) 10/23/2021    MCV 92 10/23/2021     10/23/2021     Lab Results   Component Value Date     10/26/2021    CO2 26 10/26/2021         Report completed by:  Kendra Alonzo PA-C, AMANDA  Date: 10/27/2021  Time: 2:31 PM    "

## 2021-10-27 NOTE — PROGRESS NOTES
10/27/21 1428   Signing Clinician's Name / Credentials   Signing clinician's name / credentials Dayanara Massey PTA   Quick Adds   Rehab Discipline PT   PT Assistant Visit Number 1   Functional Transfer Training   Minutes of Treatment 10   Treatment Detail sit/stand 2x, once in slipper socks- feet slid forward, once w/ tennis shoes on(Max A to don tennis shoes), less slipping of feet, signif posterior lean and hip flexion initially, w/ Mod/Max tactile cues for hip extension and verbal cues for posture, pt able to improve   Therapeutic Activity   Minutes of Treatment 5 minutes   Treatment Detail standing w/ platform FWW, 2x 30-45sec ea. Mod A 2   Therapeutic Exercise   Minutes of Treatment 10   Treatment Detail seated LE ex, 10reps ea, all I w/ constant cues to stay on task, performs L shld flex ex on own 5x    PT Discharge Planning    PT Discharge Recommendation (DC Rec) Transitional Care Facility  (long term)   PT Rationale for DC Rec if back to long term would need 24hr A, A of 2 for all mobility or chayito sling. Rec home PT   PT Brief overview of current status  pleasantly confused, willing to participate    Additional Documentation   Rehab Comments R UE WBAT w/ platform walker(in room), no signs of pain in R UE w/ activity   PT Plan standing solange, pivot, LE ex, bed mob   Total Session Time   Total Session Time (minutes) 25 minutes

## 2021-10-28 ENCOUNTER — APPOINTMENT (OUTPATIENT)
Dept: PHYSICAL THERAPY | Facility: HOSPITAL | Age: 86
DRG: 481 | End: 2021-10-28
Payer: MEDICARE

## 2021-10-28 ENCOUNTER — APPOINTMENT (OUTPATIENT)
Dept: OCCUPATIONAL THERAPY | Facility: HOSPITAL | Age: 86
DRG: 481 | End: 2021-10-28
Payer: MEDICARE

## 2021-10-28 ENCOUNTER — LAB REQUISITION (OUTPATIENT)
Dept: LAB | Facility: CLINIC | Age: 86
End: 2021-10-28
Payer: MEDICARE

## 2021-10-28 VITALS
HEIGHT: 66 IN | BODY MASS INDEX: 21.12 KG/M2 | TEMPERATURE: 98 F | WEIGHT: 131.4 LBS | DIASTOLIC BLOOD PRESSURE: 70 MMHG | RESPIRATION RATE: 16 BRPM | SYSTOLIC BLOOD PRESSURE: 160 MMHG | HEART RATE: 73 BPM | OXYGEN SATURATION: 95 %

## 2021-10-28 DIAGNOSIS — R52 PAIN: Primary | ICD-10-CM

## 2021-10-28 DIAGNOSIS — U07.1 COVID-19: ICD-10-CM

## 2021-10-28 DIAGNOSIS — R11.0 NAUSEA: ICD-10-CM

## 2021-10-28 PROBLEM — F33.0 MILD RECURRENT MAJOR DEPRESSION (H): Status: ACTIVE | Noted: 2021-10-28

## 2021-10-28 PROBLEM — I12.9 CHRONIC KIDNEY DISEASE DUE TO HYPERTENSION: Status: ACTIVE | Noted: 2021-10-28

## 2021-10-28 PROBLEM — F41.1 GENERALIZED ANXIETY DISORDER: Status: ACTIVE | Noted: 2021-10-28

## 2021-10-28 PROBLEM — D50.9 IRON DEFICIENCY ANEMIA: Status: ACTIVE | Noted: 2021-10-28

## 2021-10-28 PROBLEM — N18.31 CHRONIC KIDNEY DISEASE, STAGE 3A (H): Status: ACTIVE | Noted: 2021-10-28

## 2021-10-28 PROBLEM — K44.9 HIATAL HERNIA: Status: ACTIVE | Noted: 2021-10-28

## 2021-10-28 PROBLEM — K11.7 XEROSTOMIA: Status: ACTIVE | Noted: 2021-10-28

## 2021-10-28 PROBLEM — M84.40XA PATHOLOGICAL FRACTURE: Status: ACTIVE | Noted: 2021-10-28

## 2021-10-28 PROBLEM — I45.10 RIGHT BUNDLE BRANCH BLOCK: Status: ACTIVE | Noted: 2021-10-28

## 2021-10-28 PROBLEM — Z85.850 HISTORY OF MALIGNANT NEOPLASM OF THYROID: Status: ACTIVE | Noted: 2021-10-28

## 2021-10-28 PROBLEM — M54.30 SCIATICA: Status: ACTIVE | Noted: 2021-10-28

## 2021-10-28 PROBLEM — K59.01 SLOW TRANSIT CONSTIPATION: Status: ACTIVE | Noted: 2021-10-28

## 2021-10-28 PROBLEM — E22.2 SIADH (SYNDROME OF INAPPROPRIATE ADH PRODUCTION) (H): Status: ACTIVE | Noted: 2021-10-28

## 2021-10-28 PROBLEM — R29.6 RECURRENT FALLS: Status: ACTIVE | Noted: 2021-10-28

## 2021-10-28 PROBLEM — M51.369 DEGENERATION OF LUMBAR INTERVERTEBRAL DISC: Status: ACTIVE | Noted: 2021-10-28

## 2021-10-28 PROBLEM — E89.0 POSTOPERATIVE HYPOTHYROIDISM: Status: ACTIVE | Noted: 2021-10-28

## 2021-10-28 PROBLEM — E55.9 VITAMIN D DEFICIENCY: Status: ACTIVE | Noted: 2021-10-28

## 2021-10-28 PROBLEM — R29.90 SYMPTOM REFERRABLE TO NERVOUS SYSTEM: Status: ACTIVE | Noted: 2021-10-28

## 2021-10-28 PROBLEM — I25.10 ATHEROSCLEROSIS OF CORONARY ARTERY WITHOUT ANGINA PECTORIS: Status: ACTIVE | Noted: 2021-10-28

## 2021-10-28 PROBLEM — M17.9 OSTEOARTHRITIS OF KNEE: Status: ACTIVE | Noted: 2021-10-28

## 2021-10-28 PROBLEM — M79.10 MYALGIA: Status: ACTIVE | Noted: 2021-10-28

## 2021-10-28 PROBLEM — M81.0 OSTEOPOROSIS: Status: ACTIVE | Noted: 2021-10-28

## 2021-10-28 PROBLEM — Z87.898 HISTORY OF NEOPLASM: Status: ACTIVE | Noted: 2021-10-28

## 2021-10-28 PROBLEM — G25.81 RESTLESS LEGS: Status: ACTIVE | Noted: 2021-10-28

## 2021-10-28 PROBLEM — R13.10 DYSPHAGIA: Status: ACTIVE | Noted: 2021-10-28

## 2021-10-28 PROBLEM — R41.3 MEMORY LOSS: Status: ACTIVE | Noted: 2021-10-28

## 2021-10-28 PROBLEM — F03.90 DEMENTIA (H): Status: ACTIVE | Noted: 2021-10-28

## 2021-10-28 PROBLEM — M79.9 DISORDER OF SOFT TISSUE: Status: ACTIVE | Noted: 2021-10-28

## 2021-10-28 PROCEDURE — 97110 THERAPEUTIC EXERCISES: CPT | Mod: GP

## 2021-10-28 PROCEDURE — 250N000013 HC RX MED GY IP 250 OP 250 PS 637: Performed by: FAMILY MEDICINE

## 2021-10-28 PROCEDURE — 99238 HOSP IP/OBS DSCHRG MGMT 30/<: CPT | Performed by: HOSPITALIST

## 2021-10-28 PROCEDURE — 250N000013 HC RX MED GY IP 250 OP 250 PS 637: Performed by: INTERNAL MEDICINE

## 2021-10-28 PROCEDURE — U0003 INFECTIOUS AGENT DETECTION BY NUCLEIC ACID (DNA OR RNA); SEVERE ACUTE RESPIRATORY SYNDROME CORONAVIRUS 2 (SARS-COV-2) (CORONAVIRUS DISEASE [COVID-19]), AMPLIFIED PROBE TECHNIQUE, MAKING USE OF HIGH THROUGHPUT TECHNOLOGIES AS DESCRIBED BY CMS-2020-01-R: HCPCS | Performed by: FAMILY MEDICINE

## 2021-10-28 PROCEDURE — 250N000013 HC RX MED GY IP 250 OP 250 PS 637: Performed by: PHYSICIAN ASSISTANT

## 2021-10-28 PROCEDURE — 97530 THERAPEUTIC ACTIVITIES: CPT | Mod: GP

## 2021-10-28 PROCEDURE — 97535 SELF CARE MNGMENT TRAINING: CPT | Mod: GO

## 2021-10-28 PROCEDURE — 250N000013 HC RX MED GY IP 250 OP 250 PS 637: Performed by: STUDENT IN AN ORGANIZED HEALTH CARE EDUCATION/TRAINING PROGRAM

## 2021-10-28 PROCEDURE — 250N000013 HC RX MED GY IP 250 OP 250 PS 637: Performed by: HOSPITALIST

## 2021-10-28 PROCEDURE — 97110 THERAPEUTIC EXERCISES: CPT | Mod: GO

## 2021-10-28 RX ORDER — TORSEMIDE 10 MG/1
10 TABLET ORAL DAILY
Qty: 30 TABLET | Refills: 0 | Status: SHIPPED | OUTPATIENT
Start: 2021-10-28 | End: 2021-11-02

## 2021-10-28 RX ORDER — ONDANSETRON 4 MG/1
4 TABLET, ORALLY DISINTEGRATING ORAL
Start: 2021-10-28 | End: 2021-11-02

## 2021-10-28 RX ORDER — TORSEMIDE 5 MG/1
10 TABLET ORAL DAILY
Status: DISCONTINUED | OUTPATIENT
Start: 2021-10-29 | End: 2021-10-28 | Stop reason: HOSPADM

## 2021-10-28 RX ORDER — OXYCODONE HYDROCHLORIDE 5 MG/1
2.5 TABLET ORAL EVERY 6 HOURS PRN
Qty: 30 TABLET | Refills: 0 | Status: SHIPPED | OUTPATIENT
Start: 2021-10-28 | End: 2021-11-05

## 2021-10-28 RX ORDER — MULTIVITAMIN,THERAPEUTIC
1 TABLET ORAL DAILY
COMMUNITY
Start: 2021-10-29

## 2021-10-28 RX ADMIN — LOSARTAN POTASSIUM 100 MG: 25 TABLET, FILM COATED ORAL at 08:24

## 2021-10-28 RX ADMIN — NIFEDIPINE 90 MG: 60 TABLET, FILM COATED, EXTENDED RELEASE ORAL at 08:24

## 2021-10-28 RX ADMIN — CEPHALEXIN 250 MG: 250 CAPSULE ORAL at 08:27

## 2021-10-28 RX ADMIN — PANTOPRAZOLE SODIUM 40 MG: 20 TABLET, DELAYED RELEASE ORAL at 06:14

## 2021-10-28 RX ADMIN — THERA TABS 1 TABLET: TAB at 08:31

## 2021-10-28 RX ADMIN — POLYPROPYLENE GLYCOL 400, PROPYLENE GLYCOL 1 DROP: .4; .3 LIQUID OPHTHALMIC at 13:49

## 2021-10-28 RX ADMIN — ACETAMINOPHEN 975 MG: 325 TABLET ORAL at 11:46

## 2021-10-28 RX ADMIN — DOCUSATE SODIUM 50 MG AND SENNOSIDES 8.6 MG 1 TABLET: 8.6; 5 TABLET, FILM COATED ORAL at 08:31

## 2021-10-28 RX ADMIN — LEVOTHYROXINE SODIUM 75 MCG: 0.03 TABLET ORAL at 08:33

## 2021-10-28 RX ADMIN — TORSEMIDE 5 MG: 5 TABLET ORAL at 08:28

## 2021-10-28 RX ADMIN — POLYPROPYLENE GLYCOL 400, PROPYLENE GLYCOL 1 DROP: .4; .3 LIQUID OPHTHALMIC at 08:30

## 2021-10-28 RX ADMIN — ESCITALOPRAM OXALATE 20 MG: 20 TABLET ORAL at 08:27

## 2021-10-28 RX ADMIN — ACETAMINOPHEN 975 MG: 325 TABLET ORAL at 08:31

## 2021-10-28 RX ADMIN — CALCIUM CARBONATE (ANTACID) CHEW TAB 500 MG 500 MG: 500 CHEW TAB at 08:31

## 2021-10-28 ASSESSMENT — ACTIVITIES OF DAILY LIVING (ADL)
ADLS_ACUITY_SCORE: 17
ADLS_ACUITY_SCORE: 17
ADLS_ACUITY_SCORE: 19
ADLS_ACUITY_SCORE: 17
ADLS_ACUITY_SCORE: 19
ADLS_ACUITY_SCORE: 19
ADLS_ACUITY_SCORE: 17
ADLS_ACUITY_SCORE: 19
ADLS_ACUITY_SCORE: 17
ADLS_ACUITY_SCORE: 19
ADLS_ACUITY_SCORE: 17
ADLS_ACUITY_SCORE: 19
ADLS_ACUITY_SCORE: 17

## 2021-10-28 NOTE — PLAN OF CARE
Problem: Adult Inpatient Plan of Care  Goal: Optimal Comfort and Wellbeing  Outcome: Improving  Intervention: Provide Person-Centered Care  Recent Flowsheet Documentation  Taken 10/28/2021 0030 by Jovon Chopra RN  Trust Relationship/Rapport:   care explained   questions encouraged   Patient denied pain when asked. No nonverbals indicating pain.

## 2021-10-28 NOTE — DISCHARGE SUMMARY
POST HOC DOCUMENTATION  Patient discharged on 10/28  Asked by coders to clarify if patient had malnutrition. Yes she had moderate malnutrition.  [facility] MEDICINE  DISCHARGE SUMMARY     Primary Care Physician: Reed Shore  Admission Date: 10/19/2021   Discharge Provider: Aditi Crockett Discharge Date: 10/28/2021   Diet:   Active Diet and Nourishment Order   Procedures     Snacks/Supplements Adult: Magic Cup; Between Meals     Snacks/Supplements Adult: Ensure Enlive; Between Meals     Advance Diet as Tolerated     Advance Diet as Tolerated: Regular Diet Adult       Code Status: No CPR- Do NOT Intubate   Activity: DCACTIVITY: Activity as tolerated        Condition at Discharge: Good     REASON FOR PRESENTATION(See Admission Note for Details)   fall    PRINCIPAL & ACTIVE DISCHARGE DIAGNOSES     Active Problems:    Supraventricular tachycardia (H)    Closed nondisplaced intertrochanteric fracture of right femur, initial encounter (H)    Closed fracture of proximal end of right humerus, unspecified fracture morphology, initial encounter    Acute renal failure syndrome (H)      PENDING LABS     none    PROCEDURES ( this hospitalization only)      Procedure(s):  INTERNAL FIXATION, FRACTURE, TROCHANTERIC, HIP, USING IM NAIL, USING HANA TABLE    RECOMMENDATIONS TO OUTPATIENT PROVIDER FOR F/U VISIT     Follow-up Appointments     Follow Up Care      Please follow-up with Dr. Reed in 2 weeks from surgery for staple   removal and wound check at [facility]. Call our scheduling line at   [phone] to make an appointment if you do not already have one   scheduled.         Follow Up and recommended labs and tests      Follow up with shelter physician.  The following labs/tests are   recommended: Hgb in 1 week.             DISPOSITION     Skilled Nursing Facility    SUMMARY OF HOSPITAL COURSE:      Johanna Washburn is a 89 year old female admitted on 10/19/2021. She  has history of dementia, CKD 3, chronic anemia, hypothyroidism, paroxysmal SVT/AVNRT, CAD, hyperlipidemia, history of lumbar compression fracture and presented to the ER with fall and found to have nondisplaced intertrochanteric fracture of the right femur and closed fracture of the proximal end of right humerus.     Nondisplaced right intertrochanteric fracture  - S/P mechanical fall  - Had internal fixation surgery on 10/20/2021 with Dr. Reed  - 8 Days Post-Op  - PT/OT following, patient going to TCU  - Continue scheduled Tylenol for pain control  - Anticoagulation with Lovenox as per orthopedics, plan 30 day course. Resume previous home aspirin when done with Lovenox.  - Follow-up with surgeon in 1-2 weeks     Nondisplaced right proximal humerus fracture  - Orthopedic saw, being treated nonoperatively  - nonweightbearing in the right upper extremity and forearm in the sling  - PT/OT following  - Follow-up with orthopedics as above     Hypertension  - Continue home medication of nifedipine 90 mg daily, losartan 100 mg daily  - Increased home torsemide to 10 mg daily     Minimal oropharyngeal dysphagia  -Staff notedpatient coughs with oral intake  -Speech saw and patient had VSS and no aspiration, ok for all textures, needs to sit upright and take small bites/sips; give pills one at a time      Hypothyroidism  - Continue home Synthroid  - TSH ok 0.7- would recheck in 4 weeks, goal for elderly generally 4-6     Resolved problems:   Acute kidney injury on CKD 3  - Probably secondary to dehydration and poor oral intake   - Resolved with IV fluid and blood product given       Paroxysmal SVT/AVNRT  - Heart rate up in the 150s on 10/22  - Probably secondary to hypotension and acute blood loss anemia  - Resolved after 1 dose of IV metoprolol 5 mg. Has been controlled since.  - Echo was done and showed EF of 60 to 65% with moderate aortic stenosis     Acute blood loss anemia   - Postoperative, baseline chronic  anemia plus hemodilution from IV fluid  - Hemoglobin at baseline ~10, here 9.1>6.6>9.5  - S/P 2 units prbc here. Could recheck Hgb 1 week     Discharge Medications with Med changes:     Current Discharge Medication List      START taking these medications    Details   enoxaparin ANTICOAGULANT (LOVENOX) 40 MG/0.4ML syringe Inject 0.4 mLs (40 mg) Subcutaneous every 24 hours for 25 days  Qty: 10 mL, Refills: 0    Associated Diagnoses: Closed nondisplaced intertrochanteric fracture of right femur, initial encounter (H)      multivitamin, therapeutic (THERA-VIT) TABS tablet Take 1 tablet by mouth daily    Associated Diagnoses: Mild malnutrition (H)      polyethylene glycol (MIRALAX) 17 GM/Dose powder Take 17 g by mouth daily as needed for constipation  Qty: 510 g, Refills: 0    Associated Diagnoses: Closed nondisplaced intertrochanteric fracture of right femur, initial encounter (H)         CONTINUE these medications which have CHANGED    Details   acetaminophen (TYLENOL) 325 MG tablet Take 3 tablets (975 mg) by mouth every 8 hours  Qty: 90 tablet, Refills: 1    Associated Diagnoses: Closed fracture of proximal end of right humerus, unspecified fracture morphology, initial encounter; Closed nondisplaced intertrochanteric fracture of right femur, initial encounter (H)      aspirin (ASA) 81 MG chewable tablet Take 1 tablet (81 mg) by mouth daily  Qty: 30 tablet, Refills: 0    Comments: Resume your home med aspirin once you are finished with 30 day course of enoxaparin  Associated Diagnoses: Coronary artery disease involving native heart without angina pectoris, unspecified vessel or lesion type      torsemide (DEMADEX) 10 MG tablet Take 1 tablet (10 mg) by mouth daily  Qty: 30 tablet, Refills: 0    Associated Diagnoses: Essential hypertension         CONTINUE these medications which have NOT CHANGED    Details   benzonatate (TESSALON) 100 MG capsule Take 100 mg by mouth 3 times daily as needed for cough       !! calcium  carbonate (TUMS) 500 MG chewable tablet Take 1 chew tab by mouth nightly as needed for heartburn      !! calcium, as carbonate, (TUMS) 200 mg calcium (500 mg) chewable tablet Take 1 tablet by mouth 2 times daily 9AM, 4PM      cephALEXin (KEFLEX) 250 MG capsule Take 250 mg by mouth daily For UTI prevention.      diclofenac (VOLTAREN) 1 % topical gel Apply 2 g topically 2 times daily as needed for moderate pain      diclofenac sodium (VOLTAREN) 1 % Gel Apply 2 g topically daily       ergocalciferol (VITAMIN D2) 50,000 unit capsule Take 50,000 Units by mouth once a week Thursdays      escitalopram (LEXAPRO) 20 MG tablet Take 20 mg by mouth daily      guaiFENesin (ROBITUSSIN) 100 mg/5 mL syrup Take 200 mg by mouth every 6 hours as needed for cough       levothyroxine (SYNTHROID, LEVOTHROID) 75 MCG tablet [LEVOTHYROXINE (SYNTHROID, LEVOTHROID) 75 MCG TABLET] Take 75 mcg by mouth daily.      lidocaine (LIDODERM) 5 % patch Place 1 patch onto the skin daily as needed for moderate pain (rib pain) To prevent lidocaine toxicity, patient should be patch free for 12 hrs daily.      losartan (COZAAR) 100 MG tablet Take 100 mg by mouth daily      NIFEdipine (PROCARDIA XL) 90 MG 24 hr tablet [NIFEDIPINE (PROCARDIA XL) 90 MG 24 HR TABLET] Take 90 mg by mouth daily.      nitroglycerin (NITROSTAT) 0.4 MG SL tablet [NITROGLYCERIN (NITROSTAT) 0.4 MG SL TABLET] Place 0.4 mg under the tongue every 5 (five) minutes as needed for chest pain.      omeprazole (PRILOSEC) 20 MG capsule [OMEPRAZOLE (PRILOSEC) 20 MG CAPSULE] Take 20 mg by mouth 2 (two) times a day before meals.       peg 400-propylene glycol (SYSTANE) 0.4-0.3 % Drop [-PROPYLENE GLYCOL (SYSTANE) 0.4-0.3 % DROP] Administer 1 drop to both eyes 4 (four) times a day.      !! pramipexole (MIRAPEX) 0.25 MG tablet Take 0.25-0.5 mg by mouth nightly as needed      !! pramipexole (MIRAPEX) 0.25 MG tablet Take 0.25 mg by mouth At Bedtime       rosuvastatin (CRESTOR) 10 MG tablet  [ROSUVASTATIN (CRESTOR) 10 MG TABLET] Take 10 mg by mouth at bedtime.      senna-docusate (PERICOLACE) 8.6-50 mg tablet [SENNA-DOCUSATE (PERICOLACE) 8.6-50 MG TABLET] Take 1 tablet by mouth 2 (two) times a day. Do not administer if loose stools.  Refills: 0    Associated Diagnoses: SVT (supraventricular tachycardia) (H)      triamcinolone (KENALOG) 0.1 % cream Apply 1 Application topically 3 times daily as needed (rash/itching/dermatitis)        !! - Potential duplicate medications found. Please discuss with provider.            Consults     CARDIOLOGY IP CONSULT  ORTHOPEDIC SURGERY IP CONSULT  PHYSICAL THERAPY ADULT IP CONSULT  OCCUPATIONAL THERAPY ADULT IP CONSULT  PHYSICAL THERAPY ADULT IP CONSULT  OCCUPATIONAL THERAPY ADULT IP CONSULT  NUTRITION SERVICES ADULT IP CONSULT  PHARMACY IP CONSULT  PHYSICAL THERAPY ADULT IP CONSULT  OCCUPATIONAL THERAPY ADULT IP CONSULT  SPEECH LANGUAGE PATH ADULT IP CONSULT    SIGNIFICANT IMAGING FINDINGS     Results for orders placed or performed during the hospital encounter of 10/19/21   XR Pelvis and Hip Right 2 Views    Impression    IMPRESSION: Acute fracture of the proximal right femur involving the intertrochanteric region and the base of the femoral neck. There is varus and apex anterior angulation. Underlying diffuse bony demineralization. Degenerative change in the lumbar   spine.   XR Shoulder Right 3 Views    Impression    IMPRESSION: Mildly impacted fracture of the humeral neck. No dislocation. Diffuse bony demineralization.    Degenerative changes in the visualized spine.    CT Cervical Spine w/o Contrast    Impression    IMPRESSION:  1.  Multilevel degenerative change throughout the cervical spine with no evidence of fracture.   CT Head w/o Contrast    Impression    IMPRESSION:  1.  No CT evidence for acute intracranial process.  2.  Brain atrophy and presumed chronic microvascular ischemic changes as above.   XR Video Swallow with SLP or OT    Impression     "IMPRESSION:    No penetration or aspiration with any consistency.    Please see separately dictated report from speech pathology for additional description, analysis, and management recommendations.     Echocardiogram Complete   Result Value Ref Range    LVEF  60-65%        SIGNIFICANT LABORATORY FINDINGS     Most Recent 3 BMP's:Recent Labs   Lab Test 10/26/21  0548 10/25/21  1218 10/25/21  0911 10/24/21  0559 10/23/21  0506 10/22/21  0536 10/22/21  0536     --   --   --  142  --  140   POTASSIUM 4.1  --   --   --  3.9  --  3.7   CHLORIDE 107  --   --   --  111*  --  109*   CO2 26  --   --   --  24  --  24   BUN 20  --   --   --  39*  --  40*   CR 0.80  --   --  0.90 1.31*   < > 1.57*   ANIONGAP 6  --   --   --  7  --  7   KIN 9.5  --   --   --  9.3  --  9.4    121* 105*  --  108   < > 113    < > = values in this interval not displayed.     Most Recent 3 Hemoglobins:Recent Labs   Lab Test 10/26/21  0548 10/23/21  0506 10/22/21  1246   HGB 9.5* 8.2* 7.1*     Discharge Orders        Reason for your hospital stay    Right hip fracture with surgical repair   right proximal humerus fracture, non-surgical     When to call - Contact Surgeon Team    You may experience symptoms that require follow-up before your scheduled appointment. Refer to the \"Stoplight Tool\" for instructions on when to contact your Surgeon Team if you are concerned about pain control, blood clots, constipation, or if you are unable to urinate.     When to call - Reach out to Urgent Care    If you are not able to reach your Surgeon Team and you need immediate care, go to the Orthopedic Walk-in Clinic or Urgent Care at your Surgeon's office.  Do NOT go to the Emergency Room unless you have shortness of breath, chest pain, or other signs of a medical emergency.     When to call - Reasons to Call 911    Call 911 immediately if you experience sudden-onset chest pain, arm weakness/numbness, slurred speech, or shortness of breath     Symptoms - " Fever Management    A low grade fever can be expected after surgery.  Use acetaminophen (TYLENOL) as needed for fever management.  Contact your Surgeon Team if you have a fever greater than 101.5 F, chills, and/or night sweats.     Symptoms - Constipation management    Constipation (hard, dry bowel movements) is expected after surgery due to the combination of being less active, the anesthetic, and the opioid pain medication.  You can do the following to help reduce constipation:  ~  FLUIDS:  Drink clear liquids (water or Gatorade), or juice (apple/prune).  ~  DIET:  Eat a fiber rich diet.    ~  ACTIVITY:  Get up and move around several times a day.  Increase your activity as you are able.  MEDICATIONS:  Reduce the risk of constipation by starting medications before you are constipated.  You can take Miralax   (1 packet as directed) and/or a stool softener (Senokot 1-2 tablets 1-2 times a day).  If you already have constipation and these medications are not working, you can get magnesium citrate and use as directed.  If you continue to have constipation you can try an over the counter suppository or enema.  Call your Surgeon Team if it has been greater than 3 days since your last bowel movement.     Symptoms - Reduced Urine Output    Changes in the amount of fluids you drank before and after surgery may result in problems urinating.  It is important to stay well-hydrated after surgery and drink plenty of water. If it has been greater than 8 hours since you have urinated despite drinking plenty of water, call your Surgeon Team.     Activity - Exercises to prevent blood clots    Unless otherwise directed by your Surgeon team, perform the following exercises at least three times per day for the first four weeks after surgery to prevent blood clots in your legs: 1) Point and flex your feet (Ankle Pumps), 2) Move your ankle around in big circles, 3) Wiggle your toes, 4) Walk, even for short distances, several times a  day, will help decrease the risk of blood clots.     Comfort and Pain Management - Pain after Surgery    Pain after surgery is normal and expected.  You will have some amount of pain for several weeks after surgery.  Your pain will improve with time.  There are several things you can do to help reduce your pain including: rest, ice, elevation, and using pain medications as needed. Contact your Surgeon Team if you have pain that persists or worsens after surgery despite rest, ice, elevation, and taking your medication(s) as prescribed. Contact your Surgeon Team if you have new numbness, tingling, or weakness in your operative extremity.     Comfort and Pain Management - Swelling after Surgery    Swelling and/or bruising of the surgical extremity is common and may persist for several months after surgery. In addition to frequent icing and elevation, gentle compressive support with an ACE wrap or tubigrip may help with swelling. Apply compression regularly, removing at least twice daily to perform skin checks. Contact your Surgeon Team if your swelling increases and is NOT associated with an increase in your activity level, or if your swelling increases and is associated with redness and pain.     Comfort and Pain Management - Cold therapy    Ice can be used to control swelling and discomfort after surgery. Place a thin towel over your operative site and apply the ice pack overtop. Leave ice pack in place for 20 minutes, then remove for 20 minutes. Repeat this 20 minutes on/20 minutes off routine as often as tolerated.     Medication Instructions - Acetaminophen (TYLENOL) Instructions    You were discharged with acetaminophen (TYLENOL) for pain management after surgery. Acetaminophen most effectively manages pain symptoms when it is taken on a schedule without missing doses (every four, six, or eight hours). Your Provider will prescribe a safe daily dose between 3000 - 4000 mg.  Do NOT exceed this daily dose. Most  "patients use acetaminophen for pain control for the first four weeks after surgery.  You can wean from this medication as your pain decreases.     Follow Up Care    Please follow-up with Dr. Reed in 2 weeks from surgery for staple removal and wound check at [facility]. Call our scheduling line at [phone] to make an appointment if you do not already have one scheduled.     Shower with wound/dressing covered    You must COVER your dressing or incision with saran wrap (or any other non-permeable covering) to allow the incision to remain dry while showering.  You may shower after surgery as long as the surgical wound stays dry. Continue to cover your dressing or incision for showering until your first office visit.  You are strictly prohibited from soaking   or submerging the surgical wound underwater.     Medication instructions -  Anticoagulation - aspirin    Take the aspirin as prescribed for a total of four weeks after surgery.  This is given to help minimize your risk of blood clot.     Comfort and Pain Management - LOWER Extremity Elevation    Swelling is expected for several months after surgery. This type of swelling is usually associated with gravity and activity, and can be improved with elevation.   The best way to do this is to get your \"toes above your nose\" by laying down and placing several pillows lengthwise under your calf and heel. When elevating your leg keep your knee completely straight. Perform this elevation as often as possible especially for the first two weeks after surgery.     General info for SNF    Length of Stay Estimate: Short Term Care: Estimated # of Days <30  Condition at Discharge: Improving  Level of care:skilled   Rehabilitation Potential: Good  Admission H&P remains valid and up-to-date: Yes  Recent Chemotherapy: N/A  Use Nursing Home Standing Orders: Yes     Mantoux instructions    Give two-step Mantoux (PPD) Per Facility Policy Yes     Follow Up and recommended " labs and tests    Follow up with longterm physician.  The following labs/tests are recommended: Hgb in 1 week.     Activity - Up ad karly     Weight bearing status    WBAT     Reason for your hospital stay    Broken hip and arm     Daily weights    Call Provider for weight gain of more than 2 pounds per day or 5 pounds per week.     No CPR- Do NOT Intubate     Physical Therapy Adult Consult    Evaluate and treat as clinically indicated.    Reason: Status Post Hip Surgery     Occupational Therapy Adult Consult    Evaluate and treat as clinically indicated.    Reason: Status Post Hip Surgery     Physical Therapy Adult Consult    Evaluate and treat as clinically indicated.    Reason:  hip and arm fractures     Occupational Therapy Adult Consult    Evaluate and treat as clinically indicated.    Reason:  hip and arm fractures     Wrist/Arm/Hand Supplies Order    Bracing Documentation:   Patient requires the use of the ordered bracing device due to following medical need/condition: humeral fracture    I, the undersigned, certify that the above prescribed supplies are medically necessary for this patient and is both reasonable and necessary in reference to accepted standards of medical and necessary in reference to accepted standards of medical practice in the treatment of this patient's condition and is not prescribed as a convenience.     Walker DME    : DME Documentation: Describe the reason for need to support medical necessity: Impaired gait status post hip surgery. I, the undersigned, certify that the above prescribed supplies are medically necessary for this patient and is both reasonable and necessary in reference to accepted standards of medical practice in the treatment of this patient's condition and is not prescribed as a convenience.     Advance Diet as Tolerated    Follow this diet upon discharge: Orders Placed This Encounter      Snacks/Supplements Adult: Ensure Enlive; With Meals      Snacks/Supplements  Adult: Magic Cup; With Meals      Advance Diet as Tolerated: Regular Diet Adult         Examination   Physical Exam   Temp:  [97.6  F (36.4  C)-98  F (36.7  C)] 98  F (36.7  C)  Pulse:  [71-75] 73  Resp:  [16-18] 16  BP: (158-192)/(68-86) 160/70  SpO2:  [95 %-97 %] 95 %  Wt Readings from Last 1 Encounters:   10/26/21 59.6 kg (131 lb 6.4 oz)       General: in no apparent distress, non-toxic and alert female lying in hospital bed oriented to person and place  HEENT: Head normocephalic atraumatic, oral mucosa moist. Sclerae anicteric  Skin: No rashes or lesions  Extremities: Trace ankle edema bilaterally  Psych: Normal affect, mood euthymic. Tangential, pleasant  Neuro: CNII-XII grossly intact, moving all 4 extremities    Please see EMR for more detailed significant labs, imaging, consultant notes etc.    I, Aditi Crockett MD, personally saw the patient today and spent less than or equal to 30 minutes discharging this patient.    Aditi Crockett MD  [facility]    CC:Reed Shore

## 2021-10-28 NOTE — PLAN OF CARE
Problem: Adult Inpatient Plan of Care  Goal: Plan of Care Review  Outcome: Improving  Goal: Patient-Specific Goal (Individualized)  Outcome: Improving  Goal: Absence of Hospital-Acquired Illness or Injury  Outcome: Improving  Intervention: Identify and Manage Fall Risk  Recent Flowsheet Documentation  Taken 10/27/2021 1601 by Laina Ortega RN  Safety Promotion/Fall Prevention: chair alarm on  Goal: Optimal Comfort and Wellbeing  Outcome: Improving  Intervention: Provide Person-Centered Care  Recent Flowsheet Documentation  Taken 10/27/2021 1601 by Laina Ortega RN  Trust Relationship/Rapport:    care explained    reassurance provided    thoughts/feelings acknowledged  Goal: Readiness for Transition of Care  Outcome: Improving     Problem: Risk for Delirium  Goal: Optimal Coping  Outcome: Improving  Goal: Improved Behavioral Control  Outcome: Improving  Goal: Improved Attention and Thought Clarity  Outcome: Improving  Goal: Improved Sleep  Outcome: Improving     Problem: Pain Acute  Goal: Acceptable Pain Control and Functional Ability  Outcome: Improving   Pt is confused and not oriented,no non verbal indicators of pain.Visited by  this evening.Up in the chair for dinner.Awaits placement.vitals stable.

## 2021-10-28 NOTE — PROGRESS NOTES
Patient discharged to TCU at this time transported by [facility] transportation. Patient stable.  Thania James RN

## 2021-10-28 NOTE — PLAN OF CARE
Problem: Pain Acute  Goal: Acceptable Pain Control and Functional Ability  Outcome: Improving  Left hip discomfort is managed with extra-strength tylenol and ice. Essential oils provided. Less grimacing noted with repositioning.     Problem: Adult Inpatient Plan of Care  Goal: Absence of Hospital-Acquired Illness or Injury   Left hip dressing dry and intact. Ice pack applied. Sling placed to RUE. Pillows for positioning. Martin lift into recliner for all meals. Able to feed self after tray set-up. Consumed 50% of her breakfast. Drinking fluids well. PT/OT scheduled. TCU at discharge.  Thania James RN

## 2021-10-28 NOTE — PLAN OF CARE
Physical Therapy Discharge Summary    Reason for therapy discharge:    Discharged to transitional care facility.    Progress towards therapy goal(s). See goals on Care Plan in [facility] electronic health record for goal details.  Goals partially met.  Barriers to achieving goals:   discharge from facility.    Therapy recommendation(s):    Continued therapy is recommended.  Rationale/Recommendations:  to progress at TCU.  Recommend platform walker.

## 2021-10-28 NOTE — PROGRESS NOTES
Patient is doing about the same today.  Sitting up in a chair watching TV when I visit with her.  She has no complaints other than wants to know who the [employer] are playing this weekend.  Blood pressure running a bit high, I increased her torsemide.  Stable for discharge to transitional care when bed found.  Keira NAJERA full note to follow

## 2021-10-28 NOTE — PROGRESS NOTES
Spiritual Health Note    Spiritual Assessment:     visited patient due to length of stay.  Patient shared about medical condition and history, stating that she fell and was having a hard time moving one side of her body.     Patient shared about family/life history. She shared about her late . She has one son, who is source of support. She was repetitive in her storytelling and had some difficulty with some details of her life background.     Patient comes from [place] background and derives meaning, purpose, and comfort from maria esther. Patient welcomed prayer and expressed appreciation for the visit.      Care Provided:    Introduced self and role of Spiritual Care     Empathic listening and presence     Helped patient in processing of emotions     Facilitated storytelling and life review     Offered prayer     Plan of Care: A  will continue to visit as able or per request by patient/family/staff.        Chaplain Juan Harding MDiv, [facility]

## 2021-10-28 NOTE — PROGRESS NOTES
Care Management Discharge Note    Discharge Date: 10/28/2021     Expected Time of Departure: 1400    Discharge Disposition: Transitional Care    Discharge Services:  TCU    Discharge DME: None    Discharge Transportation: agency    Private pay costs discussed: transportation costs    PAS Confirmation Code: [id number]  Patient/family educated on Medicare website which has current facility and service quality ratings: yes    Education Provided on the Discharge Plan:  yes  Persons Notified of Discharge Plans: sonChad & pt  Patient/Family in Agreement with the Plan: yes    Handoff Referral Completed: Yes    Additional Information:  Received call from Charley at [facility] TCU and she states pt is clinically accepted for admission and she still needs to have financials approved. She will call back once this is done.     Call placed to pt's son, Chad, to provide update. He is agreeable with this TCU placement and requests w/c transport and verbalizes understanding of potential out of pocket expenses.     Update provided to pt's bedside RN.     12:55 PM  Received call from Charley at [facility] stating they can accept pt for admission today.    Paged hopsitalist to request discharge orders asap.  Update provided to pt's bedside RN and charge RN.    Call placed to [facility] and scheduled w/c transport for 1400.    Call placed to pt's son, Chad and provided update.    Call placed to [facility] to inform of TCU location.     Met with pt to discuss discharge planning and provide update on TCU acceptance.    Patsy Morejon RN

## 2021-10-29 NOTE — TELEPHONE ENCOUNTER
Resident admitted from hospital today, has right humerus fracture and post op hip nailing. Discharged on acetaminophen only. RN reports resident is screaming and vomiting due to pain. No falls, ROM intact.     Plan:   1. Oxycodone 2.5 mg PO q6 hours PRN  2. Zofran 4 mg PO TID    KEVIN [facility]

## 2021-10-30 ENCOUNTER — LAB REQUISITION (OUTPATIENT)
Dept: LAB | Facility: CLINIC | Age: 86
End: 2021-10-30

## 2021-10-30 DIAGNOSIS — U07.1 COVID-19: ICD-10-CM

## 2021-10-30 LAB — SARS-COV-2 RNA RESP QL NAA+PROBE: NOT DETECTED

## 2021-10-30 PROCEDURE — U0005 INFEC AGEN DETEC AMPLI PROBE: HCPCS | Performed by: FAMILY MEDICINE

## 2021-10-31 LAB — SARS-COV-2 RNA RESP QL NAA+PROBE: NEGATIVE

## 2021-11-01 ENCOUNTER — TRANSITIONAL CARE UNIT VISIT (OUTPATIENT)
Dept: GERIATRICS | Facility: CLINIC | Age: 86
End: 2021-11-01
Payer: MEDICARE

## 2021-11-01 VITALS
HEIGHT: 66 IN | DIASTOLIC BLOOD PRESSURE: 64 MMHG | TEMPERATURE: 96.9 F | SYSTOLIC BLOOD PRESSURE: 114 MMHG | WEIGHT: 122.3 LBS | OXYGEN SATURATION: 96 % | BODY MASS INDEX: 19.65 KG/M2 | HEART RATE: 104 BPM | RESPIRATION RATE: 16 BRPM

## 2021-11-01 DIAGNOSIS — Z79.899 POLYPHARMACY: ICD-10-CM

## 2021-11-01 DIAGNOSIS — F03.90 DEMENTIA WITHOUT BEHAVIORAL DISTURBANCE, UNSPECIFIED DEMENTIA TYPE: ICD-10-CM

## 2021-11-01 DIAGNOSIS — F41.1 GENERALIZED ANXIETY DISORDER: ICD-10-CM

## 2021-11-01 DIAGNOSIS — F33.0 MILD RECURRENT MAJOR DEPRESSION (H): ICD-10-CM

## 2021-11-01 DIAGNOSIS — E43 SEVERE PROTEIN-CALORIE MALNUTRITION (H): ICD-10-CM

## 2021-11-01 DIAGNOSIS — S42.201D CLOSED FRACTURE OF PROXIMAL END OF RIGHT HUMERUS WITH ROUTINE HEALING, UNSPECIFIED FRACTURE MORPHOLOGY, SUBSEQUENT ENCOUNTER: ICD-10-CM

## 2021-11-01 DIAGNOSIS — N17.9 AKI (ACUTE KIDNEY INJURY) (H): ICD-10-CM

## 2021-11-01 DIAGNOSIS — R54 FRAIL ELDERLY: ICD-10-CM

## 2021-11-01 DIAGNOSIS — S72.144D CLOSED NONDISPLACED INTERTROCHANTERIC FRACTURE OF RIGHT FEMUR WITH ROUTINE HEALING, SUBSEQUENT ENCOUNTER: Primary | ICD-10-CM

## 2021-11-01 DIAGNOSIS — I10 ESSENTIAL HYPERTENSION: ICD-10-CM

## 2021-11-01 DIAGNOSIS — G25.81 RESTLESS LEGS: ICD-10-CM

## 2021-11-01 DIAGNOSIS — K59.01 SLOW TRANSIT CONSTIPATION: ICD-10-CM

## 2021-11-01 DIAGNOSIS — N18.31 CHRONIC KIDNEY DISEASE, STAGE 3A (H): ICD-10-CM

## 2021-11-01 DIAGNOSIS — R29.6 RECURRENT FALLS: ICD-10-CM

## 2021-11-01 DIAGNOSIS — I47.10 SUPRAVENTRICULAR TACHYCARDIA (H): ICD-10-CM

## 2021-11-01 PROBLEM — E22.2 SIADH (SYNDROME OF INAPPROPRIATE ADH PRODUCTION) (H): Status: RESOLVED | Noted: 2021-10-28 | Resolved: 2021-11-01

## 2021-11-01 PROCEDURE — 99310 SBSQ NF CARE HIGH MDM 45: CPT | Performed by: NURSE PRACTITIONER

## 2021-11-01 ASSESSMENT — MIFFLIN-ST. JEOR: SCORE: 976.5

## 2021-11-01 NOTE — PROGRESS NOTES
[facility] TCU Admission  PCP & CLINIC: Reed Shore MD, [facility] [address] / [facility] [id number]  Chief Complaint   Patient presents with     [facility] F/U     [facility] 10/19/2021 - 10/28/2021   Aileen MRN: [id number]. Place of Service where encounter took place:  [facility] (TCU/SNF) [4000] Johanna Washburn  is a 89 year old  (4/14/1928), admitted to the above facility from  [facility]. Hospital stay 10/19/21 through 10/28/21. Admitted to this facility for  rehab, medical management, and nursing care. HPI information obtained from: facility chart records, facility staff, patient report, [facility] chart review, and Care Everywhere [facility] chart review.     Brief Summary of Hospital Course: Sangita presented to [facility] on 10/19 s/p fall and was found to have a right femur fx and right humerus fx. She underwent a right hip ORIF (w/ [place]), and her humerus fx was treated conservatively (sling, PT/OT). She had mild postop blood loss, pSVT, which resolved w/ fluids, IV metoprolol, and 2 units PRBCs. She will follow up w/ ortho in 2 weeks for staple removal.      Updates since admission to transitional care unit: Sangita presented to TCU on 10/28 s/p the above hospitalization. Today, Sangita states that she does not have pain, but nursing endorses pain with movement.  Sangita states that she has a little bit of numbness along her right shoulder but denies any numbness or tingling in her right leg or her right arm/fingers.  She denies any constipation, and nursing confirms that her bowels have been moving today.  She denies any urinary signs or symptoms, fever, shortness of breath, chest pain, headache, dizziness.  She states that her appetite is poor, but denies any nausea or heartburn.  She simply is not interested in food.  She states that she sleeps well, without issue.    CODE STATUS/ADVANCE DIRECTIVES DISCUSSION: DNR/DNI. Patient's living condition: lives in  an assisted living facility. ALLERGIES: Rofecoxib, Sulfa (sulfonamide antibiotics) [sulfa drugs], Augmentin, Demerol, Demerol [meperidine], Minocycline, and Sulfamethoxazole-trimethoprim PAST MEDICAL HISTORY:  has no past medical history on file.. PAST SURGICAL HISTORY:   has a past surgical history that includes Thyroidectomy; appendectomy; Mastectomy (Bilateral); Hysterectomy; Cataract Extraction; and Hip Pinning (Right, 10/20/2021).. FAMILY HISTORY: family history includes Breast Cancer in an other family member; Diabetes in an other family member; Heart Disease in an other family member.. SOCIAL HISTORY:   reports that she has never smoked. She has never used smokeless tobacco. She reports that she does not drink alcohol and does not use drugs.  Post Discharge Medication Reconciliation Status: discharge medications reconciled and changed, per note/orders  Current Outpatient Medications   Medication Sig Dispense Refill     acetaminophen (TYLENOL) 500 MG tablet Take 2 tablets (1,000 mg) by mouth 3 times daily       torsemide (DEMADEX) 5 MG tablet Take 1 tablet (5 mg) by mouth daily       [START ON 11/21/2021] aspirin (ASA) 81 MG chewable tablet Take 1 tablet (81 mg) by mouth daily 30 tablet 0     cephALEXin (KEFLEX) 250 MG capsule Take 250 mg by mouth Every Mon, Wed, Fri Morning For UTI prevention.        enoxaparin ANTICOAGULANT (LOVENOX) 40 MG/0.4ML syringe Inject 0.4 mLs (40 mg) Subcutaneous every 24 hours for 25 days 10 mL 0     ergocalciferol (VITAMIN D2) 50,000 unit capsule Take 50,000 Units by mouth once a week Thursdays       escitalopram (LEXAPRO) 20 MG tablet Take 20 mg by mouth daily       levothyroxine (SYNTHROID, LEVOTHROID) 75 MCG tablet [LEVOTHYROXINE (SYNTHROID, LEVOTHROID) 75 MCG TABLET] Take 75 mcg by mouth daily.       lidocaine (LIDODERM) 5 % patch Place 1 patch onto the skin daily as needed for moderate pain (rib pain) To prevent lidocaine toxicity, patient should be patch free for 12 hrs  "daily.       losartan (COZAAR) 100 MG tablet Take 100 mg by mouth daily       multivitamin, therapeutic (THERA-VIT) TABS tablet Take 1 tablet by mouth daily       NIFEdipine (PROCARDIA XL) 90 MG 24 hr tablet [NIFEDIPINE (PROCARDIA XL) 90 MG 24 HR TABLET] Take 90 mg by mouth daily.       omeprazole (PRILOSEC) 20 MG capsule Take 20 mg by mouth daily       oxyCODONE (ROXICODONE) 5 MG tablet Take 0.5 tablets (2.5 mg) by mouth every 6 hours as needed for pain 30 tablet 0     peg 400-propylene glycol (SYSTANE) 0.4-0.3 % Drop [-PROPYLENE GLYCOL (SYSTANE) 0.4-0.3 % DROP] Administer 1 drop to both eyes 4 (four) times a day.       polyethylene glycol (MIRALAX) 17 GM/Dose powder Take 17 g by mouth daily as needed for constipation 510 g 0     pramipexole (MIRAPEX) 0.25 MG tablet Take 0.25 mg by mouth At Bedtime        rosuvastatin (CRESTOR) 10 MG tablet [ROSUVASTATIN (CRESTOR) 10 MG TABLET] Take 10 mg by mouth at bedtime.       senna-docusate (PERICOLACE) 8.6-50 mg tablet [SENNA-DOCUSATE (PERICOLACE) 8.6-50 MG TABLET] Take 1 tablet by mouth 2 (two) times a day. Do not administer if loose stools.  0     triamcinolone (KENALOG) 0.1 % cream Apply 1 Application topically 3 times daily as needed (rash/itching/dermatitis)        ROS: Limited secondary to cognitive impairment but today pt reports the above and 10 point ROS of systems including Constitutional, Eyes, Respiratory, Cardiovascular, Gastroenterology, Genitourinary, Integumentary, Musculoskeletal, Psychiatric were all negative except for pertinent positives noted in my HPI.    Vitals: /64   Pulse 104   Temp 96.9  F (36.1  C)   Resp 16   Ht 1.676 m (5' 6\")   Wt 55.5 kg (122 lb 4.8 oz)   SpO2 96%   BMI 19.74 kg/m    Exam:  GENERAL APPEARANCE: Alert, in no distress, cooperative.   ENT: Mouth/posterior oropharynx intact w/ moist mucous membranes, hearing acuity [place].  EYES: EOM, conjunctivae, lids, pupils and irises normal, PERRL2.   RESP: Respiratory effort " unlabored, no respiratory distress, Lung sounds clear. On RA.   CV: Auscultation of heart reveals S1, S2, rate is slightly tachycardic and rhythm regular, soft 2/6 systolic murmur, no rub or gallop, Edema 0+ BLE. Peripheral pulses are 2+.  ABDOMEN: Normal bowel sounds, soft, non-tender abdomen, and no masses palpated.  SKIN: Inspection/Palpation of skin and subcutaneous tissue baseline w/ fragility. No wounds/rashes noted. Incision on right hip is covered at this time.   NEURO: CN II-XII at patient's baseline, sensation baseline PPS.  PSYCH: Insight, judgement, and memory are impaired at baseline, affect and mood are happy/engaged.    Lab/Diagnostic data: Recent labs in [facility] reviewed by me today.     ASSESSMENT/PLAN:  Closed nondisplaced intertrochanteric fracture of right femur with routine healing, subsequent encounter  Closed fracture of proximal end of right humerus with routine healing, unspecified fracture morphology, subsequent encounter  Recurrent falls  Chronic kidney disease, stage 3a (H)  JEFFREY (acute kidney injury) (H)  Supraventricular tachycardia (H)  Acute on chronic. Ongoing.    Sangita continues to have a broken arm, and this is not very stable with current sling.  Therefore, provider coordinated care with occupational therapy in order to obtain an immobilizer.  Given Sangita's small stature, special fitting may need to take place.    Right hip ORIF is healing well, but most of Sangita's pain arises from this place.  Will schedule high-dose Tylenol, to mitigate use of opioids.    Noting postoperative anemia, ongoing use of Lovenox, and her projected Aspirin restart later this month, will trend with lab work as noted below.    Noting intermittent tachycardia on nursing records, likely secondary to paroxysmal SVT.  Sangita remains asymptomatic.    We will trend JEFFREY, which appeared to be resolving when patient left the hospital.    Essential hypertension  Mild recurrent major depression (H)  Dementia without  behavioral disturbance, unspecified dementia type (H)  Severe protein-calorie malnutrition (H)  Restless legs  Generalized anxiety disorder  Slow transit constipation  Frail elderly  Polypharmacy  Acute on chronic. Ongoing.    Noting marked polypharmacy, which likely contributed to fall in this case.  Polypharmacy also likely to lead to increased confusion in the setting of Alzheimer's.  Will discontinue non essential medications.    Noting no urologist routinely reviewing this patient, and daily antibiotic use for UTI prophylaxis is no longer recommended.  Will slightly reduce to 3 times per week and monitor patient for urinary symptoms.  May consider full discontinuation at future visit.    Also noting significant malnutrition, which can also contribute to fall/cognition changes.  We will add multivitamin.    Provider coordinated care with nursing surrounding these many medication changes, and nursing required several clarifications in diagnoses attached to medications.  These clarifications are noted below as well.    Follow up w/in 1 week or as needed.    Orders:  1. Discontinue all Voltaren orders.  2. Discontinue PRN Mirapex.  3. Change Tylenol to 1000mg PO TID. Dx: pain.  4. Tylenol 650mg PO QDay PRN. Dx: pain/fever.  5. MVI w/ minerals, 1 tab PO Qday. Dx: malnutrition.  6. Hgb, Platelets, CMP x1 on 11/4. Dx: postop anemia.   7. Discontinue PRN Tums  8. Discontinue PRN Tessalon  9. Discontinue PRN Guaifenesin  10. Discontinue Zofran.  11. Discontinue PRN nitroglycerin (available via MATEO).   12. Decrease Keflex to 250mg PO QDay on M/W/F. Dx: UTI ppx.   13. Decrease Omeprazole to 20mg PO QDay. Dx: GERD.   14. OT to order right arm sling w/ immobilizer, on at all times except cares/shower. Dx: right humerus fx.     Clarified orders for nursing:    VitD = supplement.    Lexapro = CHLOE/MDD.    Synthroid = Hypothyroid.    Losartan = HTN.    Nifedipine = HTN.    Omeprazole = GERD.     Systane = dry eye.    Crestor =  HLD.    Senna = Constipation.     Torsemide = HTN/CHF.    Mirapex = RLS.     Total time spent with patient visit was 35 min including patient visit and review of past records. Greater than 50% of total time spent with counseling and coordinating care with occupational therapy and nursing as noted above.     Electronically signed by:  Dr. Kelley Cook, KEVIN CNP DNP

## 2021-11-01 NOTE — LETTER
11/1/2021        RE: Johanna Washburn  [id number] [place] [address]        [facility] TCU Admission  PCP & CLINIC: Reed Shore MD, [facility] [address] / [place] [address]  Chief Complaint   Patient presents with     [facility] F/U     [facility] 10/19/2021 - 10/28/2021   Aileen MRN: [id number]. Place of Service where encounter took place:  [facility] (TCU/SNF) [4000] Johanna Washburn  is a 89 year old  (4/14/1928), admitted to the above facility from  [facility]. Hospital stay 10/19/21 through 10/28/21. Admitted to this facility for  rehab, medical management, and nursing care. HPI information obtained from: facility chart records, facility staff, patient report, [facility] chart review, and Care Everywhere [facility] chart review.     Brief Summary of Hospital Course: Sangita presented to [facility] on 10/19 s/p fall and was found to have a right femur fx and right humerus fx. She underwent a right hip ORIF (w/ [place]), and her humerus fx was treated conservatively (sling, PT/OT). She had mild postop blood loss, pSVT, which resolved w/ fluids, IV metoprolol, and 2 units PRBCs. She will follow up w/ ortho in 2 weeks for staple removal.      Updates since admission to transitional care unit: Sangita presented to TCU on 10/28 s/p the above hospitalization. Today, Sangita states that she does not have pain, but nursing endorses pain with movement.  Sangita states that she has a little bit of numbness along her right shoulder but denies any numbness or tingling in her right leg or her right arm/fingers.  She denies any constipation, and nursing confirms that her bowels have been moving today.  She denies any urinary signs or symptoms, fever, shortness of breath, chest pain, headache, dizziness.  She states that her appetite is poor, but denies any nausea or heartburn.  She simply is not interested in food.  She states that she sleeps well, without  issue.    CODE STATUS/ADVANCE DIRECTIVES DISCUSSION: DNR/DNI. Patient's living condition: lives in an assisted living facility. ALLERGIES: Rofecoxib, Sulfa (sulfonamide antibiotics) [sulfa drugs], Augmentin, Demerol, Demerol [meperidine], Minocycline, and Sulfamethoxazole-trimethoprim PAST MEDICAL HISTORY:  has no past medical history on file.. PAST SURGICAL HISTORY:   has a past surgical history that includes Thyroidectomy; appendectomy; Mastectomy (Bilateral); Hysterectomy; Cataract Extraction; and Hip Pinning (Right, 10/20/2021).. FAMILY HISTORY: family history includes Breast Cancer in an other family member; Diabetes in an other family member; Heart Disease in an other family member.. SOCIAL HISTORY:   reports that she has never smoked. She has never used smokeless tobacco. She reports that she does not drink alcohol and does not use drugs.  Post Discharge Medication Reconciliation Status: discharge medications reconciled and changed, per note/orders  Current Outpatient Medications   Medication Sig Dispense Refill     acetaminophen (TYLENOL) 500 MG tablet Take 2 tablets (1,000 mg) by mouth 3 times daily       torsemide (DEMADEX) 5 MG tablet Take 1 tablet (5 mg) by mouth daily       [START ON 11/21/2021] aspirin (ASA) 81 MG chewable tablet Take 1 tablet (81 mg) by mouth daily 30 tablet 0     cephALEXin (KEFLEX) 250 MG capsule Take 250 mg by mouth Every Mon, Wed, Fri Morning For UTI prevention.        enoxaparin ANTICOAGULANT (LOVENOX) 40 MG/0.4ML syringe Inject 0.4 mLs (40 mg) Subcutaneous every 24 hours for 25 days 10 mL 0     ergocalciferol (VITAMIN D2) 50,000 unit capsule Take 50,000 Units by mouth once a week Thursdays       escitalopram (LEXAPRO) 20 MG tablet Take 20 mg by mouth daily       levothyroxine (SYNTHROID, LEVOTHROID) 75 MCG tablet [LEVOTHYROXINE (SYNTHROID, LEVOTHROID) 75 MCG TABLET] Take 75 mcg by mouth daily.       lidocaine (LIDODERM) 5 % patch Place 1 patch onto the skin daily as needed for  "moderate pain (rib pain) To prevent lidocaine toxicity, patient should be patch free for 12 hrs daily.       losartan (COZAAR) 100 MG tablet Take 100 mg by mouth daily       multivitamin, therapeutic (THERA-VIT) TABS tablet Take 1 tablet by mouth daily       NIFEdipine (PROCARDIA XL) 90 MG 24 hr tablet [NIFEDIPINE (PROCARDIA XL) 90 MG 24 HR TABLET] Take 90 mg by mouth daily.       omeprazole (PRILOSEC) 20 MG capsule Take 20 mg by mouth daily       oxyCODONE (ROXICODONE) 5 MG tablet Take 0.5 tablets (2.5 mg) by mouth every 6 hours as needed for pain 30 tablet 0     peg 400-propylene glycol (SYSTANE) 0.4-0.3 % Drop [-PROPYLENE GLYCOL (SYSTANE) 0.4-0.3 % DROP] Administer 1 drop to both eyes 4 (four) times a day.       polyethylene glycol (MIRALAX) 17 GM/Dose powder Take 17 g by mouth daily as needed for constipation 510 g 0     pramipexole (MIRAPEX) 0.25 MG tablet Take 0.25 mg by mouth At Bedtime        rosuvastatin (CRESTOR) 10 MG tablet [ROSUVASTATIN (CRESTOR) 10 MG TABLET] Take 10 mg by mouth at bedtime.       senna-docusate (PERICOLACE) 8.6-50 mg tablet [SENNA-DOCUSATE (PERICOLACE) 8.6-50 MG TABLET] Take 1 tablet by mouth 2 (two) times a day. Do not administer if loose stools.  0     triamcinolone (KENALOG) 0.1 % cream Apply 1 Application topically 3 times daily as needed (rash/itching/dermatitis)        ROS: Limited secondary to cognitive impairment but today pt reports the above and 10 point ROS of systems including Constitutional, Eyes, Respiratory, Cardiovascular, Gastroenterology, Genitourinary, Integumentary, Musculoskeletal, Psychiatric were all negative except for pertinent positives noted in my HPI.    Vitals: /64   Pulse 104   Temp 96.9  F (36.1  C)   Resp 16   Ht 1.676 m (5' 6\")   Wt 55.5 kg (122 lb 4.8 oz)   SpO2 96%   BMI 19.74 kg/m    Exam:  GENERAL APPEARANCE: Alert, in no distress, cooperative.   ENT: Mouth/posterior oropharynx intact w/ moist mucous membranes, hearing acuity " [place].  EYES: EOM, conjunctivae, lids, pupils and irises normal, PERRL2.   RESP: Respiratory effort unlabored, no respiratory distress, Lung sounds clear. On RA.   CV: Auscultation of heart reveals S1, S2, rate is slightly tachycardic and rhythm regular, soft 2/6 systolic murmur, no rub or gallop, Edema 0+ BLE. Peripheral pulses are 2+.  ABDOMEN: Normal bowel sounds, soft, non-tender abdomen, and no masses palpated.  SKIN: Inspection/Palpation of skin and subcutaneous tissue baseline w/ fragility. No wounds/rashes noted. Incision on right hip is covered at this time.   NEURO: CN II-XII at patient's baseline, sensation baseline PPS.  PSYCH: Insight, judgement, and memory are impaired at baseline, affect and mood are happy/engaged.    Lab/Diagnostic data: Recent labs in [facility] reviewed by me today.     ASSESSMENT/PLAN:  Closed nondisplaced intertrochanteric fracture of right femur with routine healing, subsequent encounter  Closed fracture of proximal end of right humerus with routine healing, unspecified fracture morphology, subsequent encounter  Recurrent falls  Chronic kidney disease, stage 3a (H)  JEFFREY (acute kidney injury) (H)  Supraventricular tachycardia (H)  Acute on chronic. Ongoing.    Sangita continues to have a broken arm, and this is not very stable with current sling.  Therefore, provider coordinated care with occupational therapy in order to obtain an immobilizer.  Given Sangita's small stature, special fitting may need to take place.    Right hip ORIF is healing well, but most of Sangita's pain arises from this place.  Will schedule high-dose Tylenol, to mitigate use of opioids.    Noting postoperative anemia, ongoing use of Lovenox, and her projected Aspirin restart later this month, will trend with lab work as noted below.    Noting intermittent tachycardia on nursing records, likely secondary to paroxysmal SVT.  Sangita remains asymptomatic.    We will trend JEFFREY, which appeared to be resolving when patient  left the hospital.    Essential hypertension  Mild recurrent major depression (H)  Dementia without behavioral disturbance, unspecified dementia type (H)  Severe protein-calorie malnutrition (H)  Restless legs  Generalized anxiety disorder  Slow transit constipation  Frail elderly  Polypharmacy  Acute on chronic. Ongoing.    Noting marked polypharmacy, which likely contributed to fall in this case.  Polypharmacy also likely to lead to increased confusion in the setting of Alzheimer's.  Will discontinue non essential medications.    Noting no urologist routinely reviewing this patient, and daily antibiotic use for UTI prophylaxis is no longer recommended.  Will slightly reduce to 3 times per week and monitor patient for urinary symptoms.  May consider full discontinuation at future visit.    Also noting significant malnutrition, which can also contribute to fall/cognition changes.  We will add multivitamin.    Provider coordinated care with nursing surrounding these many medication changes, and nursing required several clarifications in diagnoses attached to medications.  These clarifications are noted below as well.    Follow up w/in 1 week or as needed.    Orders:  1. Discontinue all Voltaren orders.  2. Discontinue PRN Mirapex.  3. Change Tylenol to 1000mg PO TID. Dx: pain.  4. Tylenol 650mg PO QDay PRN. Dx: pain/fever.  5. MVI w/ minerals, 1 tab PO Qday. Dx: malnutrition.  6. Hgb, Platelets, CMP x1 on 11/4. Dx: postop anemia.   7. Discontinue PRN Tums  8. Discontinue PRN Tessalon  9. Discontinue PRN Guaifenesin  10. Discontinue Zofran.  11. Discontinue PRN nitroglycerin (available via MATEO).   12. Decrease Keflex to 250mg PO QDay on M/W/F. Dx: UTI ppx.   13. Decrease Omeprazole to 20mg PO QDay. Dx: GERD.   14. OT to order right arm sling w/ immobilizer, on at all times except cares/shower. Dx: right humerus fx.     Clarified orders for nursing:    VitD = supplement.    Lexapro = CHLOE/MDD.    Synthroid =  Hypothyroid.    Losartan = HTN.    Nifedipine = HTN.    Omeprazole = GERD.     Systane = dry eye.    Crestor = HLD.    Senna = Constipation.     Torsemide = HTN/CHF.    Mirapex = RLS.     Total time spent with patient visit was 35 min including patient visit and review of past records. Greater than 50% of total time spent with counseling and coordinating care with occupational therapy and nursing as noted above.     Electronically signed by:  KEVIN Brian CNP DNP                          Sincerely,        KEVIN Oconnor CNP

## 2021-11-02 PROBLEM — Z79.899 POLYPHARMACY: Status: ACTIVE | Noted: 2021-11-02

## 2021-11-02 PROBLEM — R54 FRAIL ELDERLY: Status: ACTIVE | Noted: 2021-11-02

## 2021-11-03 ENCOUNTER — LAB REQUISITION (OUTPATIENT)
Dept: LAB | Facility: CLINIC | Age: 86
End: 2021-11-03

## 2021-11-03 ENCOUNTER — LAB REQUISITION (OUTPATIENT)
Dept: LAB | Facility: CLINIC | Age: 86
End: 2021-11-03
Payer: MEDICARE

## 2021-11-03 DIAGNOSIS — D64.9 ANEMIA, UNSPECIFIED: ICD-10-CM

## 2021-11-03 DIAGNOSIS — U07.1 COVID-19: ICD-10-CM

## 2021-11-03 PROBLEM — E46 PROTEIN-CALORIE MALNUTRITION (H): Status: ACTIVE | Noted: 2021-11-03

## 2021-11-03 PROCEDURE — U0003 INFECTIOUS AGENT DETECTION BY NUCLEIC ACID (DNA OR RNA); SEVERE ACUTE RESPIRATORY SYNDROME CORONAVIRUS 2 (SARS-COV-2) (CORONAVIRUS DISEASE [COVID-19]), AMPLIFIED PROBE TECHNIQUE, MAKING USE OF HIGH THROUGHPUT TECHNOLOGIES AS DESCRIBED BY CMS-2020-01-R: HCPCS | Performed by: FAMILY MEDICINE

## 2021-11-03 RX ORDER — TORSEMIDE 5 MG/1
5 TABLET ORAL DAILY
Start: 2021-11-03 | End: 2021-11-09

## 2021-11-03 RX ORDER — ACETAMINOPHEN 500 MG
1000 TABLET ORAL
Start: 2021-11-03 | End: 2021-12-06

## 2021-11-04 LAB
ALBUMIN SERPL-MCNC: 2.9 G/DL (ref 3.5–5)
ALP SERPL-CCNC: 122 U/L (ref 45–120)
ALT SERPL W P-5'-P-CCNC: <9 U/L (ref 0–45)
ANION GAP SERPL CALCULATED.3IONS-SCNC: 7 MMOL/L (ref 5–18)
AST SERPL W P-5'-P-CCNC: 16 U/L (ref 0–40)
BILIRUB SERPL-MCNC: 0.7 MG/DL (ref 0–1)
BUN SERPL-MCNC: 30 MG/DL (ref 8–28)
CALCIUM SERPL-MCNC: 10 MG/DL (ref 8.5–10.5)
CHLORIDE BLD-SCNC: 102 MMOL/L (ref 98–107)
CO2 SERPL-SCNC: 29 MMOL/L (ref 22–31)
CREAT SERPL-MCNC: 0.99 MG/DL (ref 0.6–1.1)
ERYTHROCYTE [SEDIMENTATION RATE] IN BLOOD BY WESTERGREN METHOD: 20 MM/HR (ref 0–20)
GFR SERPL CREATININE-BSD FRML MDRD: 49 ML/MIN/1.73M2
GLUCOSE BLD-MCNC: 93 MG/DL (ref 70–125)
HGB BLD-MCNC: 10.1 G/DL (ref 11.7–15.7)
PLATELET # BLD AUTO: 334 10E3/UL (ref 150–450)
POTASSIUM BLD-SCNC: 4.6 MMOL/L (ref 3.5–5)
PROT SERPL-MCNC: 5.8 G/DL (ref 6–8)
SARS-COV-2 RNA RESP QL NAA+PROBE: NEGATIVE
SODIUM SERPL-SCNC: 138 MMOL/L (ref 136–145)

## 2021-11-04 PROCEDURE — 85652 RBC SED RATE AUTOMATED: CPT | Performed by: FAMILY MEDICINE

## 2021-11-04 PROCEDURE — 80053 COMPREHEN METABOLIC PANEL: CPT | Performed by: FAMILY MEDICINE

## 2021-11-04 PROCEDURE — 36415 COLL VENOUS BLD VENIPUNCTURE: CPT | Performed by: FAMILY MEDICINE

## 2021-11-04 PROCEDURE — P9604 ONE-WAY ALLOW PRORATED TRIP: HCPCS | Performed by: FAMILY MEDICINE

## 2021-11-04 PROCEDURE — 85018 HEMOGLOBIN: CPT | Performed by: FAMILY MEDICINE

## 2021-11-04 PROCEDURE — 85049 AUTOMATED PLATELET COUNT: CPT | Performed by: FAMILY MEDICINE

## 2021-11-07 NOTE — PROGRESS NOTES
[facility]  Episodic/Acute/Follow-Up  [place] MRN: [id number]. Place of Service where encounter took place:  [facility] (U/SNF) [4000]   Chief Complaint   Patient presents with     RECHECK    HPI: Johanna Washburn  is a 89 year old (4/14/1928), who is being seen today for an episodic care visit.  HPI information obtained from: facility chart records, facility staff, patient report and [facility] chart review. Today's concern is:    Sangita seen today on routine follow-up as she continues to rehab in TCU.  Therapy was able to locate a fair shoulder immobilizer which keeps her entire arm more stable than her prior sling which did not fit.  She denies numbness, tingling, new swelling or pain.  Nursing states that patient has pain with movement, but not with rest.  This makes rehab variable.  Sangita denies any constipation, diarrhea, dysuria, fever, shortness of breath, headaches, dizziness, or any other new signs and symptoms.    Past Medical and Surgical History reviewed in Epic today.  MEDICATIONS:  Current Outpatient Medications   Medication Sig Dispense Refill     acetaminophen (TYLENOL) 500 MG tablet Take 2 tablets (1,000 mg) by mouth 3 times daily       [START ON 11/21/2021] aspirin (ASA) 81 MG chewable tablet Take 1 tablet (81 mg) by mouth daily 30 tablet 0     enoxaparin ANTICOAGULANT (LOVENOX) 40 MG/0.4ML syringe Inject 0.4 mLs (40 mg) Subcutaneous every 24 hours for 25 days 10 mL 0     ergocalciferol (VITAMIN D2) 50,000 unit capsule Take 50,000 Units by mouth once a week Thursdays       escitalopram (LEXAPRO) 20 MG tablet Take 10 mg by mouth daily       levothyroxine (SYNTHROID, LEVOTHROID) 75 MCG tablet [LEVOTHYROXINE (SYNTHROID, LEVOTHROID) 75 MCG TABLET] Take 75 mcg by mouth daily.       lidocaine (LIDODERM) 5 % patch Place 1 patch onto the skin daily as needed for moderate pain (rib pain) To prevent lidocaine toxicity, patient should be patch free for 12  "hrs daily.       losartan (COZAAR) 100 MG tablet Take 100 mg by mouth daily       multivitamin, therapeutic (THERA-VIT) TABS tablet Take 1 tablet by mouth daily       NIFEdipine (PROCARDIA XL) 90 MG 24 hr tablet [NIFEDIPINE (PROCARDIA XL) 90 MG 24 HR TABLET] Take 90 mg by mouth daily.       omeprazole (PRILOSEC) 20 MG capsule Take 20 mg by mouth daily       peg 400-propylene glycol (SYSTANE) 0.4-0.3 % Drop [-PROPYLENE GLYCOL (SYSTANE) 0.4-0.3 % DROP] Administer 1 drop to both eyes 4 (four) times a day.       polyethylene glycol (MIRALAX) 17 GM/Dose powder Take 17 g by mouth daily as needed for constipation 510 g 0     rosuvastatin (CRESTOR) 10 MG tablet [ROSUVASTATIN (CRESTOR) 10 MG TABLET] Take 10 mg by mouth at bedtime.       senna-docusate (PERICOLACE) 8.6-50 mg tablet [SENNA-DOCUSATE (PERICOLACE) 8.6-50 MG TABLET] Take 1 tablet by mouth 2 (two) times a day. Do not administer if loose stools.  0     triamcinolone (KENALOG) 0.1 % cream Apply 1 Application topically 3 times daily as needed (rash/itching/dermatitis)        REVIEW OF SYSTEMS: Limited secondary to cognitive impairment but today pt reports the above and 6 point ROS including Respiratory, CV, GI and , other than that noted in the HPI,  is negative.    Objective: /68   Pulse 84   Temp 97.3  F (36.3  C)   Resp 20   Ht 1.676 m (5' 6\")   Wt 54.7 kg (120 lb 8 oz)   SpO2 95%   BMI 19.45 kg/m    Exam:  GENERAL APPEARANCE: Alert, in no distress, cooperative.   ENT: Mouth/posterior oropharynx intact w/ moist mucous membranes, hearing acuity [place].  EYES: EOM, conjunctivae, lids, pupils and irises normal, PERRL2.   RESP: Respiratory effort unlabored, no respiratory distress, Lung sounds clear. On RA.   CV: Auscultation of heart reveals S1, S2, rate and rhythm regular, no murmur, no rub or gallop, Edema 0+ BLE. Peripheral pulses are 2+.  ABDOMEN: Normal bowel sounds, soft, non-tender abdomen, and no masses palpated.  SKIN: Inspection/Palpation " of skin and subcutaneous tissue baseline w/ fragility. No wounds/rashes noted. Incision to right hip is covered.  MSK: Immobilizer in place appropriately, and right arm us much more secured.  NEURO: CN II-XII at patient's baseline, sensation baseline PPS.  PSYCH: Insight, judgement, and memory are impaired at baseline, affect and mood are happy/engaged.    Labs: Labs done in facility are in EPIC. Please refer to them using EPIC/Care Everywhere.    ASSESSMENT/PLAN:  Closed nondisplaced intertrochanteric fracture of right femur with routine healing, subsequent encounter  Closed fracture of proximal end of right humerus with routine healing, unspecified fracture morphology, subsequent encounter  Recurrent falls  Chronic kidney disease, stage 3a (H)  Dementia without behavioral disturbance, unspecified dementia type (H)  Severe protein-calorie malnutrition (H)  Frail elderly  Polypharmacy  Acute on chronic. Ongoing.    Provider gave verbal order to x-ray right arm, as Ortho has not cleared patient for weightbearing, and follow-up is unknown.    Noting some soft blood pressures, and very low dosing on torsemide.  Orthostasis may be contributory to falls.  Will discontinue torsemide.    Started GDR of Keflex last week, and no bladder signs or symptoms have evolved.  Will discontinue Keflex, as this is no longer standard practice.  Will monitor ongoing for bladder issues.    Noting no concerns with restless leg syndrome, and very small dose of Mirapex.  Will discontinue as this contributes to polypharmacy.    Noting that Lexapro dosing is inappropriate for older adult patients.  Max dosing for older adults is 10 mg.  Will slightly reduce as noted below and monitor for effect.    Provider coordinated care with rehabilitation services and nursing.  Nursing states that they were able to reach orthopedics for a right hip follow-up, they will do this virtually and provider to order x-rays of pelvis, hip, but humerus was not  included here.  Will order as noted below, and ask nursing to inquire around orthopedic for arm.  Therapy states that patient is variable with her rehab secondary to pain with movement, which is inconsistent.  Her cognition also impairs her ability to rehab, and she simply may need more time to recover.  Nursing also reported excoriations to the low back, which can be cleansed and covered with Primapore.  Follow up w/in 1 week or as needed.    Orders:  1. Decrease Lexapro to 10mg PO QDay. Dx: MDD.   2. Discontinue Keflex.   3. Discontinue Torsemide.  4. Discontinue Mirapex.    5. XRs: right hip, pelvis, and right arm. Dx: ortho follow up.   5. Back Excoriation care Qday and PRN:    Clean and pat dry.    Cover w/ primapore.    Total time spent with patient visit was 35 min including patient visit and review of past records. Greater than 50% of total time spent with counseling and coordinating care with nursing and rehabilitation services as noted above.     Electronically signed by:  Dr. Kelley Cook APRN CNP DNP

## 2021-11-08 ENCOUNTER — TRANSITIONAL CARE UNIT VISIT (OUTPATIENT)
Dept: GERIATRICS | Facility: CLINIC | Age: 86
End: 2021-11-08
Payer: MEDICARE

## 2021-11-08 VITALS
SYSTOLIC BLOOD PRESSURE: 124 MMHG | OXYGEN SATURATION: 95 % | HEIGHT: 66 IN | DIASTOLIC BLOOD PRESSURE: 68 MMHG | TEMPERATURE: 97.3 F | WEIGHT: 120.5 LBS | BODY MASS INDEX: 19.37 KG/M2 | RESPIRATION RATE: 20 BRPM | HEART RATE: 84 BPM

## 2021-11-08 DIAGNOSIS — S42.201D CLOSED FRACTURE OF PROXIMAL END OF RIGHT HUMERUS WITH ROUTINE HEALING, UNSPECIFIED FRACTURE MORPHOLOGY, SUBSEQUENT ENCOUNTER: ICD-10-CM

## 2021-11-08 DIAGNOSIS — E43 SEVERE PROTEIN-CALORIE MALNUTRITION (H): ICD-10-CM

## 2021-11-08 DIAGNOSIS — S72.144D CLOSED NONDISPLACED INTERTROCHANTERIC FRACTURE OF RIGHT FEMUR WITH ROUTINE HEALING, SUBSEQUENT ENCOUNTER: Primary | ICD-10-CM

## 2021-11-08 DIAGNOSIS — Z79.899 POLYPHARMACY: ICD-10-CM

## 2021-11-08 DIAGNOSIS — R54 FRAIL ELDERLY: ICD-10-CM

## 2021-11-08 DIAGNOSIS — N18.31 CHRONIC KIDNEY DISEASE, STAGE 3A (H): ICD-10-CM

## 2021-11-08 DIAGNOSIS — F03.90 DEMENTIA WITHOUT BEHAVIORAL DISTURBANCE, UNSPECIFIED DEMENTIA TYPE: ICD-10-CM

## 2021-11-08 DIAGNOSIS — R29.6 RECURRENT FALLS: ICD-10-CM

## 2021-11-08 PROCEDURE — 99310 SBSQ NF CARE HIGH MDM 45: CPT | Performed by: NURSE PRACTITIONER

## 2021-11-08 ASSESSMENT — MIFFLIN-ST. JEOR: SCORE: 968.33

## 2021-11-08 NOTE — NURSING NOTE
1. discontinue keflex  2. discontinue torsemide  3. discontinue mirapex  4. Decrease Lexapro 10mg PO daily Dx: CHLOE/MDD  5. Clean excoriation and cover with primapore Dx: excoriation

## 2021-11-08 NOTE — LETTER
11/8/2021        RE: Johanna Washburn  [address] [place] [address]        [facility]  Episodic/Acute/Follow-Up  [place] MRN: [id number]. Place of Service where encounter took place:  [facility] (U/SNF) [4000]   Chief Complaint   Patient presents with     RECHECK    HPI: Johanna Washburn  is a 89 year old (4/14/1928), who is being seen today for an episodic care visit.  HPI information obtained from: facility chart records, facility staff, patient report and [facility] chart review. Today's concern is:    Sangita seen today on routine follow-up as she continues to rehab in TCU.  Therapy was able to locate a fair shoulder immobilizer which keeps her entire arm more stable than her prior sling which did not fit.  She denies numbness, tingling, new swelling or pain.  Nursing states that patient has pain with movement, but not with rest.  This makes rehab variable.  Sangita denies any constipation, diarrhea, dysuria, fever, shortness of breath, headaches, dizziness, or any other new signs and symptoms.    Past Medical and Surgical History reviewed in Epic today.  MEDICATIONS:  Current Outpatient Medications   Medication Sig Dispense Refill     acetaminophen (TYLENOL) 500 MG tablet Take 2 tablets (1,000 mg) by mouth 3 times daily       [START ON 11/21/2021] aspirin (ASA) 81 MG chewable tablet Take 1 tablet (81 mg) by mouth daily 30 tablet 0     enoxaparin ANTICOAGULANT (LOVENOX) 40 MG/0.4ML syringe Inject 0.4 mLs (40 mg) Subcutaneous every 24 hours for 25 days 10 mL 0     ergocalciferol (VITAMIN D2) 50,000 unit capsule Take 50,000 Units by mouth once a week Thursdays       escitalopram (LEXAPRO) 20 MG tablet Take 10 mg by mouth daily       levothyroxine (SYNTHROID, LEVOTHROID) 75 MCG tablet [LEVOTHYROXINE (SYNTHROID, LEVOTHROID) 75 MCG TABLET] Take 75 mcg by mouth daily.       lidocaine (LIDODERM) 5 % patch Place 1 patch onto the skin daily as needed for  "moderate pain (rib pain) To prevent lidocaine toxicity, patient should be patch free for 12 hrs daily.       losartan (COZAAR) 100 MG tablet Take 100 mg by mouth daily       multivitamin, therapeutic (THERA-VIT) TABS tablet Take 1 tablet by mouth daily       NIFEdipine (PROCARDIA XL) 90 MG 24 hr tablet [NIFEDIPINE (PROCARDIA XL) 90 MG 24 HR TABLET] Take 90 mg by mouth daily.       omeprazole (PRILOSEC) 20 MG capsule Take 20 mg by mouth daily       peg 400-propylene glycol (SYSTANE) 0.4-0.3 % Drop [-PROPYLENE GLYCOL (SYSTANE) 0.4-0.3 % DROP] Administer 1 drop to both eyes 4 (four) times a day.       polyethylene glycol (MIRALAX) 17 GM/Dose powder Take 17 g by mouth daily as needed for constipation 510 g 0     rosuvastatin (CRESTOR) 10 MG tablet [ROSUVASTATIN (CRESTOR) 10 MG TABLET] Take 10 mg by mouth at bedtime.       senna-docusate (PERICOLACE) 8.6-50 mg tablet [SENNA-DOCUSATE (PERICOLACE) 8.6-50 MG TABLET] Take 1 tablet by mouth 2 (two) times a day. Do not administer if loose stools.  0     triamcinolone (KENALOG) 0.1 % cream Apply 1 Application topically 3 times daily as needed (rash/itching/dermatitis)        REVIEW OF SYSTEMS: Limited secondary to cognitive impairment but today pt reports the above and 6 point ROS including Respiratory, CV, GI and , other than that noted in the HPI,  is negative.    Objective: /68   Pulse 84   Temp 97.3  F (36.3  C)   Resp 20   Ht 1.676 m (5' 6\")   Wt 54.7 kg (120 lb 8 oz)   SpO2 95%   BMI 19.45 kg/m    Exam:  GENERAL APPEARANCE: Alert, in no distress, cooperative.   ENT: Mouth/posterior oropharynx intact w/ moist mucous membranes, hearing acuity [place].  EYES: EOM, conjunctivae, lids, pupils and irises normal, PERRL2.   RESP: Respiratory effort unlabored, no respiratory distress, Lung sounds clear. On RA.   CV: Auscultation of heart reveals S1, S2, rate and rhythm regular, no murmur, no rub or gallop, Edema 0+ BLE. Peripheral pulses are 2+.  ABDOMEN: Normal " bowel sounds, soft, non-tender abdomen, and no masses palpated.  SKIN: Inspection/Palpation of skin and subcutaneous tissue baseline w/ fragility. No wounds/rashes noted. Incision to right hip is covered.  MSK: Immobilizer in place appropriately, and right arm us much more secured.  NEURO: CN II-XII at patient's baseline, sensation baseline PPS.  PSYCH: Insight, judgement, and memory are impaired at baseline, affect and mood are happy/engaged.    Labs: Labs done in facility are in EPIC. Please refer to them using [employer]/Care Everywhere.    ASSESSMENT/PLAN:  Closed nondisplaced intertrochanteric fracture of right femur with routine healing, subsequent encounter  Closed fracture of proximal end of right humerus with routine healing, unspecified fracture morphology, subsequent encounter  Recurrent falls  Chronic kidney disease, stage 3a (H)  Dementia without behavioral disturbance, unspecified dementia type (H)  Severe protein-calorie malnutrition (H)  Frail elderly  Polypharmacy  Acute on chronic. Ongoing.    Provider gave verbal order to x-ray right arm, as Ortho has not cleared patient for weightbearing, and follow-up is unknown.    Noting some soft blood pressures, and very low dosing on torsemide.  Orthostasis may be contributory to falls.  Will discontinue torsemide.    Started GDR of Keflex last week, and no bladder signs or symptoms have evolved.  Will discontinue Keflex, as this is no longer standard practice.  Will monitor ongoing for bladder issues.    Noting no concerns with restless leg syndrome, and very small dose of Mirapex.  Will discontinue as this contributes to polypharmacy.    Noting that Lexapro dosing is inappropriate for older adult patients.  Max dosing for older adults is 10 mg.  Will slightly reduce as noted below and monitor for effect.    Provider coordinated care with rehabilitation services and nursing.  Nursing states that they were able to reach orthopedics for a right hip follow-up, they  will do this virtually and provider to order x-rays of pelvis, hip, but humerus was not included here.  Will order as noted below, and ask nursing to inquire around orthopedic for arm.  Therapy states that patient is variable with her rehab secondary to pain with movement, which is inconsistent.  Her cognition also impairs her ability to rehab, and she simply may need more time to recover.  Nursing also reported excoriations to the low back, which can be cleansed and covered with Primapore.  Follow up w/in 1 week or as needed.    Orders:  1. Decrease Lexapro to 10mg PO QDay. Dx: MDD.   2. Discontinue Keflex.   3. Discontinue Torsemide.  4. Discontinue Mirapex.    5. XRs: right hip, pelvis, and right arm. Dx: ortho follow up.   5. Back Excoriation care Qday and PRN:    Clean and pat dry.    Cover w/ primapore.    Total time spent with patient visit was 35 min including patient visit and review of past records. Greater than 50% of total time spent with counseling and coordinating care with nursing and rehabilitation services as noted above.     Electronically signed by:  Dr. Kelley Cook APRN CNP DNP          Sincerely,        KEVIN Oconnor CNP

## 2021-11-08 NOTE — LETTER
11/8/2021        RE: Johanna Washburn  [address]  [place] [address]        No notes on file      Sincerely,        Kelley Cook, KEVIN CNP

## 2021-11-12 PROBLEM — M80.00XD AGE-RELATED OSTEOPOROSIS WITH CURRENT PATHOLOGICAL FRACTURE WITH ROUTINE HEALING, SUBSEQUENT ENCOUNTER: Status: ACTIVE | Noted: 2021-10-28

## 2021-11-15 ENCOUNTER — TRANSITIONAL CARE UNIT VISIT (OUTPATIENT)
Dept: GERIATRICS | Facility: CLINIC | Age: 86
End: 2021-11-15
Payer: MEDICARE

## 2021-11-15 VITALS
RESPIRATION RATE: 16 BRPM | HEIGHT: 66 IN | DIASTOLIC BLOOD PRESSURE: 65 MMHG | BODY MASS INDEX: 19.08 KG/M2 | TEMPERATURE: 97.7 F | WEIGHT: 118.7 LBS | SYSTOLIC BLOOD PRESSURE: 114 MMHG | OXYGEN SATURATION: 94 % | HEART RATE: 83 BPM

## 2021-11-15 DIAGNOSIS — E46 PROTEIN-CALORIE MALNUTRITION, UNSPECIFIED SEVERITY (H): ICD-10-CM

## 2021-11-15 DIAGNOSIS — S72.144D CLOSED NONDISPLACED INTERTROCHANTERIC FRACTURE OF RIGHT FEMUR WITH ROUTINE HEALING, SUBSEQUENT ENCOUNTER: Primary | ICD-10-CM

## 2021-11-15 DIAGNOSIS — F32.A DEPRESSION, UNSPECIFIED DEPRESSION TYPE: ICD-10-CM

## 2021-11-15 DIAGNOSIS — D50.0 BLOOD LOSS ANEMIA: ICD-10-CM

## 2021-11-15 DIAGNOSIS — I10 ESSENTIAL HYPERTENSION: ICD-10-CM

## 2021-11-15 DIAGNOSIS — R29.6 RECURRENT FALLS: ICD-10-CM

## 2021-11-15 DIAGNOSIS — S42.201D CLOSED FRACTURE OF PROXIMAL END OF RIGHT HUMERUS WITH ROUTINE HEALING, UNSPECIFIED FRACTURE MORPHOLOGY, SUBSEQUENT ENCOUNTER: ICD-10-CM

## 2021-11-15 PROCEDURE — 99305 1ST NF CARE MODERATE MDM 35: CPT | Performed by: FAMILY MEDICINE

## 2021-11-15 ASSESSMENT — MIFFLIN-ST. JEOR: SCORE: 960.17

## 2021-11-15 NOTE — LETTER
11/15/2021        RE: Johanna Washburn  [address]  [place] [address]        [place] [facility]  PRIMARY CARE PROVIDER AND CLINIC:  Reed Shore MD, [facility] [address] / [facility] [address]  Chief Complaint   Patient presents with     Hospital F/U     [place] Medical Record Number:  [id number]  Place of Service where encounter took place:  [facility] (TCU/SNF) [4000]    Johanna Washburn  is a 89 year old  (4/14/1928), admitted to the above facility from  [facility]. Hospital stay 10/19/21 through 10/28/21..  Admitted to this facility for  rehab, medical management and nursing care.    HPI:    HPI information obtained from: facility staff, patient report and [facility] chart review.   Brief Summary of Hospital Course:   - Pt had a fall which resutled in Closed nondisplaced intertrochanteric fracture of right femur and Closed fracture of proximal end of right humerus, s/p femur ORIF and medical managed with sling to humerus.   - hospital stay c/w acute blood loss anemia and pSVT, st/p two units blood transfusions and  IVF hydration and metoprolol IV.       Updates on Status Since Skilled nursing Admission:   - many med changes- see A/P.       Today:  - Right hip fx: pt reports no pain in the hip area.   - right humerus fx: reports it is in sling all the time,  - rehab: reports it is going well.   - Depression: reports mood is ok.   =====================================    CODE STATUS/ADVANCE DIRECTIVES DISCUSSION:   DNR / DNI  Patient's living condition: lives in an assisted living facility  ALLERGIES: Rofecoxib, Sulfa (sulfonamide antibiotics) [sulfa drugs], Augmentin, Demerol, Demerol [meperidine], Minocycline, and Sulfamethoxazole-trimethoprim  PAST MEDICAL HISTORY: dementia, HTN, femur fx, RLS, depression, UTI  PAST SURGICAL HISTORY:   has a past surgical history that includes Thyroidectomy; appendectomy; Mastectomy (Bilateral);  Hysterectomy; Cataract Extraction; and Hip Pinning (Right, 10/20/2021).  FAMILY HISTORY: family history includes Breast Cancer in an other family member; Diabetes in an other family member; Heart Disease in an other family member.  SOCIAL HISTORY:   reports that she has never smoked. She has never used smokeless tobacco. She reports that she does not drink alcohol and does not use drugs.    Post Discharge Medication Reconciliation Status: discharge medications reconciled and changed, per note/orders  Current Outpatient Medications   Medication Sig Dispense Refill     acetaminophen (TYLENOL) 500 MG tablet Take 2 tablets (1,000 mg) by mouth 3 times daily       [START ON 11/21/2021] aspirin (ASA) 81 MG chewable tablet Take 1 tablet (81 mg) by mouth daily 30 tablet 0     enoxaparin ANTICOAGULANT (LOVENOX) 40 MG/0.4ML syringe Inject 0.4 mLs (40 mg) Subcutaneous every 24 hours for 25 days 10 mL 0     ergocalciferol (VITAMIN D2) 50,000 unit capsule Take 50,000 Units by mouth once a week Thursdays       escitalopram (LEXAPRO) 20 MG tablet Take 10 mg by mouth daily       levothyroxine (SYNTHROID, LEVOTHROID) 75 MCG tablet [LEVOTHYROXINE (SYNTHROID, LEVOTHROID) 75 MCG TABLET] Take 75 mcg by mouth daily.       lidocaine (LIDODERM) 5 % patch Place 1 patch onto the skin daily as needed for moderate pain (rib pain) To prevent lidocaine toxicity, patient should be patch free for 12 hrs daily.       losartan (COZAAR) 100 MG tablet Take 100 mg by mouth daily       multivitamin, therapeutic (THERA-VIT) TABS tablet Take 1 tablet by mouth daily       NIFEdipine (PROCARDIA XL) 90 MG 24 hr tablet [NIFEDIPINE (PROCARDIA XL) 90 MG 24 HR TABLET] Take 90 mg by mouth daily.       omeprazole (PRILOSEC) 20 MG capsule Take 20 mg by mouth daily       peg 400-propylene glycol (SYSTANE) 0.4-0.3 % Drop [-PROPYLENE GLYCOL (SYSTANE) 0.4-0.3 % DROP] Administer 1 drop to both eyes 4 (four) times a day.       polyethylene glycol (MIRALAX) 17  "GM/Dose powder Take 17 g by mouth daily as needed for constipation 510 g 0     rosuvastatin (CRESTOR) 10 MG tablet [ROSUVASTATIN (CRESTOR) 10 MG TABLET] Take 10 mg by mouth at bedtime.       senna-docusate (PERICOLACE) 8.6-50 mg tablet [SENNA-DOCUSATE (PERICOLACE) 8.6-50 MG TABLET] Take 1 tablet by mouth 2 (two) times a day. Do not administer if loose stools.  0     triamcinolone (KENALOG) 0.1 % cream Apply 1 Application topically 3 times daily as needed (rash/itching/dermatitis)        ROS: very Limited secondary to cognitive impairment but today pt reports- see HPI    Vitals:  /65   Pulse 83   Temp 97.7  F (36.5  C)   Resp 16   Ht 1.676 m (5' 6\")   Wt 53.8 kg (118 lb 11.2 oz)   SpO2 94%   BMI 19.16 kg/m    Exam:  GENERAL APPEARANCE:  in no distress, cooperative  ENT:  Mouth and posterior oropharynx normal, moist mucous membranes, oral mucosa moist, no lesion noted.   EYES:  EOMI, Pupil rounded and equal.  RESP:  lungs clear to auscultation   CV:  S1S2 audible, regular HR, no murmur appreciated.   ABDOMEN:  soft, NT/ND, BS audible. no mass appreciated on palpation.   M/S:   RUE in sling.   SKIN:  Alopecia.   NEURO:   No NFD appreciated on observation. Hand  5/5 b/l  PSYCH:  Pleasant, speech clear, tangential thoughts.  AAOx person only. Think she is at [employer], knows day and month but not year.       Lab/Diagnostic data: Reviewed in the chart and EHR.          ASSESSMENT/PLAN:  ------------------------------  * Pt had a fall which resutled in Closed nondisplaced intertrochanteric fracture of right femur and Closed fracture of proximal end of right humerus, s/p femur ORIF and medical managed with sling to humerus.   * hospital stay c/w acute blood loss anemia and pSVT, st/p two units blood transfusions and  IVF hydration and metoprolol IV.   - - Started rehab program, making a progress, continue until desired goal is achieved.   - Analgesia optimal  - Followed by Orthopedic Team.   - Hb " trending up    Essential HTN: given BP on the soft side,  And possibly contributing to falls, torsemide discontinued (11/8). compensated      PCM  (H): Body mass index is 19.16 kg/m . started on MV.      CKD3a, GFR 39 ml.min: - Avoid nephrotoxic  medications. Renally dose medications. Monitor electrolytes, and dehydration status.     Chronic recurrent UTI:  Historically has been on Keflex 250 mg daily for prophylaxis. This changed to 3x/wk upon TCU admission, then stopped. . Defer to Urology further management. Prophylactic antimicrobial increase resistance and AE, however.     Depression: lexapro reduced to 10 mg. Tolerating well so far  RLS: Mirapex discontinued. No concern.       Order: See above, otherwise, continue the rest of the current POC.       Electronically signed by:  Caitlin Melgar MD                Sincerely,        Caitlin Melgar MD

## 2021-11-15 NOTE — PROGRESS NOTES
[place] [facility]  PRIMARY CARE PROVIDER AND CLINIC:  Reed Shore MD, [facility] [address] / [facility] [address]  Chief Complaint   Patient presents with     Hospital F/U     [place] Medical Record Number:  [id number]  Place of Service where encounter took place:  [facility] (TCU/SNF) [4000]    Johanna Washburn  is a 89 year old  (4/14/1928), admitted to the above facility from  [facility]. Hospital stay 10/19/21 through 10/28/21..  Admitted to this facility for  rehab, medical management and nursing care.    HPI:    HPI information obtained from: facility staff, patient report and [facility] chart review.   Brief Summary of Hospital Course:   - Pt had a fall which resutled in Closed nondisplaced intertrochanteric fracture of right femur and Closed fracture of proximal end of right humerus, s/p femur ORIF and medical managed with sling to humerus.   - hospital stay c/w acute blood loss anemia and pSVT, st/p two units blood transfusions and  IVF hydration and metoprolol IV.       Updates on Status Since Skilled nursing Admission:   - many med changes- see A/P.       Today:  - Right hip fx: pt reports no pain in the hip area.   - right humerus fx: reports it is in sling all the time,  - rehab: reports it is going well.   - Depression: reports mood is ok.   =====================================    CODE STATUS/ADVANCE DIRECTIVES DISCUSSION:   DNR / DNI  Patient's living condition: lives in an assisted living facility  ALLERGIES: Rofecoxib, Sulfa (sulfonamide antibiotics) [sulfa drugs], Augmentin, Demerol, Demerol [meperidine], Minocycline, and Sulfamethoxazole-trimethoprim  PAST MEDICAL HISTORY: dementia, HTN, femur fx, RLS, depression, UTI  PAST SURGICAL HISTORY:   has a past surgical history that includes Thyroidectomy; appendectomy; Mastectomy (Bilateral); Hysterectomy; Cataract Extraction; and Hip Pinning (Right, 10/20/2021).  FAMILY HISTORY: family  history includes Breast Cancer in an other family member; Diabetes in an other family member; Heart Disease in an other family member.  SOCIAL HISTORY:   reports that she has never smoked. She has never used smokeless tobacco. She reports that she does not drink alcohol and does not use drugs.    Post Discharge Medication Reconciliation Status: discharge medications reconciled and changed, per note/orders  Current Outpatient Medications   Medication Sig Dispense Refill     acetaminophen (TYLENOL) 500 MG tablet Take 2 tablets (1,000 mg) by mouth 3 times daily       [START ON 11/21/2021] aspirin (ASA) 81 MG chewable tablet Take 1 tablet (81 mg) by mouth daily 30 tablet 0     enoxaparin ANTICOAGULANT (LOVENOX) 40 MG/0.4ML syringe Inject 0.4 mLs (40 mg) Subcutaneous every 24 hours for 25 days 10 mL 0     ergocalciferol (VITAMIN D2) 50,000 unit capsule Take 50,000 Units by mouth once a week Thursdays       escitalopram (LEXAPRO) 20 MG tablet Take 10 mg by mouth daily       levothyroxine (SYNTHROID, LEVOTHROID) 75 MCG tablet [LEVOTHYROXINE (SYNTHROID, LEVOTHROID) 75 MCG TABLET] Take 75 mcg by mouth daily.       lidocaine (LIDODERM) 5 % patch Place 1 patch onto the skin daily as needed for moderate pain (rib pain) To prevent lidocaine toxicity, patient should be patch free for 12 hrs daily.       losartan (COZAAR) 100 MG tablet Take 100 mg by mouth daily       multivitamin, therapeutic (THERA-VIT) TABS tablet Take 1 tablet by mouth daily       NIFEdipine (PROCARDIA XL) 90 MG 24 hr tablet [NIFEDIPINE (PROCARDIA XL) 90 MG 24 HR TABLET] Take 90 mg by mouth daily.       omeprazole (PRILOSEC) 20 MG capsule Take 20 mg by mouth daily       peg 400-propylene glycol (SYSTANE) 0.4-0.3 % Drop [-PROPYLENE GLYCOL (SYSTANE) 0.4-0.3 % DROP] Administer 1 drop to both eyes 4 (four) times a day.       polyethylene glycol (MIRALAX) 17 GM/Dose powder Take 17 g by mouth daily as needed for constipation 510 g 0     rosuvastatin (CRESTOR)  "10 MG tablet [ROSUVASTATIN (CRESTOR) 10 MG TABLET] Take 10 mg by mouth at bedtime.       senna-docusate (PERICOLACE) 8.6-50 mg tablet [SENNA-DOCUSATE (PERICOLACE) 8.6-50 MG TABLET] Take 1 tablet by mouth 2 (two) times a day. Do not administer if loose stools.  0     triamcinolone (KENALOG) 0.1 % cream Apply 1 Application topically 3 times daily as needed (rash/itching/dermatitis)        ROS: very Limited secondary to cognitive impairment but today pt reports- see HPI    Vitals:  /65   Pulse 83   Temp 97.7  F (36.5  C)   Resp 16   Ht 1.676 m (5' 6\")   Wt 53.8 kg (118 lb 11.2 oz)   SpO2 94%   BMI 19.16 kg/m    Exam:  GENERAL APPEARANCE:  in no distress, cooperative  ENT:  Mouth and posterior oropharynx normal, moist mucous membranes, oral mucosa moist, no lesion noted.   EYES:  EOMI, Pupil rounded and equal.  RESP:  lungs clear to auscultation   CV:  S1S2 audible, regular HR, no murmur appreciated.   ABDOMEN:  soft, NT/ND, BS audible. no mass appreciated on palpation.   M/S:   RUE in sling.   SKIN:  Alopecia.   NEURO:   No NFD appreciated on observation. Hand  5/5 b/l  PSYCH:  Pleasant, speech clear, tangential thoughts.  AAOx person only. Think she is at [place], knows day and month but not year.       Lab/Diagnostic data: Reviewed in the chart and EHR.          ASSESSMENT/PLAN:  ------------------------------  * Pt had a fall which resutled in Closed nondisplaced intertrochanteric fracture of right femur and Closed fracture of proximal end of right humerus, s/p femur ORIF and medical managed with sling to humerus.   * hospital stay c/w acute blood loss anemia and pSVT, st/p two units blood transfusions and  IVF hydration and metoprolol IV.   - - Started rehab program, making a progress, continue until desired goal is achieved.   - Analgesia optimal  - Followed by Orthopedic Team.   - Hb trending up    Essential HTN: given BP on the soft side,  And possibly contributing to falls, torsemide " discontinued (11/8). compensated      PCM  (H): Body mass index is 19.16 kg/m . started on MV.      CKD3a, GFR 39 ml.min: - Avoid nephrotoxic  medications. Renally dose medications. Monitor electrolytes, and dehydration status.     Chronic recurrent UTI:  Historically has been on Keflex 250 mg daily for prophylaxis. This changed to 3x/wk upon TCU admission, then stopped. . Defer to Urology further management. Prophylactic antimicrobial increase resistance and AE, however.     Depression: lexapro reduced to 10 mg. Tolerating well so far  RLS: Mirapex discontinued. No concern.       Order: See above, otherwise, continue the rest of the current POC.       Electronically signed by:  Caitlin Melgar MD

## 2021-11-16 ENCOUNTER — TRANSITIONAL CARE UNIT VISIT (OUTPATIENT)
Dept: GERIATRICS | Facility: CLINIC | Age: 86
End: 2021-11-16
Payer: MEDICARE

## 2021-11-16 VITALS
DIASTOLIC BLOOD PRESSURE: 78 MMHG | OXYGEN SATURATION: 95 % | SYSTOLIC BLOOD PRESSURE: 118 MMHG | BODY MASS INDEX: 19.08 KG/M2 | WEIGHT: 118.7 LBS | RESPIRATION RATE: 18 BRPM | HEIGHT: 66 IN | TEMPERATURE: 98.2 F | HEART RATE: 76 BPM

## 2021-11-16 DIAGNOSIS — R54 FRAIL ELDERLY: ICD-10-CM

## 2021-11-16 DIAGNOSIS — N18.31 CHRONIC KIDNEY DISEASE, STAGE 3A (H): ICD-10-CM

## 2021-11-16 DIAGNOSIS — Z79.899 POLYPHARMACY: ICD-10-CM

## 2021-11-16 DIAGNOSIS — E43 SEVERE PROTEIN-CALORIE MALNUTRITION (H): ICD-10-CM

## 2021-11-16 DIAGNOSIS — R29.6 RECURRENT FALLS: ICD-10-CM

## 2021-11-16 DIAGNOSIS — F03.90 DEMENTIA WITHOUT BEHAVIORAL DISTURBANCE, UNSPECIFIED DEMENTIA TYPE: ICD-10-CM

## 2021-11-16 DIAGNOSIS — S72.144D CLOSED NONDISPLACED INTERTROCHANTERIC FRACTURE OF RIGHT FEMUR WITH ROUTINE HEALING, SUBSEQUENT ENCOUNTER: Primary | ICD-10-CM

## 2021-11-16 DIAGNOSIS — S42.201D CLOSED FRACTURE OF PROXIMAL END OF RIGHT HUMERUS WITH ROUTINE HEALING, UNSPECIFIED FRACTURE MORPHOLOGY, SUBSEQUENT ENCOUNTER: ICD-10-CM

## 2021-11-16 PROCEDURE — 99309 SBSQ NF CARE MODERATE MDM 30: CPT | Performed by: NURSE PRACTITIONER

## 2021-11-16 ASSESSMENT — MIFFLIN-ST. JEOR: SCORE: 960.17

## 2021-11-16 NOTE — PROGRESS NOTES
[facility] [place] [facility]  Episodic/Acute/Follow-Up  [place] MRN: [id number]. Place of Service where encounter took place:  [facility] (U/SNF) [4000]   Chief Complaint   Patient presents with     RECHECK    HPI: Johanna Washburn  is a 89 year old (4/14/1928), who is being seen today for an episodic care visit.  HPI information obtained from: facility chart records, facility staff, patient report and [facility] chart review. Today's concern is:    Sangita seen today on routine follow-up as she continues to rehab in TCU.  She is having a good day, states that sometimes her hip gives her some pain.  She states when resting she feels really good.  She says her appetite is good, she denies headache, dizziness, shortness of breath, fever, bowel or bladder problems, or new edema.    Past Medical and Surgical History reviewed in Epic today.  MEDICATIONS:  Current Outpatient Medications   Medication Sig Dispense Refill     acetaminophen (TYLENOL) 500 MG tablet Take 2 tablets (1,000 mg) by mouth 3 times daily       [START ON 11/21/2021] aspirin (ASA) 81 MG chewable tablet Take 1 tablet (81 mg) by mouth daily 30 tablet 0     enoxaparin ANTICOAGULANT (LOVENOX) 40 MG/0.4ML syringe Inject 0.4 mLs (40 mg) Subcutaneous every 24 hours for 25 days 10 mL 0     ergocalciferol (VITAMIN D2) 50,000 unit capsule Take 50,000 Units by mouth once a week Thursdays       escitalopram (LEXAPRO) 20 MG tablet Take 10 mg by mouth daily       levothyroxine (SYNTHROID, LEVOTHROID) 75 MCG tablet [LEVOTHYROXINE (SYNTHROID, LEVOTHROID) 75 MCG TABLET] Take 75 mcg by mouth daily.       lidocaine (LIDODERM) 5 % patch Place 1 patch onto the skin daily as needed for moderate pain (rib pain) To prevent lidocaine toxicity, patient should be patch free for 12 hrs daily.       losartan (COZAAR) 100 MG tablet Take 100 mg by mouth daily       multivitamin, therapeutic (THERA-VIT) TABS tablet Take 1 tablet by mouth daily        "NIFEdipine (PROCARDIA XL) 90 MG 24 hr tablet [NIFEDIPINE (PROCARDIA XL) 90 MG 24 HR TABLET] Take 90 mg by mouth daily.       omeprazole (PRILOSEC) 20 MG capsule Take 20 mg by mouth daily       peg 400-propylene glycol (SYSTANE) 0.4-0.3 % Drop [-PROPYLENE GLYCOL (SYSTANE) 0.4-0.3 % DROP] Administer 1 drop to both eyes 4 (four) times a day.       polyethylene glycol (MIRALAX) 17 GM/Dose powder Take 17 g by mouth daily as needed for constipation 510 g 0     rosuvastatin (CRESTOR) 10 MG tablet [ROSUVASTATIN (CRESTOR) 10 MG TABLET] Take 10 mg by mouth at bedtime.       senna-docusate (PERICOLACE) 8.6-50 mg tablet [SENNA-DOCUSATE (PERICOLACE) 8.6-50 MG TABLET] Take 1 tablet by mouth 2 (two) times a day. Do not administer if loose stools.  0     triamcinolone (KENALOG) 0.1 % cream Apply 1 Application topically 3 times daily as needed (rash/itching/dermatitis)        REVIEW OF SYSTEMS: Limited secondary to cognitive impairment but today pt reports the above and 6 point ROS including Respiratory, CV, GI and , other than that noted in the HPI,  is negative.    Objective: /78   Pulse 76   Temp 98.2  F (36.8  C)   Resp 18   Ht 1.676 m (5' 6\")   Wt 53.8 kg (118 lb 11.2 oz)   SpO2 95%   BMI 19.16 kg/m    Exam:  GENERAL APPEARANCE: Alert, in no distress, cooperative.   ENT: Mouth/posterior oropharynx intact w/ moist mucous membranes, hearing acuity [place].  EYES: EOM, conjunctivae, lids, pupils and irises normal, PERRL2.   RESP: Respiratory effort unlabored, no respiratory distress, Lung sounds clear. On RA.   CV: Auscultation of heart reveals S1, S2, rate and rhythm regular, no murmur, no rub or gallop, Edema 0+ BLE. Peripheral pulses are 2+.  ABDOMEN: Normal bowel sounds, soft, non-tender abdomen, and no masses palpated.  SKIN: Inspection/Palpation of skin and subcutaneous tissue baseline w/ fragility. No wounds/rashes noted. Incision to right hip is covered.  MSK: Immobilizer in place appropriately, and right " arm us much more secured.  NEURO: CN II-XII at patient's baseline, sensation baseline PPS.  PSYCH: Insight, judgement, and memory are impaired at baseline, affect and mood are happy/engaged.    Labs: Labs done in facility are in EPIC. Please refer to them using [employer]/Care Everywhere.    ASSESSMENT/PLAN:  Closed nondisplaced intertrochanteric fracture of right femur with routine healing, subsequent encounter  Closed fracture of proximal end of right humerus with routine healing, unspecified fracture morphology, subsequent encounter  Recurrent falls  Chronic kidney disease, stage 3a (H)  Dementia without behavioral disturbance, unspecified dementia type (H)  Severe protein-calorie malnutrition (H)  Frail elderly  Polypharmacy  Acute on chronic. Ongoing.    Orthopedic follow-up was appropriate without any issues with healing.  Right arm is still showing same fractures with delayed healing.  Immobilizer is helpful for this.    Although she states her appetite is good, we note some mild weight loss since her surgery, and decreased intake.  She is on dietary supplementation and she has done well with her polypharmacy reduction.    No significant pain to hip, even with palpation, and no significant right arm or right leg swelling.    Therapy and nursing states that Sangita is doing well, but very slowly progressing.  Discharge disposition is likely back to [facility].  Follow up w/in 1 week or as needed.    Orders:  No new orders.     Electronically signed by:  KEVIN Brian CNP DNP

## 2021-11-16 NOTE — LETTER
11/16/2021        RE: Johanna Washburn  [address] [place] [address]        [facility] [place] [facility]  Episodic/Acute/Follow-Up  [place] MRN: [id number]. Place of Service where encounter took place:  [facility] (U/SNF) [4000]   Chief Complaint   Patient presents with     RECHECK    HPI: Johanna Washburn  is a 89 year old (4/14/1928), who is being seen today for an episodic care visit.  HPI information obtained from: facility chart records, facility staff, patient report and [facility] chart review. Today's concern is:    Sangita seen today on routine follow-up as she continues to rehab in TCU.  She is having a good day, states that sometimes her hip gives her some pain.  She states when resting she feels really good.  She says her appetite is good, she denies headache, dizziness, shortness of breath, fever, bowel or bladder problems, or new edema.    Past Medical and Surgical History reviewed in Epic today.  MEDICATIONS:  Current Outpatient Medications   Medication Sig Dispense Refill     acetaminophen (TYLENOL) 500 MG tablet Take 2 tablets (1,000 mg) by mouth 3 times daily       [START ON 11/21/2021] aspirin (ASA) 81 MG chewable tablet Take 1 tablet (81 mg) by mouth daily 30 tablet 0     enoxaparin ANTICOAGULANT (LOVENOX) 40 MG/0.4ML syringe Inject 0.4 mLs (40 mg) Subcutaneous every 24 hours for 25 days 10 mL 0     ergocalciferol (VITAMIN D2) 50,000 unit capsule Take 50,000 Units by mouth once a week Thursdays       escitalopram (LEXAPRO) 20 MG tablet Take 10 mg by mouth daily       levothyroxine (SYNTHROID, LEVOTHROID) 75 MCG tablet [LEVOTHYROXINE (SYNTHROID, LEVOTHROID) 75 MCG TABLET] Take 75 mcg by mouth daily.       lidocaine (LIDODERM) 5 % patch Place 1 patch onto the skin daily as needed for moderate pain (rib pain) To prevent lidocaine toxicity, patient should be patch free for 12 hrs daily.       losartan (COZAAR) 100 MG tablet Take 100 mg by mouth daily        "multivitamin, therapeutic (THERA-VIT) TABS tablet Take 1 tablet by mouth daily       NIFEdipine (PROCARDIA XL) 90 MG 24 hr tablet [NIFEDIPINE (PROCARDIA XL) 90 MG 24 HR TABLET] Take 90 mg by mouth daily.       omeprazole (PRILOSEC) 20 MG capsule Take 20 mg by mouth daily       peg 400-propylene glycol (SYSTANE) 0.4-0.3 % Drop [-PROPYLENE GLYCOL (SYSTANE) 0.4-0.3 % DROP] Administer 1 drop to both eyes 4 (four) times a day.       polyethylene glycol (MIRALAX) 17 GM/Dose powder Take 17 g by mouth daily as needed for constipation 510 g 0     rosuvastatin (CRESTOR) 10 MG tablet [ROSUVASTATIN (CRESTOR) 10 MG TABLET] Take 10 mg by mouth at bedtime.       senna-docusate (PERICOLACE) 8.6-50 mg tablet [SENNA-DOCUSATE (PERICOLACE) 8.6-50 MG TABLET] Take 1 tablet by mouth 2 (two) times a day. Do not administer if loose stools.  0     triamcinolone (KENALOG) 0.1 % cream Apply 1 Application topically 3 times daily as needed (rash/itching/dermatitis)        REVIEW OF SYSTEMS: Limited secondary to cognitive impairment but today pt reports the above and 6 point ROS including Respiratory, CV, GI and , other than that noted in the HPI,  is negative.    Objective: /78   Pulse 76   Temp 98.2  F (36.8  C)   Resp 18   Ht 1.676 m (5' 6\")   Wt 53.8 kg (118 lb 11.2 oz)   SpO2 95%   BMI 19.16 kg/m    Exam:  GENERAL APPEARANCE: Alert, in no distress, cooperative.   ENT: Mouth/posterior oropharynx intact w/ moist mucous membranes, hearing acuity [place].  EYES: EOM, conjunctivae, lids, pupils and irises normal, PERRL2.   RESP: Respiratory effort unlabored, no respiratory distress, Lung sounds clear. On RA.   CV: Auscultation of heart reveals S1, S2, rate and rhythm regular, no murmur, no rub or gallop, Edema 0+ BLE. Peripheral pulses are 2+.  ABDOMEN: Normal bowel sounds, soft, non-tender abdomen, and no masses palpated.  SKIN: Inspection/Palpation of skin and subcutaneous tissue baseline w/ fragility. No wounds/rashes noted. " Incision to right hip is covered.  MSK: Immobilizer in place appropriately, and right arm us much more secured.  NEURO: CN II-XII at patient's baseline, sensation baseline PPS.  PSYCH: Insight, judgement, and memory are impaired at baseline, affect and mood are happy/engaged.    Labs: Labs done in facility are in EPIC. Please refer to them using EPIC/Care Everywhere.    ASSESSMENT/PLAN:  Closed nondisplaced intertrochanteric fracture of right femur with routine healing, subsequent encounter  Closed fracture of proximal end of right humerus with routine healing, unspecified fracture morphology, subsequent encounter  Recurrent falls  Chronic kidney disease, stage 3a (H)  Dementia without behavioral disturbance, unspecified dementia type (H)  Severe protein-calorie malnutrition (H)  Frail elderly  Polypharmacy  Acute on chronic. Ongoing.    Orthopedic follow-up was appropriate without any issues with healing.  Right arm is still showing same fractures with delayed healing.  Immobilizer is helpful for this.    Although she states her appetite is good, we note some mild weight loss since her surgery, and decreased intake.  She is on dietary supplementation and she has done well with her polypharmacy reduction.    No significant pain to hip, even with palpation, and no significant right arm or right leg swelling.    Therapy and nursing states that Sangita is doing well, but very slowly progressing.  Discharge disposition is likely back to [facility].  Follow up w/in 1 week or as needed.    Orders:  No new orders.     Electronically signed by:  KEVIN Brian CNP DNP        Sincerely,        KEVIN Oconnor CNP

## 2021-11-17 PROBLEM — E22.2 SIADH (SYNDROME OF INAPPROPRIATE ADH PRODUCTION) (H): Status: RESOLVED | Noted: 2021-10-28 | Resolved: 2021-11-17

## 2021-11-22 ENCOUNTER — TRANSITIONAL CARE UNIT VISIT (OUTPATIENT)
Dept: GERIATRICS | Facility: CLINIC | Age: 86
End: 2021-11-22
Payer: MEDICARE

## 2021-11-22 VITALS
WEIGHT: 119.6 LBS | HEART RATE: 63 BPM | OXYGEN SATURATION: 99 % | SYSTOLIC BLOOD PRESSURE: 157 MMHG | TEMPERATURE: 97.7 F | HEIGHT: 66 IN | BODY MASS INDEX: 19.22 KG/M2 | DIASTOLIC BLOOD PRESSURE: 78 MMHG | RESPIRATION RATE: 18 BRPM

## 2021-11-22 DIAGNOSIS — L89.101 PRESSURE INJURY OF BACK, STAGE 1: ICD-10-CM

## 2021-11-22 DIAGNOSIS — S72.144A CLOSED NONDISPLACED INTERTROCHANTERIC FRACTURE OF RIGHT FEMUR, INITIAL ENCOUNTER (H): Primary | ICD-10-CM

## 2021-11-22 DIAGNOSIS — Z79.899 POLYPHARMACY: ICD-10-CM

## 2021-11-22 DIAGNOSIS — E44.1 MILD PROTEIN-CALORIE MALNUTRITION (H): ICD-10-CM

## 2021-11-22 DIAGNOSIS — I47.10 SVT (SUPRAVENTRICULAR TACHYCARDIA) (H): ICD-10-CM

## 2021-11-22 DIAGNOSIS — I10 ESSENTIAL HYPERTENSION: ICD-10-CM

## 2021-11-22 DIAGNOSIS — S42.201A CLOSED FRACTURE OF PROXIMAL END OF RIGHT HUMERUS, UNSPECIFIED FRACTURE MORPHOLOGY, INITIAL ENCOUNTER: ICD-10-CM

## 2021-11-22 DIAGNOSIS — R29.6 RECURRENT FALLS: ICD-10-CM

## 2021-11-22 DIAGNOSIS — F03.90 DEMENTIA WITHOUT BEHAVIORAL DISTURBANCE, UNSPECIFIED DEMENTIA TYPE: ICD-10-CM

## 2021-11-22 PROCEDURE — 99310 SBSQ NF CARE HIGH MDM 45: CPT

## 2021-11-22 ASSESSMENT — MIFFLIN-ST. JEOR: SCORE: 964.25

## 2021-11-22 NOTE — LETTER
11/22/2021        RE: Johanna Washburn  [address] [address] [place] [id number]        [facility] GERIATRIC SERVICES  [facility] Record Number: [id number]  Place of Service where encounter took place: [facility] [place] (U/SNF) [4000]  Chief Complaint   Patient presents with     RECHECK     HPI:    Johanna Washburn is a 89 year old (4/14/1928), who is being seen today for an episodic care visit. HPI information obtained from: facility chart records, facility staff, patient report and [facility] chart review. Today's concern is:    Sangita is seen today for follow-up on rehab in U. She is having a good day but is eagerto return to her home. She denies pain currently and states that she does not want her medicated lidocaine patch.  She reports fair appetite, denies headache, dizziness, shortness of breath, fever, bowel or bladder problems, or edema.  Nursing report a small pressure area on her mid back at a bony protrusion which they are dressing daily with mepilex.  Sangita reports pain only when the dressing is being removed.    Past Medical and Surgical History reviewed in Epic today.    MEDICATIONS:  Current Outpatient Medications   Medication Sig Dispense Refill     acetaminophen (TYLENOL) 500 MG tablet Take 2 tablets (1,000 mg) by mouth 3 times daily       aspirin (ASA) 81 MG chewable tablet Take 1 tablet (81 mg) by mouth daily 30 tablet 0     ergocalciferol (VITAMIN D2) 50,000 unit capsule Take 50,000 Units by mouth once a week Thursdays       escitalopram (LEXAPRO) 20 MG tablet Take 10 mg by mouth daily       levothyroxine (SYNTHROID, LEVOTHROID) 75 MCG tablet [LEVOTHYROXINE (SYNTHROID, LEVOTHROID) 75 MCG TABLET] Take 75 mcg by mouth daily.       lidocaine (LIDODERM) 5 % patch Place 1 patch onto the skin daily as needed for moderate pain (rib pain) To prevent lidocaine toxicity, patient should be patch free for 12 hrs daily.       losartan (COZAAR) 100 MG tablet Take 100 mg by  "mouth daily       multivitamin, therapeutic (THERA-VIT) TABS tablet Take 1 tablet by mouth daily       NIFEdipine (PROCARDIA XL) 90 MG 24 hr tablet [NIFEDIPINE (PROCARDIA XL) 90 MG 24 HR TABLET] Take 90 mg by mouth daily.       omeprazole (PRILOSEC) 20 MG capsule Take 20 mg by mouth daily       peg 400-propylene glycol (SYSTANE) 0.4-0.3 % Drop [-PROPYLENE GLYCOL (SYSTANE) 0.4-0.3 % DROP] Administer 1 drop to both eyes 4 (four) times a day.       polyethylene glycol (MIRALAX) 17 GM/Dose powder Take 17 g by mouth daily as needed for constipation 510 g 0     rosuvastatin (CRESTOR) 10 MG tablet [ROSUVASTATIN (CRESTOR) 10 MG TABLET] Take 10 mg by mouth at bedtime.       senna-docusate (PERICOLACE) 8.6-50 mg tablet [SENNA-DOCUSATE (PERICOLACE) 8.6-50 MG TABLET] Take 1 tablet by mouth 2 (two) times a day. Do not administer if loose stools.  0     triamcinolone (KENALOG) 0.1 % cream Apply 1 Application topically 3 times daily as needed (rash/itching/dermatitis)        REVIEW OF SYSTEMS:  Limited secondary to cognitive impairment but today pt reports the above and a six-point ROS including respiratory, CV, GI and , other than that noted in the HPI is negative.    Objective:  /57  Pulse 63   Temp 97.7  F (36.5  C)   Resp 18   Ht 1.676 m (5' 6\")   Wt 54.3 kg (119 lb 9.6 oz)   SpO2 99%   BMI 19.30 kg/m    Exam:  GENERAL APPEARANCE:  Alert, in no distress, cooperative  ENT:  Mouth and posterior oropharynx normal, moist mucous membranes, [place]  RESP:  respiratory effort and palpation of chest normal, lungs clear to auscultation , no respiratory distress  CV:  regular rate and rhythm, no murmur, rub, or gallop, no edema, +2 pedal pulses, S1, S2 on auscultation  ABDOMEN:  normal bowel sounds, soft, nontender, no hepatosplenomegaly or other masses  M/S:   immobilizer in place on RUE, RLE mildly tender with lateral abduction  SKIN:  wound healing well, no signs of infection small approx 1x0.5 cm area of pink " non-blanchable tissue on lumbar vertebrae, closed  NEURO:   Cranial nerves 2-12 are normal tested and grossly at patient's baseline, Examination of sensation by touch is intact  PSYCH:  affect and mood normal, insight, judgement and memory impaired at baseline    Labs:   Labs done in SNF are in [place] EPIC. Please refer to them using EPIC/Care Everywhere.    ASSESSMENT/PLAN:     Closed nondisplaced intertrochanteric fracture of right femur, initial encounter (H)  Closed fracture of proximal end of right humerus, unspecified fracture morphology, initial encounter  Polypharmacy  Recurrent falls  Dementia without behavioral disturbance, unspecified dementia type (H)  Mild protein-calorie malnutrition (H)  Essential hypertension  Pressure injury of back, stage 1   Acute on chronic. Ongoing    Orthopedic follow-up for femur showed no issues with healing, WBAT. Coordinated care with CHRISTINA Rosario for guidance on delayed healing of right arm with recommendations to repeat imaging at 6 week eulalio (11/30) before advancing with weightbearing.     Pain is well controlled, no recent use of oxycodone and declining lidocaine patch frequently.    Post-operative Lovenox x 30 days has been completed and PTA daily ASA 81 mg resumed.     Appetite is fair, she has had a mild weight loss with good response to dietary supplementation and reduced polypharmacy (122>116>119)     Very slowly progressing with therapy; planning to discharge back to [facility] with potential need for memory care.     SBP labile 110s-160s on nifedipine and Losartan, goal <160 reasonable given her frailty and falls. No edema since discontinuing torsemide.    Protective dressing to lower back, will add skin-prep to protect skin integrity and decrease dressing changes to every other day.    Provider coordinated care with PT/OT on WB orders and ortho follow up.     Coordinated care with nursing on dressing changes and BP monitoring.     Coordinated care with   for discharge planning and potential need for memory care at discharge.    Orders  1. Discontinue oxycodone.  2. Discontinue lidocaine.  3. Decrease dressing change to every other day, apply skin prep after cleansing.  4. X-ray Right shoulder AP lateral grashey views on 11/30      Total time spent with patient visit at the [facility] was 35 min including patient visit and review of past records. Greater than 50% of total time spent with counseling and coordinating care with nursing, , rehab team and orthopedic PA as noted above.    Electronically signed by:  KEVIN Crump CNP           Sincerely,        KEVIN Crump CNP

## 2021-11-22 NOTE — PROGRESS NOTES
[facility]  [facility] Medical Record Number: [id number]  Place of Service where encounter took place: [facility] (U/SNF) [4000]  Chief Complaint   Patient presents with     RECHECK     HPI:    Johanna Washburn is a 89 year old (4/14/1928), who is being seen today for an episodic care visit. HPI information obtained from: facility chart records, facility staff, patient report and [facility] chart review. Today's concern is:    Sangita is seen today for follow-up on rehab in TCU. She is having a good day but is eagerto return to her home. She denies pain currently and states that she does not want her medicated lidocaine patch.  She reports fair appetite, denies headache, dizziness, shortness of breath, fever, bowel or bladder problems, or edema.  Nursing report a small pressure area on her mid back at a bony protrusion which they are dressing daily with mepilex.  Sangita reports pain only when the dressing is being removed.    Past Medical and Surgical History reviewed in Epic today.    MEDICATIONS:  Current Outpatient Medications   Medication Sig Dispense Refill     acetaminophen (TYLENOL) 500 MG tablet Take 2 tablets (1,000 mg) by mouth 3 times daily       aspirin (ASA) 81 MG chewable tablet Take 1 tablet (81 mg) by mouth daily 30 tablet 0     ergocalciferol (VITAMIN D2) 50,000 unit capsule Take 50,000 Units by mouth once a week Thursdays       escitalopram (LEXAPRO) 20 MG tablet Take 10 mg by mouth daily       levothyroxine (SYNTHROID, LEVOTHROID) 75 MCG tablet [LEVOTHYROXINE (SYNTHROID, LEVOTHROID) 75 MCG TABLET] Take 75 mcg by mouth daily.       lidocaine (LIDODERM) 5 % patch Place 1 patch onto the skin daily as needed for moderate pain (rib pain) To prevent lidocaine toxicity, patient should be patch free for 12 hrs daily.       losartan (COZAAR) 100 MG tablet Take 100 mg by mouth daily       multivitamin, therapeutic (THERA-VIT) TABS tablet Take 1 tablet by  "mouth daily       NIFEdipine (PROCARDIA XL) 90 MG 24 hr tablet [NIFEDIPINE (PROCARDIA XL) 90 MG 24 HR TABLET] Take 90 mg by mouth daily.       omeprazole (PRILOSEC) 20 MG capsule Take 20 mg by mouth daily       peg 400-propylene glycol (SYSTANE) 0.4-0.3 % Drop [-PROPYLENE GLYCOL (SYSTANE) 0.4-0.3 % DROP] Administer 1 drop to both eyes 4 (four) times a day.       polyethylene glycol (MIRALAX) 17 GM/Dose powder Take 17 g by mouth daily as needed for constipation 510 g 0     rosuvastatin (CRESTOR) 10 MG tablet [ROSUVASTATIN (CRESTOR) 10 MG TABLET] Take 10 mg by mouth at bedtime.       senna-docusate (PERICOLACE) 8.6-50 mg tablet [SENNA-DOCUSATE (PERICOLACE) 8.6-50 MG TABLET] Take 1 tablet by mouth 2 (two) times a day. Do not administer if loose stools.  0     triamcinolone (KENALOG) 0.1 % cream Apply 1 Application topically 3 times daily as needed (rash/itching/dermatitis)        REVIEW OF SYSTEMS:  Limited secondary to cognitive impairment but today pt reports the above and a six-point ROS including respiratory, CV, GI and , other than that noted in the HPI is negative.    Objective:  /57  Pulse 63   Temp 97.7  F (36.5  C)   Resp 18   Ht 1.676 m (5' 6\")   Wt 54.3 kg (119 lb 9.6 oz)   SpO2 99%   BMI 19.30 kg/m    Exam:  GENERAL APPEARANCE:  Alert, in no distress, cooperative  ENT:  Mouth and posterior oropharynx normal, moist mucous membranes, [place]  RESP:  respiratory effort and palpation of chest normal, lungs clear to auscultation , no respiratory distress  CV:  regular rate and rhythm, no murmur, rub, or gallop, no edema, +2 pedal pulses, S1, S2 on auscultation  ABDOMEN:  normal bowel sounds, soft, nontender, no hepatosplenomegaly or other masses  M/S:   immobilizer in place on RUE, RLE mildly tender with lateral abduction  SKIN:  wound healing well, no signs of infection small approx 1x0.5 cm area of pink non-blanchable tissue on lumbar vertebrae, closed  NEURO:   Cranial nerves 2-12 are normal " tested and grossly at patient's baseline, Examination of sensation by touch is intact  PSYCH:  affect and mood normal, insight, judgement and memory impaired at baseline    Labs:   Labs done in SNF are in [place] EPIC. Please refer to them using EPIC/Care Everywhere.    ASSESSMENT/PLAN:     Closed nondisplaced intertrochanteric fracture of right femur, initial encounter (H)  Closed fracture of proximal end of right humerus, unspecified fracture morphology, initial encounter  Polypharmacy  Recurrent falls  Dementia without behavioral disturbance, unspecified dementia type (H)  Mild protein-calorie malnutrition (H)  Essential hypertension  Pressure injury of back, stage 1   Acute on chronic. Ongoing    Orthopedic follow-up for femur showed no issues with healing, WBAT. Coordinated care with CHRISTINA Rosario for guidance on delayed healing of right arm with recommendations to repeat imaging at 6 week eulalio (11/30) before advancing with weightbearing.     Pain is well controlled, no recent use of oxycodone and declining lidocaine patch frequently.    Post-operative Lovenox x 30 days has been completed and PTA daily ASA 81 mg resumed.     Appetite is fair, she has had a mild weight loss with good response to dietary supplementation and reduced polypharmacy (122>116>119)     Very slowly progressing with therapy; planning to discharge back to [facility] with potential need for memory care.     SBP labile 110s-160s on nifedipine and Losartan, goal <160 reasonable given her frailty and falls. No edema since discontinuing torsemide.    Protective dressing to lower back, will add skin-prep to protect skin integrity and decrease dressing changes to every other day.    Provider coordinated care with PT/OT on WB orders and ortho follow up.     Coordinated care with nursing on dressing changes and BP monitoring.     Coordinated care with  for discharge planning and potential need for memory care at discharge.    Orders  1.  Discontinue oxycodone.  2. Discontinue lidocaine.  3. Decrease dressing change to every other day, apply skin prep after cleansing.  4. X-ray Right shoulder AP lateral grashey views on 11/30      Total time spent with patient visit at the [facility] nursing [facility] was 35 min including patient visit and review of past records. Greater than 50% of total time spent with counseling and coordinating care with nursing, , rehab team and orthopedic PA as noted above.    Electronically signed by:  KEVIN Crump CNP

## 2021-11-29 ENCOUNTER — TRANSITIONAL CARE UNIT VISIT (OUTPATIENT)
Dept: GERIATRICS | Facility: CLINIC | Age: 86
End: 2021-11-29
Payer: MEDICARE

## 2021-11-29 VITALS
TEMPERATURE: 97.4 F | WEIGHT: 117.6 LBS | DIASTOLIC BLOOD PRESSURE: 71 MMHG | HEIGHT: 66 IN | OXYGEN SATURATION: 94 % | RESPIRATION RATE: 16 BRPM | SYSTOLIC BLOOD PRESSURE: 119 MMHG | HEART RATE: 90 BPM | BODY MASS INDEX: 18.9 KG/M2

## 2021-11-29 DIAGNOSIS — I47.10 SVT (SUPRAVENTRICULAR TACHYCARDIA) (H): ICD-10-CM

## 2021-11-29 DIAGNOSIS — R54 FRAIL ELDERLY: ICD-10-CM

## 2021-11-29 DIAGNOSIS — S42.201A CLOSED FRACTURE OF PROXIMAL END OF RIGHT HUMERUS, UNSPECIFIED FRACTURE MORPHOLOGY, INITIAL ENCOUNTER: ICD-10-CM

## 2021-11-29 DIAGNOSIS — Z79.899 POLYPHARMACY: ICD-10-CM

## 2021-11-29 DIAGNOSIS — E44.1 MILD PROTEIN-CALORIE MALNUTRITION (H): ICD-10-CM

## 2021-11-29 DIAGNOSIS — S72.144A CLOSED NONDISPLACED INTERTROCHANTERIC FRACTURE OF RIGHT FEMUR, INITIAL ENCOUNTER (H): Primary | ICD-10-CM

## 2021-11-29 DIAGNOSIS — E43 SEVERE PROTEIN-CALORIE MALNUTRITION (H): ICD-10-CM

## 2021-11-29 DIAGNOSIS — F03.90 DEMENTIA WITHOUT BEHAVIORAL DISTURBANCE, UNSPECIFIED DEMENTIA TYPE: ICD-10-CM

## 2021-11-29 PROCEDURE — 99309 SBSQ NF CARE MODERATE MDM 30: CPT | Performed by: NURSE PRACTITIONER

## 2021-11-29 RX ORDER — AMOXICILLIN 250 MG
2 CAPSULE ORAL 2 TIMES DAILY
Start: 2021-11-29

## 2021-11-29 ASSESSMENT — MIFFLIN-ST. JEOR: SCORE: 955.18

## 2021-11-29 NOTE — LETTER
11/29/2021        RE: Johanna Washburn  [address] [address] [facility] [address]        [facility] GERIATRIC SERVICE  Episodic/Acute/Follow-Up  [place] MRN: [id number]. Place of Service where encounter took place:  [facility] (U/SNF) [4000]   Chief Complaint   Patient presents with     RECHECK    HPI: Johanna Washburn  is a 89 year old (4/14/1928), who is being seen today for an episodic care visit.  HPI information obtained from: facility chart records, facility staff, patient report and [facility] chart review. Today's concern is:    Sangita seen today on routine follow-up as she continues to rehab in [facility].  Today, Sangita states that she does not have any pain, but did state her arm was tender when provider attempted range of motion.  She denies any headaches, states that sometimes she gets a little dizzy.  She denies shortness of breath, cough, fever, nausea.  She states that she has a fair appetite, and she sleeps very well.    Past Medical and Surgical History reviewed in Epic today.  MEDICATIONS:  Current Outpatient Medications   Medication Sig Dispense Refill     acetaminophen (TYLENOL) 500 MG tablet Take 2 tablets (1,000 mg) by mouth 3 times daily       aspirin (ASA) 81 MG chewable tablet Take 1 tablet (81 mg) by mouth daily 30 tablet 0     ergocalciferol (VITAMIN D2) 50,000 unit capsule Take 50,000 Units by mouth once a week Thursdays       escitalopram (LEXAPRO) 20 MG tablet Take 10 mg by mouth daily       levothyroxine (SYNTHROID, LEVOTHROID) 75 MCG tablet [LEVOTHYROXINE (SYNTHROID, LEVOTHROID) 75 MCG TABLET] Take 75 mcg by mouth daily.       losartan (COZAAR) 100 MG tablet Take 100 mg by mouth daily       multivitamin, therapeutic (THERA-VIT) TABS tablet Take 1 tablet by mouth daily       NIFEdipine (PROCARDIA XL) 90 MG 24 hr tablet [NIFEDIPINE (PROCARDIA XL) 90 MG 24 HR TABLET] Take 90 mg by mouth daily.       omeprazole (PRILOSEC) 20 MG capsule Take 20 mg by mouth daily   "     peg 400-propylene glycol (SYSTANE) 0.4-0.3 % Drop [-PROPYLENE GLYCOL (SYSTANE) 0.4-0.3 % DROP] Administer 1 drop to both eyes 4 (four) times a day.       polyethylene glycol (MIRALAX) 17 GM/Dose powder Take 17 g by mouth daily as needed for constipation 510 g 0     rosuvastatin (CRESTOR) 10 MG tablet [ROSUVASTATIN (CRESTOR) 10 MG TABLET] Take 10 mg by mouth at bedtime.       senna-docusate (PERICOLACE) 8.6-50 mg tablet [SENNA-DOCUSATE (PERICOLACE) 8.6-50 MG TABLET] Take 1 tablet by mouth 2 (two) times a day. Do not administer if loose stools. (Patient taking differently: Take 2 tablets by mouth 2 times daily )  0     triamcinolone (KENALOG) 0.1 % cream Apply 1 Application topically 3 times daily as needed (rash/itching/dermatitis)        REVIEW OF SYSTEMS: Limited secondary to cognitive impairment but today pt reports the above and 6 point ROS including Respiratory, CV, GI and , other than that noted in the HPI,  is negative.    Objective: /71   Pulse 90   Temp 97.4  F (36.3  C)   Resp 16   Ht 1.676 m (5' 6\")   Wt 53.3 kg (117 lb 9.6 oz)   SpO2 94%   BMI 18.98 kg/m    Exam:  GENERAL APPEARANCE: Alert, in no distress, cooperative.   ENT: Mouth/posterior oropharynx intact w/ moist mucous membranes, hearing acuity [place].  EYES: EOM, conjunctivae, lids, pupils and irises normal, PERRL2.   RESP: Respiratory effort unlabored, no respiratory distress, Lung sounds clear. On RA.   CV: Auscultation of heart reveals S1, S2, rate and rhythm regular, no murmur, no rub or gallop, Edema 0+ BLE. Peripheral pulses are 2+.  ABDOMEN: Normal bowel sounds, soft, non-tender abdomen, and no masses palpated.  SKIN: Inspection/Palpation of skin and subcutaneous tissue baseline w/ fragility. No wounds/rashes noted. Incision to right hip is covered.  MSK: Immobilizer in place appropriately, and right arm us much more secured.  NEURO: CN II-XII at patient's baseline, sensation baseline PPS.  PSYCH: Insight, judgement, " and memory are impaired at baseline, affect and mood are happy/engaged.    Labs: Labs done in facility are in EPIC. Please refer to them using [email or web address] Everywhere.    ASSESSMENT/PLAN:  Closed nondisplaced intertrochanteric fracture of right femur with routine healing, subsequent encounter  Closed fracture of proximal end of right humerus with routine healing, unspecified fracture morphology, subsequent encounter  Dementia without behavioral disturbance, unspecified dementia type (H)  Severe protein-calorie malnutrition (H)  Frail elderly  Polypharmacy  Acute on chronic. Ongoing.    Therapy states that Sangita is not progressing, and is likely plateaued status post injury.  She may no longer be appropriate for memory care detention, but instead long-term care where she can have more help with mobility.     is going to arrange a care conference to discuss discharge/disposition for long-term care.    Last week, oxycodone was discontinued, and Sangita appears to be comfortable without it.    Other polypharmacy has been corrected well.  Sangita has tolerated these changes well.  We will continue plan of care and follow up w/in 1 week or as needed.    Orders:  No new orders.    Electronically signed by:  KEVIN Brian CNP DNP        Sincerely,        KEVIN Oconnor CNP

## 2021-11-29 NOTE — PROGRESS NOTES
[facility]IATRIC SERV[facility]  Episodic/Acute/Follow-Up  [place] MRN: [id number]. Place of Service where encounter took place:  [facility] (U/SNF) [4000]   Chief Complaint   Patient presents with     RECHECK    HPI: Johanna Washburn  is a 89 year old (4/14/1928), who is being seen today for an episodic care visit.  HPI information obtained from: facility chart records, facility staff, patient report and [facility] chart review. Today's concern is:    Sangita seen today on routine follow-up as she continues to rehab in TCU.  Today, Sangita states that she does not have any pain, but did state her arm was tender when provider attempted range of motion.  She denies any headaches, states that sometimes she gets a little dizzy.  She denies shortness of breath, cough, fever, nausea.  She states that she has a fair appetite, and she sleeps very well.    Past Medical and Surgical History reviewed in Epic today.  MEDICATIONS:  Current Outpatient Medications   Medication Sig Dispense Refill     acetaminophen (TYLENOL) 500 MG tablet Take 2 tablets (1,000 mg) by mouth 3 times daily       aspirin (ASA) 81 MG chewable tablet Take 1 tablet (81 mg) by mouth daily 30 tablet 0     ergocalciferol (VITAMIN D2) 50,000 unit capsule Take 50,000 Units by mouth once a week Thursdays       escitalopram (LEXAPRO) 20 MG tablet Take 10 mg by mouth daily       levothyroxine (SYNTHROID, LEVOTHROID) 75 MCG tablet [LEVOTHYROXINE (SYNTHROID, LEVOTHROID) 75 MCG TABLET] Take 75 mcg by mouth daily.       losartan (COZAAR) 100 MG tablet Take 100 mg by mouth daily       multivitamin, therapeutic (THERA-VIT) TABS tablet Take 1 tablet by mouth daily       NIFEdipine (PROCARDIA XL) 90 MG 24 hr tablet [NIFEDIPINE (PROCARDIA XL) 90 MG 24 HR TABLET] Take 90 mg by mouth daily.       omeprazole (PRILOSEC) 20 MG capsule Take 20 mg by mouth daily       peg 400-propylene glycol (SYSTANE) 0.4-0.3 % Drop [-PROPYLENE GLYCOL  "(SYSTANE) 0.4-0.3 % DROP] Administer 1 drop to both eyes 4 (four) times a day.       polyethylene glycol (MIRALAX) 17 GM/Dose powder Take 17 g by mouth daily as needed for constipation 510 g 0     rosuvastatin (CRESTOR) 10 MG tablet [ROSUVASTATIN (CRESTOR) 10 MG TABLET] Take 10 mg by mouth at bedtime.       senna-docusate (PERICOLACE) 8.6-50 mg tablet [SENNA-DOCUSATE (PERICOLACE) 8.6-50 MG TABLET] Take 1 tablet by mouth 2 (two) times a day. Do not administer if loose stools. (Patient taking differently: Take 2 tablets by mouth 2 times daily )  0     triamcinolone (KENALOG) 0.1 % cream Apply 1 Application topically 3 times daily as needed (rash/itching/dermatitis)        REVIEW OF SYSTEMS: Limited secondary to cognitive impairment but today pt reports the above and 6 point ROS including Respiratory, CV, GI and , other than that noted in the HPI,  is negative.    Objective: /71   Pulse 90   Temp 97.4  F (36.3  C)   Resp 16   Ht 1.676 m (5' 6\")   Wt 53.3 kg (117 lb 9.6 oz)   SpO2 94%   BMI 18.98 kg/m    Exam:  GENERAL APPEARANCE: Alert, in no distress, cooperative.   ENT: Mouth/posterior oropharynx intact w/ moist mucous membranes, hearing acuity [place].  EYES: EOM, conjunctivae, lids, pupils and irises normal, PERRL2.   RESP: Respiratory effort unlabored, no respiratory distress, Lung sounds clear. On RA.   CV: Auscultation of heart reveals S1, S2, rate and rhythm regular, no murmur, no rub or gallop, Edema 0+ BLE. Peripheral pulses are 2+.  ABDOMEN: Normal bowel sounds, soft, non-tender abdomen, and no masses palpated.  SKIN: Inspection/Palpation of skin and subcutaneous tissue baseline w/ fragility. No wounds/rashes noted. Incision to right hip is covered.  MSK: Immobilizer in place appropriately, and right arm us much more secured.  NEURO: CN II-XII at patient's baseline, sensation baseline PPS.  PSYCH: Insight, judgement, and memory are impaired at baseline, affect and mood are happy/engaged.    Labs: " Labs done in facility are in EPIC. Please refer to them using EPIC/Care Everywhere.    ASSESSMENT/PLAN:  Closed nondisplaced intertrochanteric fracture of right femur with routine healing, subsequent encounter  Closed fracture of proximal end of right humerus with routine healing, unspecified fracture morphology, subsequent encounter  Dementia without behavioral disturbance, unspecified dementia type (H)  Severe protein-calorie malnutrition (H)  Frail elderly  Polypharmacy  Acute on chronic. Ongoing.    Therapy states that Sangita is not progressing, and is likely plateaued status post injury.  She may no longer be appropriate for memory care [facility], but instead long-term care where she can have more help with mobility.     is going to arrange a care conference to discuss discharge/disposition for long-term care.    Last week, oxycodone was discontinued, and Sangita appears to be comfortable without it.    Other polypharmacy has been corrected well.  Sangita has tolerated these changes well.  We will continue plan of care and follow up w/in 1 week or as needed.    Orders:  No new orders.    Electronically signed by:  KEVIN Brian CNP DNP

## 2021-11-30 ENCOUNTER — TELEPHONE (OUTPATIENT)
Dept: GERIATRICS | Facility: CLINIC | Age: 86
End: 2021-11-30
Payer: MEDICARE

## 2021-11-30 NOTE — TELEPHONE ENCOUNTER
Ortho Nursing home visit    Johanna Washburn is a 89 year old female who resides at [facility]    Patient has x-rays reviewed for proximal humerus fracture, treated closed in sling, also had hip fracture with ORIF using IM nail  . todays images are reviewed for healing,       No past medical history on file.   Past Surgical History:   Procedure Laterality Date     APPENDECTOMY       CATARACT EXTRACTION       HIP PINNING Right 10/20/2021    Procedure: INTERNAL FIXATION, FRACTURE, TROCHANTERIC, HIP, USING IM NAIL, USING HANA TABLE;  Surgeon: Greg Reed MD;  Location: [facility] [place] OR     HYSTERECTOMY       MASTECTOMY Bilateral      THYROIDECTOMY          Allergies   Allergen Reactions     Rofecoxib Swelling     Sulfa (Sulfonamide Antibiotics) [Sulfa Drugs] Rash     Augmentin Nausea and GI Disturbance     Demerol Nausea     Demerol [Meperidine] Nausea     Minocycline Dizziness     Sulfamethoxazole-Trimethoprim Rash      There were no vitals taken for this visit.         X-rays show : healing proximal humerus fracture, surgical neck in excellent position,     ASSESSMENT / PLAN:  Advance PROM to AROM right UE, WBAT with walker,   No future x-rays indicated unless change in condition;        [id number]      J.Mandeep OPA-C  [phone] Cell

## 2021-12-05 NOTE — PROGRESS NOTES
[facility] TCU DISCHARGE SUMMARY  PATIENT'S NAME: Johanna Washburn : 1928 MRN: [id number] Place of Service where encounter took place:  [facility] (TCU/SNF) [4000] PRIMARY CARE PROVIDER AND CLINIC RESPONSIBLE AFTER TRANSFER: Reed Shore MD, [facility] [address] / [facility] [place] [address]. Non-FMG Provider     Transferring providers: Dr. Kelley Cook, APRN CNP DNP   Recent Hospitalization/ED: [facility] stay 10/19 to 10/28.  Date of TCU Admission: 10/28/21.  Date of TCU (anticipated) Discharge: 21.  Discharged to: previous assisted living  Cognitive Scores: BIMS: 2/15, SLUMS: 3/30 and CPT: 4/5.6  Physical Function: Wheelchair dependent.  DME: None.  CODE STATUS/ADVANCE DIRECTIVES DISCUSSION: DNR/DNI.  ALLERGIES: Rofecoxib, Sulfa (sulfonamide antibiotics) [sulfa drugs], Augmentin, Demerol, Demerol [meperidine], Minocycline, and Sulfamethoxazole-trimethoprim    DISCHARGE DIAGNOSIS/NURSING FACILITY COURSE:   Closed nondisplaced intertrochanteric fracture of right femur, initial encounter (H)  Closed fracture of proximal end of right humerus, unspecified fracture morphology, initial encounter  Dementia without behavioral disturbance, unspecified dementia type (H)  Severe protein-calorie malnutrition (H)  Recurrent falls  Polypharmacy  Frail elderly    Sangita presented to [facility] on 10/19 s/p fall and was found to have a right femur fx and right humerus fx. She underwent a right hip ORIF (w/ [place]), and her humerus fx was treated conservatively (sling, PT/OT). She had mild postop blood loss, pSVT, which resolved w/ fluids, IV metoprolol, and 2 units PRBCs. She will follow up w/ ortho in 2 weeks for staple removal. Sangita presented to TCU on 10/28 s/p hospitalization.    She had significant impairments cognitively, which proved to be a challenge for rehabilitation. Polypharmacy was greatly reduced in attempt to clear cognition and this may have contributed to  "her fall. The following medications were STOPPED/DISCONTINUED:    Keflex.    Mirapex.    Voltaren.    Tums.    Guaifenesin.    Zofran.    Nitroglycerin.    Torsemide.    Oxycodone.    Lidocaine.    Lovenox (stopped after treatment course was completed).     Omeprazole (complting GDR today).   These medications were CHANGED/STARTED:    Lexapro was reduced secondary to max dosing recommendation w/ older adults.     Scheduled ES Tylenol was given for pain management.     MVI w/ minerals for malnutrition.    She had minimal pain, and progressed from a full mechanical lift to 1 or 2 person assist. Her humerus fx healed nicely and he was able to progress to pROM here, and WBAT to RLE with minimal pain. Her Hgb remained stable. Given debility and poor cognition, she will return to [facility] w/ memory care services.    Today, Piedad is excited to return home.  She smiles, and says that she does not have a headache.  She feels pretty good and does not have pain in her arm or her leg.  She denies any nausea, but does have a poor appetite per nursing.  She says her bowels and bladder are \"fine.\"  She appears to be at her new baseline.  Will make some slight adjustments as noted below, and recommend the PCP consider comfort focus moving forward.    Past Medical History:  has no past medical history on file.  Discharge Medications:  Current Outpatient Medications   Medication Sig Dispense Refill     acetaminophen (TYLENOL) 500 MG tablet Take 2 tablets (1,000 mg) by mouth 2 times daily       aspirin (ASA) 81 MG chewable tablet Take 1 tablet (81 mg) by mouth daily 30 tablet 0     ergocalciferol (VITAMIN D2) 50,000 unit capsule Take 50,000 Units by mouth once a week Thursdays       escitalopram (LEXAPRO) 20 MG tablet Take 10 mg by mouth daily       levothyroxine (SYNTHROID, LEVOTHROID) 75 MCG tablet [LEVOTHYROXINE (SYNTHROID, LEVOTHROID) 75 MCG TABLET] Take 75 mcg by mouth daily.       losartan (COZAAR) 100 MG tablet Take 100 mg by mouth " "daily       multivitamin, therapeutic (THERA-VIT) TABS tablet Take 1 tablet by mouth daily       NIFEdipine (PROCARDIA XL) 90 MG 24 hr tablet [NIFEDIPINE (PROCARDIA XL) 90 MG 24 HR TABLET] Take 90 mg by mouth daily.       peg 400-propylene glycol (SYSTANE) 0.4-0.3 % Drop [-PROPYLENE GLYCOL (SYSTANE) 0.4-0.3 % DROP] Administer 1 drop to both eyes 4 (four) times a day.       polyethylene glycol (MIRALAX) 17 GM/Dose powder Take 17 g by mouth daily as needed for constipation 510 g 0     rosuvastatin (CRESTOR) 10 MG tablet [ROSUVASTATIN (CRESTOR) 10 MG TABLET] Take 10 mg by mouth at bedtime.       senna-docusate (SENOKOT-S/PERICOLACE) 8.6-50 MG tablet Take 2 tablets by mouth 2 times daily       triamcinolone (KENALOG) 0.1 % cream Apply 1 Application topically 3 times daily as needed (rash/itching/dermatitis)         ROS: Limited secondary to cognitive impairment but today pt reports the above and 4 point ROS including Respiratory, CV, GI and , other than that noted in the HPI, is negative.    Physical Exam: Vitals: /71   Pulse 90   Temp 98  F (36.7  C)   Resp 16   Ht 1.676 m (5' 6\")   Wt 52.7 kg (116 lb 3.2 oz)   SpO2 95%   BMI 18.76 kg/m    GENERAL APPEARANCE: Alert, in no distress, cooperative.   ENT: Mouth/posterior oropharynx intact w/ moist mucous membranes, hearing acuity [place].  EYES: EOM, conjunctivae, lids, pupils and irises normal, PERRL2.   RESP: Respiratory effort unlabored, no respiratory distress, Lung sounds clear. On RA.   CV: Auscultation of heart reveals S1, S2, rate and rhythm regular, no murmur, no rub or gallop, Edema 0+ BLE. Peripheral pulses are 2+.  ABDOMEN: Normal bowel sounds, soft, non-tender abdomen, and no masses palpated.  SKIN: Inspection/Palpation of skin and subcutaneous tissue baseline w/ fragility. No wounds/rashes noted.   NEURO: CN II-XII at patient's baseline, sensation baseline PPS.  PSYCH: Insight, judgement, and memory are baseline impaired, affect and mood are " happy/engaged.    Facility Labs: Labs done in SNF are in [place] EPIC. Please refer to them using EPIC/Care Everywhere.    DISCHARGE PLAN:    Follow up labs: No labs orders/due    Medical Follow Up:  Follow up with primary care provider in 2 weeks    MTM referral needed and placed by this provider: No    Current [place] scheduled appointments: None.  Discharge Services: Home Care: Occupational Therapy, Physical Therapy, Registered Nurse and Home Health Aide    Orders:  1. Discontinue Omeprazole.  2. Decrease Tylenol to BID. Dx: pain/OA.     TOTAL DISCHARGE TIME:   Greater than 30 minutes    Electronically signed by:  Dr. Kelley Cook, APRN CNP DNP

## 2021-12-06 ENCOUNTER — DISCHARGE SUMMARY NURSING HOME (OUTPATIENT)
Dept: GERIATRICS | Facility: CLINIC | Age: 86
End: 2021-12-06
Payer: MEDICARE

## 2021-12-06 VITALS
OXYGEN SATURATION: 95 % | TEMPERATURE: 98 F | RESPIRATION RATE: 16 BRPM | HEART RATE: 90 BPM | BODY MASS INDEX: 18.68 KG/M2 | WEIGHT: 116.2 LBS | SYSTOLIC BLOOD PRESSURE: 119 MMHG | HEIGHT: 66 IN | DIASTOLIC BLOOD PRESSURE: 71 MMHG

## 2021-12-06 DIAGNOSIS — S42.201A CLOSED FRACTURE OF PROXIMAL END OF RIGHT HUMERUS, UNSPECIFIED FRACTURE MORPHOLOGY, INITIAL ENCOUNTER: ICD-10-CM

## 2021-12-06 DIAGNOSIS — F03.90 DEMENTIA WITHOUT BEHAVIORAL DISTURBANCE, UNSPECIFIED DEMENTIA TYPE: ICD-10-CM

## 2021-12-06 DIAGNOSIS — R29.6 RECURRENT FALLS: ICD-10-CM

## 2021-12-06 DIAGNOSIS — S72.144A CLOSED NONDISPLACED INTERTROCHANTERIC FRACTURE OF RIGHT FEMUR, INITIAL ENCOUNTER (H): Primary | ICD-10-CM

## 2021-12-06 DIAGNOSIS — Z79.899 POLYPHARMACY: ICD-10-CM

## 2021-12-06 DIAGNOSIS — R54 FRAIL ELDERLY: ICD-10-CM

## 2021-12-06 DIAGNOSIS — E43 SEVERE PROTEIN-CALORIE MALNUTRITION (H): ICD-10-CM

## 2021-12-06 PROCEDURE — 99316 NF DSCHRG MGMT 30 MIN+: CPT | Performed by: NURSE PRACTITIONER

## 2021-12-06 RX ORDER — ACETAMINOPHEN 500 MG
1000 TABLET ORAL 2 TIMES DAILY
Start: 2021-12-06

## 2021-12-06 ASSESSMENT — MIFFLIN-ST. JEOR: SCORE: 948.83

## 2021-12-06 NOTE — LETTER
2021        RE: Johanna Washburn  [address] [address] [facility] [address]        [facility] TCU DISCHARGE SUMMARY  PATIENT'S NAME: Johanna Washburn : 1928 MRN: [id number] Place of Service where encounter took place:  [facility] (TCU/SNF) [4000] PRIMARY CARE PROVIDER AND CLINIC RESPONSIBLE AFTER TRANSFER: Reed Shore MD, [facility] [address] / [facility] [address]. Non-FMG Provider     Transferring providers: Dr. Kelley Cook, APRN CNP DNP   Recent Hospitalization/ED: [facility] stay 10/19 to 10/28.  Date of TCU Admission: 10/28/21.  Date of TCU (anticipated) Discharge: 21.  Discharged to: previous assisted living  Cognitive Scores: BIMS: 2/15, SLUMS: 330 and CPT: 4/5.6  Physical Function: Wheelchair dependent.  DME: None.  CODE STATUS/ADVANCE DIRECTIVES DISCUSSION: DNR/DNI.  ALLERGIES: Rofecoxib, Sulfa (sulfonamide antibiotics) [sulfa drugs], Augmentin, Demerol, Demerol [meperidine], Minocycline, and Sulfamethoxazole-trimethoprim    DISCHARGE DIAGNOSIS/NURSING FACILITY COURSE:   Closed nondisplaced intertrochanteric fracture of right femur, initial encounter (H)  Closed fracture of proximal end of right humerus, unspecified fracture morphology, initial encounter  Dementia without behavioral disturbance, unspecified dementia type (H)  Severe protein-calorie malnutrition (H)  Recurrent falls  Polypharmacy  Frail elderly    Sangita presented to [facility] on 10/19 s/p fall and was found to have a right femur fx and right humerus fx. She underwent a right hip ORIF (w/ [place]), and her humerus fx was treated conservatively (sling, PT/OT). She had mild postop blood loss, pSVT, which resolved w/ fluids, IV metoprolol, and 2 units PRBCs. She will follow up w/ ortho in 2 weeks for staple removal. Sangita presented to TCU on 10/28 s/p hospitalization.    She had significant impairments cognitively, which proved to be a challenge for rehabilitation.  "Polypharmacy was greatly reduced in attempt to clear cognition and this may have contributed to her fall. The following medications were STOPPED/DISCONTINUED:    Keflex.    Mirapex.    Voltaren.    Tums.    Guaifenesin.    Zofran.    Nitroglycerin.    Torsemide.    Oxycodone.    Lidocaine.    Lovenox (stopped after treatment course was completed).     Omeprazole (complting GDR today).   These medications were CHANGED/STARTED:    Lexapro was reduced secondary to max dosing recommendation w/ older adults.     Scheduled ES Tylenol was given for pain management.     MVI w/ minerals for malnutrition.    She had minimal pain, and progressed from a full mechanical lift to 1 or 2 person assist. Her humerus fx healed nicely and he was able to progress to pROM here, and WBAT to RLE with minimal pain. Her Hgb remained stable. Given debility and poor cognition, she will return to [facility] w/ memory care services.    Today, Piedad is excited to return home.  She smiles, and says that she does not have a headache.  She feels pretty good and does not have pain in her arm or her leg.  She denies any nausea, but does have a poor appetite per nursing.  She says her bowels and bladder are \"fine.\"  She appears to be at her new baseline.  Will make some slight adjustments as noted below, and recommend the PCP consider comfort focus moving forward.    Past Medical History:  has no past medical history on file.  Discharge Medications:  Current Outpatient Medications   Medication Sig Dispense Refill     acetaminophen (TYLENOL) 500 MG tablet Take 2 tablets (1,000 mg) by mouth 2 times daily       aspirin (ASA) 81 MG chewable tablet Take 1 tablet (81 mg) by mouth daily 30 tablet 0     ergocalciferol (VITAMIN D2) 50,000 unit capsule Take 50,000 Units by mouth once a week Thursdays       escitalopram (LEXAPRO) 20 MG tablet Take 10 mg by mouth daily       levothyroxine (SYNTHROID, LEVOTHROID) 75 MCG tablet [LEVOTHYROXINE (SYNTHROID, LEVOTHROID) 75 MCG " "TABLET] Take 75 mcg by mouth daily.       losartan (COZAAR) 100 MG tablet Take 100 mg by mouth daily       multivitamin, therapeutic (THERA-VIT) TABS tablet Take 1 tablet by mouth daily       NIFEdipine (PROCARDIA XL) 90 MG 24 hr tablet [NIFEDIPINE (PROCARDIA XL) 90 MG 24 HR TABLET] Take 90 mg by mouth daily.       peg 400-propylene glycol (SYSTANE) 0.4-0.3 % Drop [-PROPYLENE GLYCOL (SYSTANE) 0.4-0.3 % DROP] Administer 1 drop to both eyes 4 (four) times a day.       polyethylene glycol (MIRALAX) 17 GM/Dose powder Take 17 g by mouth daily as needed for constipation 510 g 0     rosuvastatin (CRESTOR) 10 MG tablet [ROSUVASTATIN (CRESTOR) 10 MG TABLET] Take 10 mg by mouth at bedtime.       senna-docusate (SENOKOT-S/PERICOLACE) 8.6-50 MG tablet Take 2 tablets by mouth 2 times daily       triamcinolone (KENALOG) 0.1 % cream Apply 1 Application topically 3 times daily as needed (rash/itching/dermatitis)         ROS: Limited secondary to cognitive impairment but today pt reports the above and 4 point ROS including Respiratory, CV, GI and , other than that noted in the HPI, is negative.    Physical Exam: Vitals: /71   Pulse 90   Temp 98  F (36.7  C)   Resp 16   Ht 1.676 m (5' 6\")   Wt 52.7 kg (116 lb 3.2 oz)   SpO2 95%   BMI 18.76 kg/m    GENERAL APPEARANCE: Alert, in no distress, cooperative.   ENT: Mouth/posterior oropharynx intact w/ moist mucous membranes, hearing acuity [place].  EYES: EOM, conjunctivae, lids, pupils and irises normal, PERRL2.   RESP: Respiratory effort unlabored, no respiratory distress, Lung sounds clear. On RA.   CV: Auscultation of heart reveals S1, S2, rate and rhythm regular, no murmur, no rub or gallop, Edema 0+ BLE. Peripheral pulses are 2+.  ABDOMEN: Normal bowel sounds, soft, non-tender abdomen, and no masses palpated.  SKIN: Inspection/Palpation of skin and subcutaneous tissue baseline w/ fragility. No wounds/rashes noted.   NEURO: CN II-XII at patient's baseline, sensation " baseline PPS.  PSYCH: Insight, judgement, and memory are baseline impaired, affect and mood are happy/engaged.    Facility Labs: Labs done in SNF are in [place] EPIC. Please refer to them using EPIC/Care Everywhere.    DISCHARGE PLAN:    Follow up labs: No labs orders/due    Medical Follow Up:  Follow up with primary care provider in 2 weeks    MTM referral needed and placed by this provider: No    Current [place] scheduled appointments: None.  Discharge Services: Home Care: Occupational Therapy, Physical Therapy, Registered Nurse and Home Health Aide    Orders:  1. Discontinue Omeprazole.  2. Decrease Tylenol to BID. Dx: pain/OA.     TOTAL DISCHARGE TIME:   Greater than 30 minutes    Electronically signed by:  Dr. Kelley Cook, KEVIN CNP DNP        Sincerely,        KEVIN Oconnor CNP

## 2022-07-23 ENCOUNTER — HEALTH MAINTENANCE LETTER (OUTPATIENT)
Age: 87
End: 2022-07-23

## 2022-10-01 ENCOUNTER — HEALTH MAINTENANCE LETTER (OUTPATIENT)
Age: 87
End: 2022-10-01

## 2023-08-06 ENCOUNTER — HEALTH MAINTENANCE LETTER (OUTPATIENT)
Age: 88
End: 2023-08-06

## 2024-09-29 ENCOUNTER — HEALTH MAINTENANCE LETTER (OUTPATIENT)
Age: 89
End: 2024-09-29

## (undated) DEVICE — KIT PATIENT CARE HANA TABLE PROFX SUPINE 6855

## (undated) DEVICE — MAT FLOOR SURGICAL 40X38 0702140238

## (undated) DEVICE — CUSTOM PACK GEN MAJOR SBA5BGMHEA

## (undated) DEVICE — PREP CHLORAPREP 26ML TINTED HI-LITE ORANGE 930815

## (undated) DEVICE — WIRE GUIDE 3.2X400MM  357.399

## (undated) DEVICE — SOL NACL 0.9% IRRIG 1000ML BOTTLE 2F7124

## (undated) DEVICE — DRAPE C-ARMOR 5 SIDED 5523

## (undated) DEVICE — DRSG GAUZE 4X4" TRAY 6939

## (undated) DEVICE — ALCOHOL ISOPROPYL 4 OZ 70% IA7004

## (undated) DEVICE — GLOVE BIOGEL PI ORTHOPRO SZ 7.5 47675

## (undated) DEVICE — SUTURE VICRYL+ 2-0 27IN CT-1 UND VCP259H

## (undated) DEVICE — SU ETHILON 3-0 PS-2 18" 1669H

## (undated) DEVICE — GLOVE BIOGEL PI INDICATOR 8.0 LF 41680

## (undated) DEVICE — DRAPE IOBAN ISOLATION VERTICAL 320X21CM 6617

## (undated) DEVICE — PADDING CAST 4IN WEBRIL STRL 2502

## (undated) DEVICE — DRAPE U SPLIT 74X120" 29440

## (undated) DEVICE — DRAPE STERI U 1015

## (undated) DEVICE — CATH TRAY FOLEY 16FR W/METER A800365

## (undated) DEVICE — DRESSING MEPILEX BORDER POST-OP 4X8

## (undated) DEVICE — DRILL BIT QUICK COUPLING 3 FLUTE 4.2MMX330/100MM CALIBRATE

## (undated) DEVICE — SUCTION TIP YANKAUER FLEX ORTHO K62

## (undated) DEVICE — DRAPE C-ARM 60X42" 1013

## (undated) DEVICE — BLADE KNIFE SURG 15 371115

## (undated) DEVICE — SUCTION MANIFOLD NEPTUNE 2 SYS 1 PORT 702-025-000

## (undated) DEVICE — PLATE GROUNDING ADULT W/CORD 9165L

## (undated) DEVICE — Device

## (undated) RX ORDER — VANCOMYCIN HYDROCHLORIDE 1 G/20ML
INJECTION, POWDER, LYOPHILIZED, FOR SOLUTION INTRAVENOUS
Status: DISPENSED
Start: 2021-10-20